# Patient Record
Sex: MALE | Race: BLACK OR AFRICAN AMERICAN | Employment: UNEMPLOYED | URBAN - METROPOLITAN AREA
[De-identification: names, ages, dates, MRNs, and addresses within clinical notes are randomized per-mention and may not be internally consistent; named-entity substitution may affect disease eponyms.]

---

## 2017-09-04 PROBLEM — G40.909 SEIZURE DISORDER (HCC): Status: ACTIVE | Noted: 2017-09-04

## 2017-09-14 ENCOUNTER — OFFICE VISIT (OUTPATIENT)
Dept: FAMILY MEDICINE CLINIC | Age: 22
End: 2017-09-14

## 2017-09-14 VITALS
OXYGEN SATURATION: 98 % | WEIGHT: 200.4 LBS | RESPIRATION RATE: 16 BRPM | HEIGHT: 68 IN | BODY MASS INDEX: 30.37 KG/M2 | HEART RATE: 81 BPM | DIASTOLIC BLOOD PRESSURE: 68 MMHG | SYSTOLIC BLOOD PRESSURE: 114 MMHG

## 2017-09-14 DIAGNOSIS — Z76.89 ENCOUNTER TO ESTABLISH CARE: ICD-10-CM

## 2017-09-14 DIAGNOSIS — Z23 NEED FOR TDAP VACCINATION: ICD-10-CM

## 2017-09-14 DIAGNOSIS — Z00.00 WELLNESS EXAMINATION: Primary | ICD-10-CM

## 2017-09-14 DIAGNOSIS — Z11.4 SCREENING FOR HIV WITHOUT PRESENCE OF RISK FACTORS: ICD-10-CM

## 2017-09-14 PROCEDURE — 99385 PREV VISIT NEW AGE 18-39: CPT | Performed by: REGISTERED NURSE

## 2017-09-14 ASSESSMENT — PATIENT HEALTH QUESTIONNAIRE - PHQ9
SUM OF ALL RESPONSES TO PHQ QUESTIONS 1-9: 0
1. LITTLE INTEREST OR PLEASURE IN DOING THINGS: 0
2. FEELING DOWN, DEPRESSED OR HOPELESS: 0
SUM OF ALL RESPONSES TO PHQ9 QUESTIONS 1 & 2: 0

## 2017-09-27 ENCOUNTER — NURSE ONLY (OUTPATIENT)
Dept: FAMILY MEDICINE CLINIC | Age: 22
End: 2017-09-27

## 2017-09-27 DIAGNOSIS — Z11.4 SCREENING FOR HIV WITHOUT PRESENCE OF RISK FACTORS: ICD-10-CM

## 2017-09-27 DIAGNOSIS — Z00.00 WELLNESS EXAMINATION: ICD-10-CM

## 2017-09-27 LAB
CHOLESTEROL, TOTAL: 205 MG/DL (ref 0–199)
HDLC SERPL-MCNC: 56 MG/DL (ref 40–60)
LDL CHOLESTEROL CALCULATED: 138 MG/DL
TRIGL SERPL-MCNC: 57 MG/DL (ref 0–150)
VLDLC SERPL CALC-MCNC: 11 MG/DL

## 2017-09-27 PROCEDURE — 36415 COLL VENOUS BLD VENIPUNCTURE: CPT | Performed by: REGISTERED NURSE

## 2017-09-28 LAB — HIV-1 AND HIV-2 ANTIBODIES: NORMAL

## 2019-10-11 ENCOUNTER — APPOINTMENT (OUTPATIENT)
Dept: GENERAL RADIOLOGY | Age: 24
End: 2019-10-11
Payer: COMMERCIAL

## 2019-10-11 ENCOUNTER — HOSPITAL ENCOUNTER (EMERGENCY)
Age: 24
Discharge: HOME | End: 2019-10-11
Payer: COMMERCIAL

## 2019-10-11 VITALS
SYSTOLIC BLOOD PRESSURE: 101 MMHG | BODY MASS INDEX: 32.53 KG/M2 | OXYGEN SATURATION: 100 % | RESPIRATION RATE: 18 BRPM | DIASTOLIC BLOOD PRESSURE: 76 MMHG | HEART RATE: 93 BPM | TEMPERATURE: 98.6 F | WEIGHT: 207.67 LBS

## 2019-10-11 DIAGNOSIS — R07.89 CHEST WALL PAIN: Primary | ICD-10-CM

## 2019-10-11 LAB
ANION GAP SERPL CALCULATED.3IONS-SCNC: 14 MMOL/L (ref 3–16)
BASOPHILS ABSOLUTE: 0 K/UL (ref 0–0.2)
BASOPHILS RELATIVE PERCENT: 0.3 %
BUN BLDV-MCNC: 11 MG/DL (ref 7–20)
CALCIUM SERPL-MCNC: 8.9 MG/DL (ref 8.3–10.6)
CHLORIDE BLD-SCNC: 100 MMOL/L (ref 99–110)
CO2: 21 MMOL/L (ref 21–32)
CREAT SERPL-MCNC: 1 MG/DL (ref 0.9–1.3)
EKG ATRIAL RATE: 78 BPM
EKG DIAGNOSIS: NORMAL
EKG P AXIS: 57 DEGREES
EKG P-R INTERVAL: 178 MS
EKG Q-T INTERVAL: 366 MS
EKG QRS DURATION: 92 MS
EKG QTC CALCULATION (BAZETT): 417 MS
EKG R AXIS: 71 DEGREES
EKG T AXIS: 0 DEGREES
EKG VENTRICULAR RATE: 78 BPM
EOSINOPHILS ABSOLUTE: 0 K/UL (ref 0–0.6)
EOSINOPHILS RELATIVE PERCENT: 0.8 %
GFR AFRICAN AMERICAN: >60
GFR NON-AFRICAN AMERICAN: >60
GLUCOSE BLD-MCNC: 101 MG/DL (ref 70–99)
HCT VFR BLD CALC: 45.3 % (ref 40.5–52.5)
HEMOGLOBIN: 15.3 G/DL (ref 13.5–17.5)
LYMPHOCYTES ABSOLUTE: 1.9 K/UL (ref 1–5.1)
LYMPHOCYTES RELATIVE PERCENT: 32.3 %
MCH RBC QN AUTO: 31.2 PG (ref 26–34)
MCHC RBC AUTO-ENTMCNC: 33.7 G/DL (ref 31–36)
MCV RBC AUTO: 92.5 FL (ref 80–100)
MONOCYTES ABSOLUTE: 0.5 K/UL (ref 0–1.3)
MONOCYTES RELATIVE PERCENT: 9.1 %
NEUTROPHILS ABSOLUTE: 3.3 K/UL (ref 1.7–7.7)
NEUTROPHILS RELATIVE PERCENT: 57.5 %
PDW BLD-RTO: 12.9 % (ref 12.4–15.4)
PLATELET # BLD: 263 K/UL (ref 135–450)
PLATELET SLIDE REVIEW: ADEQUATE
PMV BLD AUTO: 8.5 FL (ref 5–10.5)
POTASSIUM SERPL-SCNC: 3.7 MMOL/L (ref 3.5–5.1)
RBC # BLD: 4.89 M/UL (ref 4.2–5.9)
SLIDE REVIEW: NORMAL
SODIUM BLD-SCNC: 135 MMOL/L (ref 136–145)
TROPONIN: <0.01 NG/ML
WBC # BLD: 5.8 K/UL (ref 4–11)

## 2019-10-11 PROCEDURE — 93005 ELECTROCARDIOGRAM TRACING: CPT | Performed by: EMERGENCY MEDICINE

## 2019-10-11 PROCEDURE — 6360000002 HC RX W HCPCS: Performed by: PHYSICIAN ASSISTANT

## 2019-10-11 PROCEDURE — 93010 ELECTROCARDIOGRAM REPORT: CPT | Performed by: INTERNAL MEDICINE

## 2019-10-11 PROCEDURE — 80048 BASIC METABOLIC PNL TOTAL CA: CPT

## 2019-10-11 PROCEDURE — 85025 COMPLETE CBC W/AUTO DIFF WBC: CPT

## 2019-10-11 PROCEDURE — 71046 X-RAY EXAM CHEST 2 VIEWS: CPT

## 2019-10-11 PROCEDURE — 84484 ASSAY OF TROPONIN QUANT: CPT

## 2019-10-11 PROCEDURE — 84132 ASSAY OF SERUM POTASSIUM: CPT

## 2019-10-11 PROCEDURE — 99284 EMERGENCY DEPT VISIT MOD MDM: CPT

## 2019-10-11 PROCEDURE — 96374 THER/PROPH/DIAG INJ IV PUSH: CPT

## 2019-10-11 RX ORDER — NAPROXEN 500 MG/1
500 TABLET ORAL 2 TIMES DAILY PRN
Qty: 20 TABLET | Refills: 0 | Status: SHIPPED | OUTPATIENT
Start: 2019-10-11 | End: 2020-05-08 | Stop reason: ALTCHOICE

## 2019-10-11 RX ORDER — KETOROLAC TROMETHAMINE 30 MG/ML
15 INJECTION, SOLUTION INTRAMUSCULAR; INTRAVENOUS ONCE
Status: COMPLETED | OUTPATIENT
Start: 2019-10-11 | End: 2019-10-11

## 2019-10-11 RX ADMIN — KETOROLAC TROMETHAMINE 15 MG: 30 INJECTION, SOLUTION INTRAMUSCULAR at 11:52

## 2019-10-11 ASSESSMENT — PAIN DESCRIPTION - LOCATION: LOCATION: CHEST

## 2019-10-11 ASSESSMENT — ENCOUNTER SYMPTOMS
SHORTNESS OF BREATH: 0
COUGH: 0
NAUSEA: 0
BACK PAIN: 0
DIARRHEA: 0
ABDOMINAL PAIN: 0
VOMITING: 0
EYE PAIN: 0

## 2019-10-11 ASSESSMENT — PAIN SCALES - GENERAL
PAINLEVEL_OUTOF10: 4
PAINLEVEL_OUTOF10: 4

## 2019-10-11 ASSESSMENT — PAIN DESCRIPTION - DESCRIPTORS: DESCRIPTORS: PRESSURE

## 2019-10-11 ASSESSMENT — PAIN DESCRIPTION - FREQUENCY: FREQUENCY: INTERMITTENT

## 2019-10-11 ASSESSMENT — PAIN DESCRIPTION - PAIN TYPE: TYPE: ACUTE PAIN

## 2019-10-15 ENCOUNTER — OFFICE VISIT (OUTPATIENT)
Dept: FAMILY MEDICINE CLINIC | Age: 24
End: 2019-10-15
Payer: COMMERCIAL

## 2019-10-15 VITALS
DIASTOLIC BLOOD PRESSURE: 86 MMHG | HEIGHT: 67 IN | HEART RATE: 75 BPM | SYSTOLIC BLOOD PRESSURE: 120 MMHG | OXYGEN SATURATION: 98 % | TEMPERATURE: 98.1 F | WEIGHT: 206.8 LBS | RESPIRATION RATE: 15 BRPM | BODY MASS INDEX: 32.46 KG/M2

## 2019-10-15 DIAGNOSIS — Z09 HOSPITAL DISCHARGE FOLLOW-UP: Primary | ICD-10-CM

## 2019-10-15 DIAGNOSIS — R07.89 CHEST WALL PAIN: ICD-10-CM

## 2019-10-15 PROCEDURE — G8417 CALC BMI ABV UP PARAM F/U: HCPCS | Performed by: REGISTERED NURSE

## 2019-10-15 PROCEDURE — G8427 DOCREV CUR MEDS BY ELIG CLIN: HCPCS | Performed by: REGISTERED NURSE

## 2019-10-15 PROCEDURE — 1036F TOBACCO NON-USER: CPT | Performed by: REGISTERED NURSE

## 2019-10-15 PROCEDURE — 99213 OFFICE O/P EST LOW 20 MIN: CPT | Performed by: REGISTERED NURSE

## 2019-10-15 PROCEDURE — G8484 FLU IMMUNIZE NO ADMIN: HCPCS | Performed by: REGISTERED NURSE

## 2019-10-15 RX ORDER — PREDNISONE 10 MG/1
TABLET ORAL
Qty: 42 TABLET | Refills: 0 | Status: SHIPPED | OUTPATIENT
Start: 2019-10-15 | End: 2019-10-25

## 2019-10-15 ASSESSMENT — PATIENT HEALTH QUESTIONNAIRE - PHQ9
SUM OF ALL RESPONSES TO PHQ QUESTIONS 1-9: 0
2. FEELING DOWN, DEPRESSED OR HOPELESS: 0
SUM OF ALL RESPONSES TO PHQ9 QUESTIONS 1 & 2: 0
1. LITTLE INTEREST OR PLEASURE IN DOING THINGS: 0
SUM OF ALL RESPONSES TO PHQ QUESTIONS 1-9: 0

## 2019-10-15 ASSESSMENT — ENCOUNTER SYMPTOMS
WHEEZING: 0
SHORTNESS OF BREATH: 0

## 2019-11-17 ASSESSMENT — ENCOUNTER SYMPTOMS
CHEST TIGHTNESS: 0
COUGH: 0
APNEA: 0
STRIDOR: 0
CHOKING: 0

## 2020-04-07 ENCOUNTER — APPOINTMENT (OUTPATIENT)
Dept: GENERAL RADIOLOGY | Age: 25
End: 2020-04-07
Payer: COMMERCIAL

## 2020-04-07 ENCOUNTER — HOSPITAL ENCOUNTER (EMERGENCY)
Age: 25
Discharge: HOME | End: 2020-04-07
Payer: COMMERCIAL

## 2020-04-07 ENCOUNTER — APPOINTMENT (OUTPATIENT)
Dept: CT IMAGING | Age: 25
End: 2020-04-07
Payer: COMMERCIAL

## 2020-04-07 VITALS
WEIGHT: 206.13 LBS | RESPIRATION RATE: 16 BRPM | BODY MASS INDEX: 32.35 KG/M2 | SYSTOLIC BLOOD PRESSURE: 118 MMHG | TEMPERATURE: 98.1 F | OXYGEN SATURATION: 99 % | HEART RATE: 70 BPM | HEIGHT: 67 IN | DIASTOLIC BLOOD PRESSURE: 78 MMHG

## 2020-04-07 LAB
AMPHETAMINE SCREEN, URINE: NORMAL
ANION GAP SERPL CALCULATED.3IONS-SCNC: 14 MMOL/L (ref 3–16)
BARBITURATE SCREEN URINE: NORMAL
BASOPHILS ABSOLUTE: 0 K/UL (ref 0–0.2)
BASOPHILS RELATIVE PERCENT: 0.3 %
BENZODIAZEPINE SCREEN, URINE: NORMAL
BILIRUBIN URINE: NEGATIVE
BLOOD, URINE: NEGATIVE
BUN BLDV-MCNC: 8 MG/DL (ref 7–20)
CALCIUM SERPL-MCNC: 9.4 MG/DL (ref 8.3–10.6)
CANNABINOID SCREEN URINE: NORMAL
CHLORIDE BLD-SCNC: 102 MMOL/L (ref 99–110)
CLARITY: CLEAR
CO2: 25 MMOL/L (ref 21–32)
COCAINE METABOLITE SCREEN URINE: NORMAL
COLOR: YELLOW
CREAT SERPL-MCNC: 1 MG/DL (ref 0.9–1.3)
EOSINOPHILS ABSOLUTE: 0 K/UL (ref 0–0.6)
EOSINOPHILS RELATIVE PERCENT: 0.2 %
GFR AFRICAN AMERICAN: >60
GFR NON-AFRICAN AMERICAN: >60
GLUCOSE BLD-MCNC: 103 MG/DL (ref 70–99)
GLUCOSE URINE: NEGATIVE MG/DL
HCT VFR BLD CALC: 46.4 % (ref 40.5–52.5)
HEMOGLOBIN: 15.7 G/DL (ref 13.5–17.5)
KEPPRA DOSE AMT: NORMAL
KEPPRA: 7.9 UG/ML (ref 6–46)
KETONES, URINE: NEGATIVE MG/DL
LEUKOCYTE ESTERASE, URINE: NEGATIVE
LYMPHOCYTES ABSOLUTE: 1.4 K/UL (ref 1–5.1)
LYMPHOCYTES RELATIVE PERCENT: 19.6 %
Lab: NORMAL
MCH RBC QN AUTO: 30.9 PG (ref 26–34)
MCHC RBC AUTO-ENTMCNC: 33.8 G/DL (ref 31–36)
MCV RBC AUTO: 91.3 FL (ref 80–100)
METHADONE SCREEN, URINE: NORMAL
MICROSCOPIC EXAMINATION: NORMAL
MONOCYTES ABSOLUTE: 0.6 K/UL (ref 0–1.3)
MONOCYTES RELATIVE PERCENT: 8 %
NEUTROPHILS ABSOLUTE: 5.1 K/UL (ref 1.7–7.7)
NEUTROPHILS RELATIVE PERCENT: 71.9 %
NITRITE, URINE: NEGATIVE
OPIATE SCREEN URINE: NORMAL
OXYCODONE URINE: NORMAL
PDW BLD-RTO: 12.8 % (ref 12.4–15.4)
PH UA: 7
PH UA: 7 (ref 5–8)
PHENCYCLIDINE SCREEN URINE: NORMAL
PLATELET # BLD: 314 K/UL (ref 135–450)
PMV BLD AUTO: 7.5 FL (ref 5–10.5)
POTASSIUM REFLEX MAGNESIUM: 3.8 MMOL/L (ref 3.5–5.1)
PROPOXYPHENE SCREEN: NORMAL
PROTEIN UA: NEGATIVE MG/DL
RBC # BLD: 5.09 M/UL (ref 4.2–5.9)
SODIUM BLD-SCNC: 141 MMOL/L (ref 136–145)
SPECIFIC GRAVITY UA: 1.02 (ref 1–1.03)
URINE REFLEX TO CULTURE: NORMAL
URINE TYPE: NORMAL
UROBILINOGEN, URINE: 1 E.U./DL
WBC # BLD: 7 K/UL (ref 4–11)

## 2020-04-07 PROCEDURE — 71045 X-RAY EXAM CHEST 1 VIEW: CPT

## 2020-04-07 PROCEDURE — 80177 DRUG SCRN QUAN LEVETIRACETAM: CPT

## 2020-04-07 PROCEDURE — 80307 DRUG TEST PRSMV CHEM ANLYZR: CPT

## 2020-04-07 PROCEDURE — 85025 COMPLETE CBC W/AUTO DIFF WBC: CPT

## 2020-04-07 PROCEDURE — 80175 DRUG SCREEN QUAN LAMOTRIGINE: CPT

## 2020-04-07 PROCEDURE — 81003 URINALYSIS AUTO W/O SCOPE: CPT

## 2020-04-07 PROCEDURE — 80048 BASIC METABOLIC PNL TOTAL CA: CPT

## 2020-04-07 PROCEDURE — 99284 EMERGENCY DEPT VISIT MOD MDM: CPT

## 2020-04-07 PROCEDURE — 70450 CT HEAD/BRAIN W/O DYE: CPT

## 2020-04-07 ASSESSMENT — PAIN SCALES - GENERAL: PAINLEVEL_OUTOF10: 0

## 2020-04-07 NOTE — ED PROVIDER NOTES
lower leg: No edema. Skin:     General: Skin is warm and dry. Coloration: Skin is not pale. Neurological:      Mental Status: He is lethargic. GCS: GCS eye subscore is 4. GCS verbal subscore is 4. GCS motor subscore is 6. Cranial Nerves: Cranial nerves are intact. Comments: No gross facial drooping. Moves all 4 extremities spontaneously. Psychiatric:         Behavior: Behavior normal.         DIAGNOSTIC RESULTS   LABS:    Labs Reviewed   BASIC METABOLIC PANEL W/ REFLEX TO MG FOR LOW K - Abnormal; Notable for the following components:       Result Value    Glucose 103 (*)     All other components within normal limits    Narrative:     Performed at:  [facility]  [address] [address]   Phone [phone]   CBC WITH AUTO DIFFERENTIAL    Narrative:     Performed at:  [facility]  [address]   Phone [phone]   URINE RT REFLEX TO CULTURE    Narrative:     Performed at:  [facility]  [address] [place] [address]   Phone [phone]   LEVETIRACETAM LEVEL    Narrative:     Performed at:  [address] [place] [address] Laboratory  [address] [place], [place] [address]   Phone [phone]   URINE DRUG SCREEN    Narrative:     Performed at:  [facility]  [address] [address]   Phone [phone]   LAMOTRIGINE LEVEL       All other labs were within normal range or not returned as of this dictation. EKG: All EKG's are interpreted by the Emergency Department Physician in the absence of a cardiologist.  Please see their note for interpretation of EKG.       RADIOLOGY:   Non-plain film images such as CT, Ultrasound and MRI are read by the radiologist. Plain radiographic images are visualized and preliminarily interpreted by the ED Provider with the below speak with mother    Mother on the phone states that patient's slowed behavior and verbal response is consistent with his baseline. She is requesting referral to a different neurologist because his neurologist Dr. Boo Castro will not take his insurance care source. I advised that she call Dr. Boo Castro office for follow-up however we will also provide with clinic phone number to [facility] neurology if he is unable to follow-up with Dr. Boo Castro. At this time I believe patient's presentation does not warrant further workup with labs or imaging in the emergency department and is stable for discharge home. I discussed with Prasanna Oliva and/or family the exam results, diagnosis, care, prognosis, reasons to return to the emergency department, and the importance of follow up with primary care provider in 24-48 hours. They verbalized understanding and were discharged in stable condition. Prasanna Oliva is well appearing, non-toxic, and afebrile at the time of discharge. FINAL IMPRESSION      1. Breakthrough seizure ([address]    2.  Facial contusion, initial encounter          DISPOSITION/PLAN   DISPOSITION        PATIENT REFERREDTO:  [address] [employer] [id number]      ED follow up with your neurologist       Neurology, if needed  Clinic Phone: [phone]    ED follow up as needed if unable to go to [facility] due to insurance      DISCHARGE MEDICATIONS:  New Prescriptions    No medications on file       DISCONTINUED MEDICATIONS:  Discontinued Medications    No medications on file              (Please note that portions of this note were completed with a voice recognition program.  Efforts were made to edit the dictations but occasionally words are mis-transcribed.)    Benjy Garcia (electronically signed)            MIKE Garcia  04/07/20 4600

## 2020-04-07 NOTE — ED TRIAGE NOTES
Patient to ED via squad for witnessed seizure at work. Patient fell forward and hit face and abrasions to right and left knuckes. hx seizure. reports taking meds accurately. Patient is not speaking, only shaking his head yes and no, appropriately. Patient denies pain.

## 2020-04-09 LAB — LAMOTRIGINE LEVEL: 7.4 UG/ML (ref 2.5–15)

## 2020-04-24 ENCOUNTER — HOSPITAL ENCOUNTER (EMERGENCY)
Age: 25
Discharge: HOME | End: 2020-04-24
Payer: COMMERCIAL

## 2020-04-24 VITALS
BODY MASS INDEX: 32.35 KG/M2 | RESPIRATION RATE: 18 BRPM | TEMPERATURE: 98.7 F | DIASTOLIC BLOOD PRESSURE: 74 MMHG | SYSTOLIC BLOOD PRESSURE: 136 MMHG | OXYGEN SATURATION: 98 % | WEIGHT: 206.13 LBS | HEIGHT: 67 IN | HEART RATE: 82 BPM

## 2020-04-24 LAB
A/G RATIO: 1.5 (ref 1.1–2.2)
ALBUMIN SERPL-MCNC: 4.8 G/DL (ref 3.4–5)
ALP BLD-CCNC: 76 U/L (ref 40–129)
ALT SERPL-CCNC: <5 U/L (ref 10–40)
ANION GAP SERPL CALCULATED.3IONS-SCNC: 12 MMOL/L (ref 3–16)
AST SERPL-CCNC: <5 U/L (ref 15–37)
BASOPHILS ABSOLUTE: 0 K/UL (ref 0–0.2)
BASOPHILS RELATIVE PERCENT: 0.3 %
BILIRUB SERPL-MCNC: <0.2 MG/DL (ref 0–1)
BUN BLDV-MCNC: 11 MG/DL (ref 7–20)
CALCIUM SERPL-MCNC: 9.5 MG/DL (ref 8.3–10.6)
CHLORIDE BLD-SCNC: 104 MMOL/L (ref 99–110)
CO2: 25 MMOL/L (ref 21–32)
CREAT SERPL-MCNC: 0.8 MG/DL (ref 0.9–1.3)
EOSINOPHILS ABSOLUTE: 0 K/UL (ref 0–0.6)
EOSINOPHILS RELATIVE PERCENT: 0.2 %
GFR AFRICAN AMERICAN: >60
GFR NON-AFRICAN AMERICAN: >60
GLOBULIN: 3.3 G/DL
GLUCOSE BLD-MCNC: 145 MG/DL (ref 70–99)
HCT VFR BLD CALC: 47.2 % (ref 40.5–52.5)
HEMOGLOBIN: 16 G/DL (ref 13.5–17.5)
KEPPRA DOSE AMT: ABNORMAL
KEPPRA: <2 UG/ML (ref 6–46)
LYMPHOCYTES ABSOLUTE: 1.4 K/UL (ref 1–5.1)
LYMPHOCYTES RELATIVE PERCENT: 27.7 %
MCH RBC QN AUTO: 30.8 PG (ref 26–34)
MCHC RBC AUTO-ENTMCNC: 33.9 G/DL (ref 31–36)
MCV RBC AUTO: 90.9 FL (ref 80–100)
MONOCYTES ABSOLUTE: 0.6 K/UL (ref 0–1.3)
MONOCYTES RELATIVE PERCENT: 10.9 %
NEUTROPHILS ABSOLUTE: 3.1 K/UL (ref 1.7–7.7)
NEUTROPHILS RELATIVE PERCENT: 60.9 %
PDW BLD-RTO: 12.5 % (ref 12.4–15.4)
PLATELET # BLD: 279 K/UL (ref 135–450)
PMV BLD AUTO: 8 FL (ref 5–10.5)
POTASSIUM SERPL-SCNC: 4.2 MMOL/L (ref 3.5–5.1)
RBC # BLD: 5.19 M/UL (ref 4.2–5.9)
SODIUM BLD-SCNC: 141 MMOL/L (ref 136–145)
TOTAL PROTEIN: 8.1 G/DL (ref 6.4–8.2)
WBC # BLD: 5.1 K/UL (ref 4–11)

## 2020-04-24 PROCEDURE — 99284 EMERGENCY DEPT VISIT MOD MDM: CPT

## 2020-04-24 PROCEDURE — 80053 COMPREHEN METABOLIC PANEL: CPT

## 2020-04-24 PROCEDURE — 85025 COMPLETE CBC W/AUTO DIFF WBC: CPT

## 2020-04-24 PROCEDURE — 80177 DRUG SCRN QUAN LEVETIRACETAM: CPT

## 2020-04-24 PROCEDURE — 80175 DRUG SCREEN QUAN LAMOTRIGINE: CPT

## 2020-04-24 ASSESSMENT — ENCOUNTER SYMPTOMS
DIARRHEA: 0
SHORTNESS OF BREATH: 0
VOMITING: 0
BLOOD IN STOOL: 0
BACK PAIN: 0
ABDOMINAL PAIN: 0

## 2020-04-24 NOTE — ED TRIAGE NOTES
Patient alert, able to answer yes/no questions with a shake or nod of his head. Patient placed in seizure precautions, patient and room safe. Patient on monitor.

## 2020-04-24 NOTE — ED PROVIDER NOTES
[facility]        Pt Name: Luis F Guthrie  MRN: [id number]  [place] 1995  Date of evaluation: 4/24/2020  Provider: William Arango PA-C  PCP: No primary care provider on file. Evaluation by MIA. My supervising physician was available for consultation. CHIEF COMPLAINT       Chief Complaint   Patient presents with    Seizures     Pt brought in by [facility] from home. Squad reports patient appearing postictal upon their arrival . Patient takes lamictal and keppra. Patient manages his own medications lives at home with his mother. Patient broughtto hospital 2 weeks ago with aguilar BAUTISTA. Per squad patientwas supposed to follow up with neuro but have not done so as of yet. HISTORY OF PRESENT ILLNESS   (Location, Timing/Onset, Context/Setting, Quality, Duration, Modifying Factors, Severity, Associated Signs and Symptoms)  Note limiting factors. Luis F Guthrie is a 22 y.o. male that presents to the emergency department after having seizure at home. Patient is on Lamictal and Keppra. Was seen also on 4/7 for this and had Lamictal and Keppra levels performed that were within reference range but on the lower side of normal reference range. He states he has been compliant with his medication. Also had CT imaging of head and drug screen done at that time that was unremarkable. Patient states he has been compliant with his medication. At that time MIA discussed this with his mother who states that his slow responses baseline. He was referred to a new neurologist but he states he has not followed up. Patient is alert and oriented x3 and able to state who the president [facility] is. He denies any pain. Denies any issues. Nursing Notes were all reviewed and agreed with or any disagreements were addressed in the HPI.     REVIEW OF SYSTEMS    (2-9 systems for level 4, 10 or more for level 5)     Review of Systems

## 2020-04-26 LAB — LAMOTRIGINE LEVEL: 5.4 UG/ML (ref 2.5–15)

## 2020-05-08 ENCOUNTER — VIRTUAL VISIT (OUTPATIENT)
Dept: FAMILY MEDICINE CLINIC | Age: 25
End: 2020-05-08
Payer: COMMERCIAL

## 2020-05-08 VITALS — HEIGHT: 67 IN | WEIGHT: 225 LBS | BODY MASS INDEX: 35.31 KG/M2

## 2020-05-08 PROCEDURE — 1036F TOBACCO NON-USER: CPT | Performed by: FAMILY MEDICINE

## 2020-05-08 PROCEDURE — 99214 OFFICE O/P EST MOD 30 MIN: CPT | Performed by: FAMILY MEDICINE

## 2020-05-08 PROCEDURE — G8427 DOCREV CUR MEDS BY ELIG CLIN: HCPCS | Performed by: FAMILY MEDICINE

## 2020-05-08 PROCEDURE — G8417 CALC BMI ABV UP PARAM F/U: HCPCS | Performed by: FAMILY MEDICINE

## 2020-05-08 ASSESSMENT — PATIENT HEALTH QUESTIONNAIRE - PHQ9
SUM OF ALL RESPONSES TO PHQ QUESTIONS 1-9: 0
SUM OF ALL RESPONSES TO PHQ QUESTIONS 1-9: 0
2. FEELING DOWN, DEPRESSED OR HOPELESS: 0
SUM OF ALL RESPONSES TO PHQ9 QUESTIONS 1 & 2: 0
1. LITTLE INTEREST OR PLEASURE IN DOING THINGS: 0

## 2020-05-08 NOTE — PROGRESS NOTES
Subjective:      Patient ID: Jewel Lofton is a 22 y.o. male. Chief Complaint   Patient presents with    Seizures     [address]  HPI  4/7/20 ER visit  Chief Complaint   Patient presents with    Seizures       witnessed seizure at work. falling forward and hitting face. hx seizure. reports taking meds accurately. HISTORY OF PRESENT ILLNESS    Jewel Lofton is a 22 y.o. male with past medical history of seizures on Lamictal and Keppra who presents to the emergency department via EMS for reported witnessed seizure while at work at [employer]. Patient is alert, oriented to self not place or time thinks it is October 2020. Does not recall being at work this morning. Denies any pain at this time. Reports compliance with medications. HPI/ROS limited due to AMS. PHYSICAL EXAM    (up to 7 for level 4, 8 or more for level 5)                ED Triage Vitals [04/07/20 1225]   BP Temp Temp Source Pulse Resp SpO2 Height Weight   129/84 98.1 °F (36.7 °C) Oral 79 14 96 % 5' 7\" (1.702 m) 206 lb 2.1 oz (93.5 kg)         Physical Exam  Vitals signs and nursing note reviewed. Constitutional:       General: He is not in acute distress. Appearance: He is well-developed. He is not diaphoretic. HENT:      Head: Normocephalic. Contusion present. No raccoon eyes or Rodriguez's sign. Hair is normal.      Jaw: No trismus or pain on movement. Neurological:      Mental Status: He is lethargic. GCS: GCS eye subscore is 4. GCS verbal subscore is 4. GCS motor subscore is 6. Cranial Nerves: Cranial nerves are intact. Comments: No gross facial drooping.  Moves all 4 extremities spontaneously.      EMERGENCY DEPARTMENT COURSE and DIFFERENTIAL DIAGNOSIS/MDM:   Vitals:    Vitals       Vitals:     04/07/20 1225   BP: 129/84   Pulse: 79   Resp: 14   Temp: 98.1 °F (36.7 °C)   TempSrc: Oral   SpO2: 96%   Weight: 206 lb 2.1 oz (93.5 kg)   Height: 5' 7\" (1.702 m)         Patient was given the following medications:  Medications - No data to display     DDX: Electrolyte abnormality (hyponatremia, uremia, liver failure, hypoglycemia), Meningitis, Head injury, Drug overdose, Drug/alcohol withdrawal, Status Epilepticus, Breakthrough Seizure, Intracranial Bleed, Brain Tumor, Hypoglycemia, neck fracture or other orthopedic fractures, posterior shoulder dislocation     Patient seen and examined today for break through seizure.  See HPI for patient presentation. Post ictal on arrival A&O to self and year but not month  And unable to describe what happened this morning. Patient is hemodynamically stable, in no acute distress, nontoxic, afebrile, and without tachycardia, tachypnea, and hypoxia.  Physical exam as above. CT head negative for acute process. CXR negative for PNA or other acute process. I have reviewed the patient's laboratory results. The patient has normal WBCs, hematocrit and platelets. They have no severe electrolyte abnormality or renal impairment. Urinalysis shows no sign of infection. Lamotrigine is a send out lab that will need followed up on by his neurologist. Shelba Savory level is within therapeutic level.     Clinical impression is breakthrough seizure. At reevaluation pt is resting in NAD. Received permission to call and speak with mother     Mother on the phone states that patient's slowed behavior and verbal response is consistent with his baseline. She is requesting referral to a different neurologist because his neurologist Dr. Laurie Ashton will not take his insurance care source. I advised that she call Dr. Laurie Ashton office for follow-up however we will also provide with clinic phone number to [facility] neurology if he is unable to follow-up with Dr. Laurie Ashton.     At this time I believe patient's presentation does not warrant further workup with labs or imaging in the emergency department and is stable for discharge home.  I discussed with Myla Latoya and/or family the exam results, diagnosis, care, occurred. When occurred at home. Was playing a video game and got something out of fridge in his room. He fell. Then he had no recall of where he was. Then he came to in the ER. Then went home in 2 hrs. Then had to have a note from the Dr about his health history. Has not been seeing neurology b/c his neurologist Dr. Oralia Eckert is not taking his insurance. He will see a new one on 6/4/20. (no family hx of sz.)  He has 3 bottles of Lamictal.  Keppra he has none. Will be home all day. Current Outpatient Medications   Medication Sig Dispense Refill    lamoTRIgine (LAMICTAL) 25 MG tablet Take 5 tablets by mouth 2 times daily 30 tablet 0    levETIRAcetam (KEPPRA) 500 MG tablet Take 1 tablet by mouth 2 times daily 60 tablet 0     No current facility-administered medications for this visit. Review of Systems    Objective:   Physical Exam   Vitals:    05/08/20 1421   Weight: 225 lb (102.1 kg)   Height: 5' 7\" (1.702 m)     BP Readings from Last 3 Encounters:   04/24/20 136/74   04/07/20 118/78   10/15/19 120/86     Pulse Readings from Last 3 Encounters:   04/24/20 82   04/07/20 70   10/15/19 75     Wt Readings from Last 3 Encounters:   05/08/20 225 lb (102.1 kg)   04/24/20 206 lb 2.1 oz (93.5 kg)   04/07/20 206 lb 2.1 oz (93.5 kg)     Body mass index is 35.24 kg/m². He is at home [address] [place] [address]. Date is 5/8/2020. Pres Andi Romo.  - knows who he is but not his name.       4/7/2020 13:13 4/24/2020 15:59   Sodium 141 141   Potassium 3.8 4.2   Chloride 102 104   CO2 25 25   BUN 8 11   Creatinine 1.0 0.8 (L)   Anion Gap 14 12   GFR African American >60 >60   Glucose 103 (H) 145 (H)   Calcium 9.4 9.5      4/24/2020 15:59   Albumin 4.8   Globulin 3.3   Albumin/Globulin Ratio 1.5   Alk Phos 76   ALT <5 (L)   AST <5 (A)   Bilirubin <0.2   Total Protein 8.1      4/7/2020 13:45   Amphetamine Screen, Urine Neg   Barbiturate Screen, Ur Neg   Benzodiazepine Screen, Urine Neg In the future call us 1 to 2 weeks before you run out of medicine. We can refill the medication temporarily. If you are not up-to-date with your visits with us we may ask you to schedule a special visit to for a full 3-month refill for your seizure medication. Also if you have had a breakthrough seizure and you are unable to see neurology we will ask you to follow-up with us. Once you see neurology you will only need to see us once a year for other problems. If you are gaining weight your seizure medicine may need to be adjusted. Educated about COVID-19 virus infection    Preventing the Spread of Coronavirus Disease 2019 in Homes and Residential Communities   For the most recent information go to [employer]    Prevention steps for People with confirmed or suspected COVID-19 (including persons under investigation) who do not need to be hospitalized  and   People with confirmed COVID-19 who were hospitalized and determined to be medically stable to go home    Your healthcare provider and public health staff will evaluate whether you can be cared for at home. If it is determined that you do not need to be hospitalized and can be isolated at home, you will be monitored by staff from your local or state health department. You should follow the prevention steps below until a healthcare provider or local or state health department says you can return to your normal activities. Stay home except to get medical care  People who are mildly ill with COVID-19 are able to isolate at home during their illness. You should restrict activities outside your home, except for getting medical care. Do not go to work, school, or public areas. Avoid using public transportation, ride-sharing, or taxis. Separate yourself from other people and animals in your home  People: As much as possible, you should stay in a specific room and away from other people in your home. Also, you should use a separate bathroom, if available. Animals: You should restrict contact with pets and other animals while you are sick with COVID-19, just like you would around other people. Although there have not been reports of pets or other animals becoming sick with COVID-19, it is still recommended that people sick with COVID-19 limit contact with animals until more information is known about the virus. When possible, have another member of your household care for your animals while you are sick. If you are sick with COVID-19, avoid contact with your pet, including petting, snuggling, being kissed or licked, and sharing food. If you must care for your pet or be around animals while you are sick, wash your hands before and after you interact with pets and wear a facemask. Call ahead before visiting your doctor  If you have a medical appointment, call the healthcare provider and tell them that you have or may have COVID-19. This will help the healthcare providers office take steps to keep other people from getting infected or exposed. Wear a facemask  You should wear a facemask when you are around other people (e.g., sharing a room or vehicle) or pets and before you enter a healthcare providers office. If you are not able to wear a facemask (for example, because it causes trouble breathing), then people who live with you should not stay in the same room with you, or they should wear a facemask if they enter your room. Cover your coughs and sneezes  Cover your mouth and nose with a tissue when you cough or sneeze. Throw used tissues in a lined trash can. Immediately wash your hands with soap and water for at least 20 seconds or, if soap and water are not available, clean your hands with an alcohol-based hand  that contains at least 60% alcohol.   Clean your hands often  Wash your hands often with soap and water for at least 20 seconds, especially after blowing your nose, coughing, or sneezing; develop ANY respiratory infection symptoms (not just allergy symptoms) suggestive of COVID-19 start the recommendations below: Instructions for Respiratory Infections (SAVE THIS SHEET)    For the first 7-14 days of symptoms follow instructions below, even before being seen in the office or even during treatment with antibiotics, until symptom free. 1. Water: Drink 1 ounce of water for every 2 pounds of body weight for adults, you need 100 ounces of water/fluids per day. This will loosen mucus in the head and chest & improve the weak feeling of dehydration, allow the body to get germ fighting resources to the infection. Half of fluids can be juice or sugar free Crystal Light. Don't count drinks with caffeine, alcohol or carbonation. Infants can have Pedialyte liquid or freezer pops. Avoid salt and sports drinks if you have high Blood Pressure, swelling in the feet or ankles or have heart problems. 2. Humidity: Humidify the air to 35-50% ( or until the windows fog over slightly). Can use a humidifier, vaporizer, boil water on the stove or put a coffee can full of water on the heater vents. This will loosen mucus from infections and allergies. 3. Sleep: Get 8-10 hours a night and rest during the evening after work or school. If you have trouble sleeping, adults can take Melatonin 5mg up to 2 tabs at bedtime ( not for children or pregnant women). If Mono is suspected then sleep during 9PM to 9AM time span (if possible.)  4. Cough: Take cough medicines with Guaifenesin ( to loosen chest or head congestion) and Dextromethorphan ( to decrease excess cough). Robitussin D.M. Syrup every 4-6 hrs OR Mucinex D. M. pills OR Delsym DM syrup twice a day. Use the pediatric formulations for children over 6 months making sure they are alcohol & sugar free for children, pregnant women, and diabetics. 5. Pain And Fevers: Take Acetaminophen ( Tylenol) for fevers, aches, and headaches.  2-500

## 2020-05-12 ENCOUNTER — TELEPHONE (OUTPATIENT)
Dept: FAMILY MEDICINE CLINIC | Age: 25
End: 2020-05-12

## 2020-05-12 RX ORDER — LEVETIRACETAM 500 MG/1
500 TABLET ORAL 2 TIMES DAILY
Qty: 60 TABLET | Refills: 0 | Status: SHIPPED | OUTPATIENT
Start: 2020-05-12 | End: 2020-06-16

## 2020-05-13 ENCOUNTER — TELEPHONE (OUTPATIENT)
Dept: FAMILY MEDICINE CLINIC | Age: 25
End: 2020-05-13

## 2020-05-13 NOTE — TELEPHONE ENCOUNTER
Jennifer Kenney @  [phone]    Calling to see what medication he needs to take - he ran out of his seizure - the second one -  His doctor is no longer taking his insurance and they have an appt set up with a new doctor but not til Susanne    How can they get the medication? ?

## 2020-05-19 ENCOUNTER — TELEPHONE (OUTPATIENT)
Dept: FAMILY MEDICINE CLINIC | Age: 25
End: 2020-05-19

## 2020-05-19 NOTE — TELEPHONE ENCOUNTER
CALLED AND SPOKE WITH PT. HE HAS BEEN TAKING THE MEDICATION AS PRESCRIBED FOR A WEEK AND HAS NOT HAD A SEIZURE. YOU WERE WANTING HIM TO GET HIS LEVELS CHECKED BEFORE GIVING HIM A LETTER. PLEASE PLACE LAB ORDERS AND I WILL CALL THE PT AND LET HIM KNOW.  HS

## 2020-05-20 ENCOUNTER — HOSPITAL ENCOUNTER (OUTPATIENT)
Age: 25
Discharge: HOME | End: 2020-05-20
Payer: COMMERCIAL

## 2020-05-21 ENCOUNTER — HOSPITAL ENCOUNTER (OUTPATIENT)
Age: 25
Discharge: HOME | End: 2020-05-21
Payer: COMMERCIAL

## 2020-05-21 ENCOUNTER — TELEPHONE (OUTPATIENT)
Dept: FAMILY MEDICINE CLINIC | Age: 25
End: 2020-05-21

## 2020-05-21 LAB
KEPPRA DOSE AMT: ABNORMAL
KEPPRA: 4.1 UG/ML (ref 6–46)

## 2020-05-21 PROCEDURE — 80177 DRUG SCRN QUAN LEVETIRACETAM: CPT

## 2020-05-23 PROBLEM — E66.09 CLASS 2 OBESITY DUE TO EXCESS CALORIES WITH BODY MASS INDEX (BMI) OF 35.0 TO 35.9 IN ADULT: Chronic | Status: ACTIVE | Noted: 2020-05-23

## 2020-05-23 PROBLEM — E66.812 CLASS 2 OBESITY DUE TO EXCESS CALORIES WITH BODY MASS INDEX (BMI) OF 35.0 TO 35.9 IN ADULT: Chronic | Status: ACTIVE | Noted: 2020-05-23

## 2020-05-23 PROBLEM — G40.909 SEIZURE DISORDER (HCC): Chronic | Status: ACTIVE | Noted: 2017-09-04

## 2020-05-23 NOTE — PATIENT INSTRUCTIONS
infection    Preventing the Spread of Coronavirus Disease 2019 in Homes and Residential Communities   For the most recent information go to [email or web address]    Prevention steps for People with confirmed or suspected COVID-19 (including persons under investigation) who do not need to be hospitalized  and   People with confirmed COVID-19 who were hospitalized and determined to be medically stable to go home    Your healthcare provider and public health staff will evaluate whether you can be cared for at home. If it is determined that you do not need to be hospitalized and can be isolated at home, you will be monitored by staff from your local or state health department. You should follow the prevention steps below until a healthcare provider or local or state health department says you can return to your normal activities. Stay home except to get medical care  People who are mildly ill with COVID-19 are able to isolate at home during their illness. You should restrict activities outside your home, except for getting medical care. Do not go to work, school, or public areas. Avoid using public transportation, ride-sharing, or taxis. Separate yourself from other people and animals in your home  People: As much as possible, you should stay in a specific room and away from other people in your home. Also, you should use a separate bathroom, if available. Animals: You should restrict contact with pets and other animals while you are sick with COVID-19, just like you would around other people. Although there have not been reports of pets or other animals becoming sick with COVID-19, it is still recommended that people sick with COVID-19 limit contact with animals until more information is known about the virus. When possible, have another member of your household care for your animals while you are sick.  If you are sick with COVID-19, avoid contact with your pet, the weak feeling of dehydration, allow the body to get germ fighting resources to the infection. Half of fluids can be juice or sugar free Crystal Light. Don't count drinks with caffeine, alcohol or carbonation. Infants can have Pedialyte liquid or freezer pops. Avoid salt and sports drinks if you have high Blood Pressure, swelling in the feet or ankles or have heart problems. 2. Humidity: Humidify the air to 35-50% ( or until the windows fog over slightly). Can use a humidifier, vaporizer, boil water on the stove or put a coffee can full of water on the heater vents. This will loosen mucus from infections and allergies. 3. Sleep: Get 8-10 hours a night and rest during the evening after work or school. If you have trouble sleeping, adults can take Melatonin 5mg up to 2 tabs at bedtime ( not for children or pregnant women). If Mono is suspected then sleep during 9PM to 9AM time span (if possible.)  4. Cough: Take cough medicines with Guaifenesin ( to loosen chest or head congestion) and Dextromethorphan ( to decrease excess cough). Robitussin D.M. Syrup every 4-6 hrs OR Mucinex D. M. pills OR Delsym DM syrup twice a day. Use the pediatric formulations for children over 6 months making sure they are alcohol & sugar free for children, pregnant women, and diabetics. 5. Pain And Fevers: Take Acetaminophen ( Tylenol) for fevers, aches, and headaches. 2-500 mg every 8 hours for adults. Appropriate doses at bedtime for children may help them sleep better. If pregnant take 1 -500 mg (Tylenol) every 8 hours as needed. Ibuprofen/Aleve/aspirin for pain and fevers SHOULD NOT BE USED IN THE SETTING OF POSSIBLE COVID-19 viral infection NOR if pregnant, if you have acid reflux, high blood pressure, CHF, or kidney problems. 6.Gargle: (DAY ONE OF SYMPTOMS) Gargle in the back of the throat with the head tilted back and to the sides with a strong mouthwash  ( Listerine or Scope) after meals and at bedtime at least 4 -5 times a day. This helps kill bacteria and viruses in the back of the throat and will shorten the duration and decrease the severity of your symptoms: sore throat, cough, ear popping,/ear pain, and possibly dizziness. 7. Smoking: Avoid smoking or exposure to second hand smoke. 8. Zinc: (DAY ONE OF SYMPTOMS)  Zinc lozenges such as Cold Zachary (available most stores), or Basic ([employer] brand) will help shorten the duration and lessen symptoms such as sore throat, cough, nasal congestion, runny nose, and post nasal drip. Use 1 lozenge every 2-4 hours ( after meals if stomach is sensitive). Children can use 10-15 mg or less 3-4 times a day or Zinc lollypops. In pregnancy limit to 50-60 mg a day for 7 days as prenatals have Zinc also. With diarrhea use zinc pills 50 mg 1/2 to 1 pill 2x/day. 9. Vitamins: Vitamin C 500 mg with breakfast and dinner (with suspected or diagnosed COVID-19 infection take vitamin C 1000 mg 2-3 times a day). Children and pregnant women should drink citrus juices. This speeds healing and strengthens immune system. 10. Chest Symptoms: Vicks Vapor rub to the chest at bedtime. 11. Decongestants: Avoid all decongestants and antihistamine cold preparations in children. Decongestants should always be avoided in people with high blood pressure, palpitations, heart disease and those on stimulant medications. Antihistamines may impede the body's ability to fight COVID-19.  12. Frequent hand washing and/or hand gel especially after coughing or sneezing into the hands or blowing the nose to help prevent spreading to others. Use kleenex and NOT handkerchiefs. Try all of the above starting with day 1 of symptoms. If Strep throat symptoms appear call to be seen in the office as soon as possible and don't gargle on that day. Newborns, infants, or anyone with earaches or influenza may need to be seen quickly. Adults with fevers over 103 degrees or shortness of breath should call the office immediately.     Return if you

## 2020-05-26 ENCOUNTER — TELEPHONE (OUTPATIENT)
Dept: FAMILY MEDICINE CLINIC | Age: 25
End: 2020-05-26

## 2020-05-26 NOTE — TELEPHONE ENCOUNTER
Talk with the patient. He was somewhat confused. Spoke with his mother. Explained that he has an appointment with an endocrinologist Dr. Purnima Cain that he does not need. He needs to see a neurologist Dr. Maicol Swift. Numbers given to both doctors so she could cancel with the endocrinologist and schedule with Dr. Maicol Swift. Explained that if he had any seizures in the meantime they needed to call here until he saw the neurologist.  We may have to increase his medication. Explained that his Keppra level is 4.1 and should be at least 6.0. However a low level is not problematic if it controls his seizures. Explained that every time he has a seizure it can result in some brain damage. Fax work letter to number given in chart previously.

## 2020-05-28 ENCOUNTER — TELEPHONE (OUTPATIENT)
Dept: FAMILY MEDICINE CLINIC | Age: 25
End: 2020-05-28

## 2020-05-28 NOTE — TELEPHONE ENCOUNTER
PROVIDER NAME: DR. Julian Tom  PROVIDER ADDRESS:   PROVIDER [facility], ZIP:   PROVIDER PHONE: [phone]  PROVIDER FAX  PROVIDER NPI:  PROVIDER TAX I.D.:     DX CODE:  SEIZURES    CPT CODE:      NUMBER OF VISITS:      DATE OF SERVICE:      CALLER NAME:  KORINA @  [phone]

## 2020-06-04 RX ORDER — LAMOTRIGINE 25 MG/1
TABLET ORAL
Qty: 300 TABLET | Refills: 1 | Status: SHIPPED | OUTPATIENT
Start: 2020-06-04 | End: 2020-06-29

## 2020-06-16 RX ORDER — LEVETIRACETAM 500 MG/1
TABLET ORAL
Qty: 60 TABLET | Refills: 2 | Status: SHIPPED | OUTPATIENT
Start: 2020-06-16 | End: 2020-07-09

## 2020-06-25 ENCOUNTER — TELEPHONE (OUTPATIENT)
Dept: FAMILY MEDICINE CLINIC | Age: 25
End: 2020-06-25

## 2020-06-25 NOTE — TELEPHONE ENCOUNTER
Patient needs a letter from Dr. Ramon Cruz saying it is okay for him to work. He would also like to speak with Dr. Ramon Cruz concerning his seizures. Please give him a call back.

## 2020-06-25 NOTE — TELEPHONE ENCOUNTER
He never got the work note that we wrote for him. Print work note from 5/26/2020. Fax to [facility]  [facility].

## 2020-06-29 RX ORDER — LAMOTRIGINE 25 MG/1
TABLET ORAL
Qty: 300 TABLET | Refills: 1 | Status: SHIPPED | OUTPATIENT
Start: 2020-06-29 | End: 2020-07-27

## 2020-07-09 ENCOUNTER — TELEPHONE (OUTPATIENT)
Dept: FAMILY MEDICINE CLINIC | Age: 25
End: 2020-07-09

## 2020-07-09 RX ORDER — LEVETIRACETAM 500 MG/1
TABLET ORAL
Qty: 60 TABLET | Refills: 1 | Status: SHIPPED | OUTPATIENT
Start: 2020-07-09 | End: 2020-08-07

## 2020-07-27 RX ORDER — LAMOTRIGINE 25 MG/1
TABLET ORAL
Qty: 300 TABLET | Refills: 1 | Status: SHIPPED | OUTPATIENT
Start: 2020-07-27 | End: 2020-08-31 | Stop reason: SDUPTHER

## 2020-08-07 ENCOUNTER — HOSPITAL ENCOUNTER (EMERGENCY)
Age: 25
Discharge: HOME | End: 2020-08-07
Payer: COMMERCIAL

## 2020-08-07 ENCOUNTER — APPOINTMENT (OUTPATIENT)
Dept: GENERAL RADIOLOGY | Age: 25
End: 2020-08-07
Payer: COMMERCIAL

## 2020-08-07 VITALS
HEIGHT: 67 IN | WEIGHT: 225 LBS | RESPIRATION RATE: 18 BRPM | BODY MASS INDEX: 35.31 KG/M2 | OXYGEN SATURATION: 98 % | TEMPERATURE: 98.2 F | HEART RATE: 83 BPM | DIASTOLIC BLOOD PRESSURE: 89 MMHG | SYSTOLIC BLOOD PRESSURE: 128 MMHG

## 2020-08-07 PROCEDURE — 73610 X-RAY EXAM OF ANKLE: CPT

## 2020-08-07 PROCEDURE — 99283 EMERGENCY DEPT VISIT LOW MDM: CPT

## 2020-08-07 RX ORDER — LEVETIRACETAM 500 MG/1
TABLET ORAL
Qty: 60 TABLET | Refills: 0 | Status: SHIPPED | OUTPATIENT
Start: 2020-08-07 | End: 2020-08-31 | Stop reason: SDUPTHER

## 2020-08-07 RX ORDER — IBUPROFEN 800 MG/1
800 TABLET ORAL EVERY 8 HOURS PRN
Qty: 20 TABLET | Refills: 0 | Status: SHIPPED | OUTPATIENT
Start: 2020-08-07

## 2020-08-07 ASSESSMENT — ENCOUNTER SYMPTOMS
SHORTNESS OF BREATH: 0
NAUSEA: 0
CONSTIPATION: 0
VOMITING: 0
ABDOMINAL PAIN: 0
COUGH: 0
DIARRHEA: 0

## 2020-08-07 ASSESSMENT — PAIN DESCRIPTION - PAIN TYPE: TYPE: ACUTE PAIN

## 2020-08-07 ASSESSMENT — PAIN SCALES - GENERAL: PAINLEVEL_OUTOF10: 5

## 2020-08-07 NOTE — ED NOTES
Pt Discharged in stable condition, VSS, no signs of distress, discharge instructions and meds reviewed. Pt verbalizes understanding and states no further questions or concerns unaddressed. Pt taken to waiting room via wheelchair by Mike Rubin, being driven home by family.      Romeo Santana RN  08/07/20 7748

## 2020-08-07 NOTE — ED NOTES
Bed: 16  Expected date:   Expected time:   Means of arrival:   Comments:  Meron Canales RN  08/07/20 1617

## 2020-08-07 NOTE — ED PROVIDER NOTES
**EVALUATED BY MIA**    [address] [facility]  eMERGENCY dEPARTMENT eNCOUnter    Pt Name: Reyna Trinh  MRN: [id number]  [place] 1995  Date of evaluation: 8/7/2020  Provider: MIKE Welch    Chief Complaint:    Chief Complaint   Patient presents with    Ankle Pain     Pt to Er with c/o left ankle pain after tripping over hole in ground and twisting ankle. denies LOc or hitting head. swollen. Nursing Notes, Past Medical Hx, Past Surgical Hx, Social Hx, Allergies, and Family Hx were all reviewed and agreedwith or any disagreements were addressed in the HPI.    HPI:  (Location, Duration, Timing, Severity, Quality, Assoc Sx, Context, Modifying factors)  This is a  22 y.o. male who presents to the emergency department with complaints of left medial ankle pain after injury after tripping over a hole in the ground and twisting his ankle, this occurred earlier today. Denies any head injury loss consciousness. Complains of pain to the  medial aspect of the left ankle. Denies any numbness, tingling or focal weakness. Denies any previous injury to this ankle. Pain is 5 out of 10 in severity does not radiate. Ambulating normally and wearing his shoes normally without significant swelling. All other systems were reviewed and are negative. 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 Seizures began in infancy. Was on Depakote as a sole agent. Then on Lamictal.  Then on Lamictal and Keppra. Seizures are usually just staring episodes where he is not in contact with his surroundings and has some head movements.     Developmental delay 1995    Due to difficult birth. 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 Respiratory: Negative for cough and shortness of breath. Cardiovascular: Negative for chest pain. Gastrointestinal: Negative for abdominal pain, constipation, diarrhea, nausea and vomiting. Musculoskeletal: Positive for arthralgias, joint swelling and myalgias. Neurological: Negative for weakness and numbness. All other systems reviewed and are negative. Positives and Pertinent negatives as per HPI. Except as noted above in the ROS, all other systems were reviewed/completed and are negative. Physical Exam:  Physical Exam  Vitals signs and nursing note reviewed. Constitutional:       Appearance: Normal appearance. He is well-developed. He is not toxic-appearing or diaphoretic. HENT:      Head: Normocephalic. Right Ear: External ear normal.      Left Ear: External ear normal.      Nose: Nose normal.   Eyes:      General:         Right eye: No discharge. Left eye: No discharge. Conjunctiva/sclera: Conjunctivae normal.   Neck:      Musculoskeletal: Normal range of motion and neck supple. Cardiovascular:      Rate and Rhythm: Normal rate and regular rhythm. Heart sounds: Normal heart sounds. No murmur. No friction rub. No gallop. Comments: 2+ DP and PT pulses left lower extremity. Pulmonary:      Effort: Pulmonary effort is normal. No respiratory distress. Breath sounds: Normal breath sounds. No wheezing or rales. Musculoskeletal: Normal range of motion. Comments: Tenderness to medial aspect of left ankle. Skin:     General: Skin is warm and dry. Coloration: Skin is not pale. Neurological:      Mental Status: He is alert and oriented to person, place, and time. Comments: Normal distal sensation to light touch of left lower extremity. Psychiatric:         Behavior: Behavior normal. Behavior is cooperative. MEDICAL DECISION MAKING    Vitals:    Vitals:    08/07/20 1214   BP: 128/89   Pulse: 83   Resp: 18   Temp: 98.2 °F (36.8 °C)   TempSrc: Oral   SpO2: 98%   Weight: 225 lb (102.1 kg)   Height: 5' 7\" (1.702 m)       LABS: Labs Reviewed - No data to display     Remainder of labs reviewed and were negative at this time or not returned at the time of this note. RADIOLOGY:   Non-plain film images suchas CT, Ultrasound and MRI are read by the radiologist. Dorene PURDY PA have directly visualized the radiologic plain film image(s) with the below findings:  Interpretation per the Radiologist below, if available at the time of this note:    XR ANKLE LEFT (MIN 3 VIEWS)   Preliminary Result   Mild soft tissue swelling of the left ankle, without acute osseous   abnormality. Xr Ankle Left (min 3 Views)    Result Date: 8/7/2020  EXAMINATION: THREE XRAY VIEWS OF THE LEFT ANKLE 8/7/2020 1:12 pm COMPARISON: None. HISTORY: ORDERING SYSTEM PROVIDED HISTORY: fall, ankle injury TECHNOLOGIST PROVIDED HISTORY: Reason for exam:->fall, ankle injury Reason for Exam: Ankle Pain (Pt to Er with c/o left ankle pain after tripping over hole in ground and twisting ankle. denies LOc or hitting head. swollen.) Acuity: Unknown Type of Exam: Unknown FINDINGS: Three views of the left ankle were performed. There is no acute fracture or dislocation. The ankle mortise is aligned. The joint spaces of the hindfoot are preserved. Talar beaking is noted. There is mild diffuse soft tissue swelling. Mild soft tissue swelling of the left ankle, without acute osseous abnormality.         MEDICAL DECISION MAKING / ED COURSE:      PROCEDURES:   Procedures    Patient was given:  Medications - No data to display  This is a  22 y.o. male who presents to the emergency department with complaints of left medial ankle pain after injury after tripping over a hole in the ground and twisting his ankle, this occurred earlier today. Denies any head injury loss consciousness. Complains of pain to the  medial aspect of the left ankle. Denies any numbness, tingling or focal weakness. Denies any previous injury to this ankle. Pain is 5 out of 10 in severity does not radiate. Ambulating normally and wearing his shoes normally without significant swelling. Exam mild swelling to the medial aspect. Imaging showed no evidence of fracture or dislocation. There is mild soft tissue swelling to the left ankle. Aircast and crutches provided. Referral to orthopedics. Anti-inflammatories and ice. Ankle sprain instructions for home. The patient tolerated their visit well. I have evaluated the patient with physician available for consultation as needed. I have discussed the findings of today's workup with the patient and addressed the patient's questions and concerns. Important warning signs as well as new or worsening symptoms which wouldnecessitate immediate return to the ED were discussed. The plan is to discharge from the ED at this time, and the patient is in stable condition. The patient acknowledged understanding is agreeable with this plan. CLINICAL IMPRESSION:  1.  Sprain of left ankle, unspecified ligament, initial encounter        DISPOSITION Decision To Discharge 08/07/2020 02:27:14 PM      PATIENT REFERRED TO:  Bhavesh Holly MD  [address] [facility] [address] [place] [address]  [phone]    Schedule an appointment as soon as possible for a visit       [facility] Emergency Department  [address] [place] [address]  [phone]  Go to   If symptoms worsen      DISCHARGE MEDICATIONS:  New Prescriptions IBUPROFEN (ADVIL;MOTRIN) 800 MG TABLET    Take 1 tablet by mouth every 8 hours as needed for Pain              (Please note the MDM and HPI sections of this note were completed with avoice recognition program.  Efforts were made to edit the dictations but occasionally words are mis-transcribed.)    Electronically signed, MIKE Gonzalez,             MIKE Mejia  08/07/20 3715

## 2020-08-24 RX ORDER — LAMOTRIGINE 25 MG/1
TABLET ORAL
Qty: 300 TABLET | Refills: 1 | OUTPATIENT
Start: 2020-08-24

## 2020-08-31 ENCOUNTER — OFFICE VISIT (OUTPATIENT)
Dept: NEUROLOGY | Age: 25
End: 2020-08-31
Payer: COMMERCIAL

## 2020-08-31 VITALS
WEIGHT: 211 LBS | HEIGHT: 67 IN | DIASTOLIC BLOOD PRESSURE: 80 MMHG | BODY MASS INDEX: 33.12 KG/M2 | SYSTOLIC BLOOD PRESSURE: 119 MMHG | HEART RATE: 92 BPM

## 2020-08-31 PROCEDURE — 99244 OFF/OP CNSLTJ NEW/EST MOD 40: CPT | Performed by: PSYCHIATRY & NEUROLOGY

## 2020-08-31 PROCEDURE — G8427 DOCREV CUR MEDS BY ELIG CLIN: HCPCS | Performed by: PSYCHIATRY & NEUROLOGY

## 2020-08-31 PROCEDURE — G8417 CALC BMI ABV UP PARAM F/U: HCPCS | Performed by: PSYCHIATRY & NEUROLOGY

## 2020-08-31 RX ORDER — LEVETIRACETAM 500 MG/1
TABLET ORAL
Qty: 60 TABLET | Refills: 5 | Status: SHIPPED | OUTPATIENT
Start: 2020-08-31 | End: 2021-02-23 | Stop reason: SDUPTHER

## 2020-08-31 RX ORDER — LAMOTRIGINE 25 MG/1
TABLET ORAL
Qty: 300 TABLET | Refills: 5 | Status: SHIPPED | OUTPATIENT
Start: 2020-08-31 | End: 2021-02-23 | Stop reason: SDUPTHER

## 2020-08-31 NOTE — PROGRESS NOTES
 Occupation: [employer]     Employer: [employer]     Comment: alejandro garcía   Social Needs    Financial resource strain: None    Food insecurity     Worry: None     Inability: None    Transportation needs     Medical: None     Non-medical: None   Tobacco Use    Smoking status: Never Smoker    Smokeless tobacco: Never Used   Substance and Sexual Activity    Alcohol use: No     Comment: never drinks alcohol    Drug use: No    Sexual activity: Not Currently     Partners: Female   Lifestyle    Physical activity     Days per week: None     Minutes per session: None    Stress: None   Relationships    Social connections     Talks on phone: None     Gets together: None     Attends Jehovah's witness service: None     Active member of club or organization: None     Attends meetings of clubs or organizations: None     Relationship status: None    Intimate partner violence     Fear of current or ex partner: None     Emotionally abused: None     Physically abused: None     Forced sexual activity: None   Other Topics Concern    None   Social History Narrative    None       PHYSICAL EXAMINATION:  /80   Pulse 92   Ht 5' 7\" (1.702 m)   Wt 211 lb (95.7 kg)   BMI 33.05 kg/m²   Appearance: Well appearing, well nourished and in no distress  Mental Status Exam: Patient is alert, oriented x2. Recent and remote memory is fair  Fund of Knowledge is limited  Attention/concentration is normal.   Speech : No dysarthria  Language : Some word finding difficulties  Funduscopic Exam: sharp disc margins  Cranial Nerves:   II: Visual fields:  Full to confrontation  III: Pupils:  equal, round, reactive to light  III,IV,VI: Extra Ocular Movements are intact.  No nystagmus  V: Facial sensation is intact to pin prick and light touch  VII: Facial strength and movements: intact and symmetric smile,cheek puffing and eyebrow elevation  VIII: Hearing:  Intact to finger rub bilaterally  IX: Palate  elevation is symmetric  XI: Shoulder shrug is intact  XII: Tongue movements are normal  Motor:  Muscle tone and bulk are normal.   Strength is symmetrical 4+ /5 in all four extremities. Sensory: Intact to light touch and  pin prick in all four extremities  Coordination:  Normal  Finger to Nose and Heel to Shin bilaterally    . Reflexes:  DTR 1 and symmetric bilaterally  Plantar response: Flexor bilaterally  Gait: Gait and station is normal.  Romberg: negative  Vascular: No carotid bruit bilaterally        DATA:  LABS:  General Labs:    CBC:   Lab Results   Component Value Date    WBC 5.1 04/24/2020    RBC 5.19 04/24/2020    HGB 16.0 04/24/2020    HCT 47.2 04/24/2020    MCV 90.9 04/24/2020    MCH 30.8 04/24/2020    MCHC 33.9 04/24/2020    RDW 12.5 04/24/2020     04/24/2020    MPV 8.0 04/24/2020     BMP:    Lab Results   Component Value Date     04/24/2020    K 4.2 04/24/2020    K 3.8 04/07/2020     04/24/2020    CO2 25 04/24/2020    BUN 11 04/24/2020    LABALBU 4.8 04/24/2020    CREATININE 0.8 04/24/2020    CALCIUM 9.5 04/24/2020    GFRAA >60 04/24/2020    LABGLOM >60 04/24/2020    GLUCOSE 145 04/24/2020       IMPRESSION :  Partial complex seizures, usually well controlled as long as he takes his medication regularly  Developmental delay  EEG done at [facility] in 2011 was abnormal with bursts of spikes and polyspike in the bilateral frontal regions with diffuse spread  MRI brain done at [facility] in 2009 was normal    RECOMMENDATIONS :  Discussed with patient and his grandmother  Continue current dose of Keppra as well as lamotrigine  Refill prescriptions as needed  Patient does not drive an automobile at this time  Return in 6 months  Thank you for this consultation        Please note a portion of this chart was generated using dragon dictation software. Although every effort was made to ensure the accuracy of this automated transcription, some errors in transcription may have occurred.

## 2021-02-10 ENCOUNTER — TELEPHONE (OUTPATIENT)
Dept: FAMILY MEDICINE CLINIC | Age: 26
End: 2021-02-10

## 2021-02-10 NOTE — TELEPHONE ENCOUNTER
Call Salazar Bergman- Patient not seen since May of last year (2020). He is seeing a neurologist but has not seen a neurologist since 8/31/2020. Cannot speak as to what patient can or cannot do until I see the patient again. He should either schedule with me or someone in our office or he can schedule with Dr. Ekaterina Willams his neurologist and have the neurologist answer concerns about types of work he could do.

## 2021-02-10 NOTE — TELEPHONE ENCOUNTER
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 Khoa Alegre AND Shabana Avina AT THE SAME TIME    WHAT WOULD BE THE BEST TYPE OF EMPLOYMENT DUE TO HIS SEIZURES     YOU CAN CALL Shabana Avina @ [phone] AND HE WILL CONNECT WILIAN WITH THE BOTH OF YOU

## 2021-02-11 NOTE — TELEPHONE ENCOUNTER
I WAS UNABLE TO REACH [facility] HIS PHONE WAS RINGING BUSY X3 TIMES I SPOKE WITH [facility] AND SHE SAID MAKING AN APPOINTMENT WOULD NEED TO BE DISCUSSED WITH Lazara Rockwell. IF HE CALLS BACK CAN SCHEDULE WITH ANYONE IN THE BUILDING BUT HE HAS TO BE SEEN IN OFFICE AND IT SHOULD BE A 40 MIN APPT. Vahid Kenney

## 2021-02-17 ENCOUNTER — TELEPHONE (OUTPATIENT)
Dept: FAMILY MEDICINE CLINIC | Age: 26
End: 2021-02-17

## 2021-02-17 NOTE — TELEPHONE ENCOUNTER
Patient needs to schedule a visit with us or with neurology. We have not seen him for over 9 months. I have no idea what limits have been set  and what he is able to do we need to go over how he has been doing with the seizures since his last visit 9 months ago. Would be best if his mother came with him to the visit.

## 2021-02-17 NOTE — TELEPHONE ENCOUNTER
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 Please give him a call back.

## 2021-02-18 NOTE — TELEPHONE ENCOUNTER
THERE IS ANOTHER MESSAGE ON THIS ALREADY AND I HAVE LEFT 2 VM NOW FOR HIM TO SCHEDULE. IF PATIENT RETURNS CALL HE HAS TO SCHEDULE IN OFFICE WITH US OR NEUROLOGY. Stefanie Peerz

## 2021-02-23 ENCOUNTER — OFFICE VISIT (OUTPATIENT)
Dept: NEUROLOGY | Age: 26
End: 2021-02-23
Payer: COMMERCIAL

## 2021-02-23 VITALS
WEIGHT: 211 LBS | HEIGHT: 67 IN | DIASTOLIC BLOOD PRESSURE: 87 MMHG | BODY MASS INDEX: 33.12 KG/M2 | HEART RATE: 78 BPM | SYSTOLIC BLOOD PRESSURE: 139 MMHG

## 2021-02-23 DIAGNOSIS — G40.209 COMPLEX PARTIAL EPILEPSY WITH GENERALIZATION AND WITH NONINTRACTABLE EPILEPSY (HCC): Primary | Chronic | ICD-10-CM

## 2021-02-23 PROCEDURE — G8484 FLU IMMUNIZE NO ADMIN: HCPCS | Performed by: PSYCHIATRY & NEUROLOGY

## 2021-02-23 PROCEDURE — 1036F TOBACCO NON-USER: CPT | Performed by: PSYCHIATRY & NEUROLOGY

## 2021-02-23 PROCEDURE — 99213 OFFICE O/P EST LOW 20 MIN: CPT | Performed by: PSYCHIATRY & NEUROLOGY

## 2021-02-23 PROCEDURE — G8417 CALC BMI ABV UP PARAM F/U: HCPCS | Performed by: PSYCHIATRY & NEUROLOGY

## 2021-02-23 PROCEDURE — G8427 DOCREV CUR MEDS BY ELIG CLIN: HCPCS | Performed by: PSYCHIATRY & NEUROLOGY

## 2021-02-23 RX ORDER — LEVETIRACETAM 500 MG/1
TABLET ORAL
Qty: 60 TABLET | Refills: 5 | Status: SHIPPED | OUTPATIENT
Start: 2021-02-23 | End: 2021-08-18

## 2021-02-23 RX ORDER — LAMOTRIGINE 25 MG/1
TABLET ORAL
Qty: 300 TABLET | Refills: 5 | Status: SHIPPED | OUTPATIENT
Start: 2021-02-23 | End: 2021-08-24 | Stop reason: SDUPTHER

## 2021-02-23 NOTE — PROGRESS NOTES
Seizures began in infancy. Was on Depakote as a sole agent. Then on Lamictal.  Then on Lamictal and Keppra. Seizures are usually just staring episodes where he is not in contact with his surroundings and has some head movements.  Developmental delay 1996    Due to difficult birth.    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     Family History   Problem Relation Age of Onset    Hypertension Mother     No Known Problems Father     Asthma Sister     Diabetes Brother     Arthritis Maternal Grandmother     Hypertension Maternal Grandmother     High Cholesterol Maternal Grandmother     Hypertension Maternal Grandfather      Social History     Socioeconomic History    Marital status: Single     Spouse name: None    Number of children: None    Years of education: None    Highest education level: None   Occupational History    Occupation: [employer]     Employer: [employer]     Comment: alejandro garcía   Social Needs    Financial resource strain: None    Food insecurity     Worry: None     Inability: None    Transportation needs     Medical: None     Non-medical: None   Tobacco Use    Smoking status: Never Smoker    Smokeless tobacco: Never Used   Substance and Sexual Activity    Alcohol use: No     Comment: never drinks alcohol    Drug use: No    Sexual activity: Not Currently     Partners: Female   Lifestyle    Physical activity     Days per week: None     Minutes per session: None    Stress: None   Relationships    Social connections     Talks on phone: None     Gets together: None     Attends Yazidism service: None     Active member of club or organization: None     Attends meetings of clubs or organizations: None     Relationship status: None    Intimate partner violence     Fear of current or ex partner: None     Emotionally abused: None     Physically abused: None     Forced sexual activity: None   Other Topics Concern    None   Social History Narrative    None        Objective:  Exam:  BP 139/87   Pulse 78   Ht 5' 7\" (1.702 m)   Wt 211 lb (95.7 kg)   BMI 33.05 kg/m²   This is a well-nourished patient in no acute distress  Patient is awake, alert and oriented x3. Speech is normal.  Pupils are equal round reacting to light. Extraocular movements intact. Face symmetrical. Tongue midline. Motor exam shows normal symmetrical strength. Deep tendon reflexes normal. Plantar reflexes downgoing. Sensory exam normal. Coordination normal. Gait normal. No carotid bruit. No neck stiffness. Data :  LABS:  General Labs:    CBC:   Lab Results   Component Value Date    WBC 5.1 04/24/2020    RBC 5.19 04/24/2020    HGB 16.0 04/24/2020    HCT 47.2 04/24/2020    MCV 90.9 04/24/2020    MCH 30.8 04/24/2020    MCHC 33.9 04/24/2020    RDW 12.5 04/24/2020     04/24/2020    MPV 8.0 04/24/2020     BMP:    Lab Results   Component Value Date     04/24/2020    K 4.2 04/24/2020    K 3.8 04/07/2020     04/24/2020    CO2 25 04/24/2020    BUN 11 04/24/2020    LABALBU 4.8 04/24/2020    CREATININE 0.8 04/24/2020    CALCIUM 9.5 04/24/2020    GFRAA >60 04/24/2020    LABGLOM >60 04/24/2020    GLUCOSE 145 04/24/2020       Impression :  Partial complex seizures, usually well controlled as long as he takes his medication regularly  Developmental delay  EEG done at [facility] in 2011 was abnormal with bursts of spikes and polyspike in the bilateral frontal regions with diffuse spread  MRI brain done at [facility] in 2009 was normal  Last seizure was approximately an July 2020 and at that time he had missed his medicines. Plan :  Discussed with patient and his mother  Continue same dose of Keppra as well as lamotrigine  Stressed the importance of taking medications regularly without fail and not missing doses  Prescriptions refilled  Return in 6 months      Please note a portion of  this chart was generated using dragon dictation software.  Although every effort was made to ensure the accuracy of this automated transcription, some errors in transcription may have occurred.

## 2021-03-01 ENCOUNTER — APPOINTMENT (OUTPATIENT)
Dept: CT IMAGING | Age: 26
End: 2021-03-01
Payer: COMMERCIAL

## 2021-03-01 ENCOUNTER — HOSPITAL ENCOUNTER (EMERGENCY)
Age: 26
Discharge: HOME | End: 2021-03-01
Payer: COMMERCIAL

## 2021-03-01 VITALS
RESPIRATION RATE: 16 BRPM | SYSTOLIC BLOOD PRESSURE: 146 MMHG | HEART RATE: 76 BPM | BODY MASS INDEX: 33.67 KG/M2 | OXYGEN SATURATION: 98 % | DIASTOLIC BLOOD PRESSURE: 88 MMHG | WEIGHT: 214.95 LBS | TEMPERATURE: 97 F

## 2021-03-01 DIAGNOSIS — G40.919 BREAKTHROUGH SEIZURE (HCC): Primary | ICD-10-CM

## 2021-03-01 LAB
A/G RATIO: 1.6 (ref 1.1–2.2)
ALBUMIN SERPL-MCNC: 4.8 G/DL (ref 3.4–5)
ALP BLD-CCNC: 82 U/L (ref 40–129)
ALT SERPL-CCNC: 12 U/L (ref 10–40)
ANION GAP SERPL CALCULATED.3IONS-SCNC: 10 MMOL/L (ref 3–16)
AST SERPL-CCNC: 18 U/L (ref 15–37)
BASOPHILS ABSOLUTE: 0 K/UL (ref 0–0.2)
BASOPHILS RELATIVE PERCENT: 0.2 %
BILIRUB SERPL-MCNC: <0.2 MG/DL (ref 0–1)
BUN BLDV-MCNC: 12 MG/DL (ref 7–20)
CALCIUM SERPL-MCNC: 9.3 MG/DL (ref 8.3–10.6)
CHLORIDE BLD-SCNC: 102 MMOL/L (ref 99–110)
CO2: 27 MMOL/L (ref 21–32)
CREAT SERPL-MCNC: 1 MG/DL (ref 0.9–1.3)
EKG ATRIAL RATE: 78 BPM
EKG DIAGNOSIS: NORMAL
EKG P AXIS: 60 DEGREES
EKG P-R INTERVAL: 180 MS
EKG Q-T INTERVAL: 364 MS
EKG QRS DURATION: 96 MS
EKG QTC CALCULATION (BAZETT): 414 MS
EKG R AXIS: 61 DEGREES
EKG T AXIS: 12 DEGREES
EKG VENTRICULAR RATE: 78 BPM
EOSINOPHILS ABSOLUTE: 0 K/UL (ref 0–0.6)
EOSINOPHILS RELATIVE PERCENT: 0.1 %
GFR AFRICAN AMERICAN: >60
GFR NON-AFRICAN AMERICAN: >60
GLOBULIN: 3 G/DL
GLUCOSE BLD-MCNC: 104 MG/DL (ref 70–99)
HCT VFR BLD CALC: 47.8 % (ref 40.5–52.5)
HEMOGLOBIN: 15.9 G/DL (ref 13.5–17.5)
KEPPRA DOSE AMT: ABNORMAL
KEPPRA: <2 UG/ML (ref 6–46)
LYMPHOCYTES ABSOLUTE: 1.7 K/UL (ref 1–5.1)
LYMPHOCYTES RELATIVE PERCENT: 28.3 %
MCH RBC QN AUTO: 30.3 PG (ref 26–34)
MCHC RBC AUTO-ENTMCNC: 33.2 G/DL (ref 31–36)
MCV RBC AUTO: 91.1 FL (ref 80–100)
MONOCYTES ABSOLUTE: 0.5 K/UL (ref 0–1.3)
MONOCYTES RELATIVE PERCENT: 8.5 %
NEUTROPHILS ABSOLUTE: 3.7 K/UL (ref 1.7–7.7)
NEUTROPHILS RELATIVE PERCENT: 62.9 %
PDW BLD-RTO: 12.9 % (ref 12.4–15.4)
PLATELET # BLD: 302 K/UL (ref 135–450)
PMV BLD AUTO: 7.7 FL (ref 5–10.5)
POTASSIUM SERPL-SCNC: 3.9 MMOL/L (ref 3.5–5.1)
RBC # BLD: 5.25 M/UL (ref 4.2–5.9)
SODIUM BLD-SCNC: 139 MMOL/L (ref 136–145)
TOTAL PROTEIN: 7.8 G/DL (ref 6.4–8.2)
WBC # BLD: 5.8 K/UL (ref 4–11)

## 2021-03-01 PROCEDURE — 85025 COMPLETE CBC W/AUTO DIFF WBC: CPT

## 2021-03-01 PROCEDURE — 80053 COMPREHEN METABOLIC PANEL: CPT

## 2021-03-01 PROCEDURE — 99284 EMERGENCY DEPT VISIT MOD MDM: CPT

## 2021-03-01 PROCEDURE — 80177 DRUG SCRN QUAN LEVETIRACETAM: CPT

## 2021-03-01 PROCEDURE — 72125 CT NECK SPINE W/O DYE: CPT

## 2021-03-01 PROCEDURE — 70450 CT HEAD/BRAIN W/O DYE: CPT

## 2021-03-01 PROCEDURE — 96365 THER/PROPH/DIAG IV INF INIT: CPT

## 2021-03-01 PROCEDURE — 93010 ELECTROCARDIOGRAM REPORT: CPT | Performed by: INTERNAL MEDICINE

## 2021-03-01 PROCEDURE — 6360000002 HC RX W HCPCS: Performed by: PHYSICIAN ASSISTANT

## 2021-03-01 PROCEDURE — 93005 ELECTROCARDIOGRAM TRACING: CPT | Performed by: PHYSICIAN ASSISTANT

## 2021-03-01 RX ORDER — LEVETIRACETAM 10 MG/ML
1000 INJECTION INTRAVASCULAR ONCE
Status: COMPLETED | OUTPATIENT
Start: 2021-03-01 | End: 2021-03-01

## 2021-03-01 RX ADMIN — LEVETIRACETAM 1000 MG: 10 INJECTION INTRAVENOUS at 13:44

## 2021-03-01 ASSESSMENT — ENCOUNTER SYMPTOMS
SHORTNESS OF BREATH: 0
COUGH: 0
RHINORRHEA: 0
NAUSEA: 0
DIARRHEA: 0
ABDOMINAL PAIN: 0
VOMITING: 0

## 2021-03-01 NOTE — ED PROVIDER NOTES
[facility]        Pt Name: Marian Escobar  MRN: [id number]  [place] 1995  Date of evaluation: 3/1/2021  Provider: Jodee Closs, PA-C  PCP: Andreina Reynolds DO    MIA. I have evaluated this patient. My supervising physician was available for consultation. CHIEF COMPLAINT       Chief Complaint   Patient presents with    Seizures       HISTORY OF PRESENT ILLNESS   (Location, Timing/Onset, Context/Setting, Quality, Duration, Modifying Factors, Severity, Associated Signs and Symptoms)  Note limiting factors. Marian Escobar is a 26 y.o. male presents to the emergency department today for evaluation for a seizure. The patient has a history of seizures, and has a history of developmental delay. The patient is on Keppra as well as Lamictal at home for seizures. Apparently the patient was at home with grandma, unsure if he lives with grandma. She states that she heard him fall, and when she went upstairs the patient was having a seizure. EMS arrived, the patient was postictal however seems to be back to his baseline at this time. When the patient arrives to the ED he has no complaints. He has no headaches. No visual changes. He has no numbness, tingling or weakness. He has no chest pain or shortness of breath. Patient denies any neck pain. No recent illnesses. No other complaints at this time    Nursing Notes were all reviewed and agreed with or any disagreements were addressed in the HPI. REVIEW OF SYSTEMS    (2-9 systems for level 4, 10 or more for level 5)     Review of Systems   Constitutional: Negative for activity change, appetite change, chills and fever. HENT: Negative for congestion and rhinorrhea. Eyes: Negative for visual disturbance. Respiratory: Negative for cough and shortness of breath. Cardiovascular: Negative for chest pain.    Gastrointestinal: Negative for abdominal pain, diarrhea, nausea and vomiting. Genitourinary: Negative for difficulty urinating, dysuria and hematuria. Neurological: Positive for seizures. Negative for weakness, numbness and headaches. Positives and Pertinent negatives as per HPI. Except as noted above in the ROS, all other systems were reviewed and negative. 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 Seizures began in infancy. Was on Depakote as a sole agent. Then on Lamictal.  Then on Lamictal and Keppra. Seizures are usually just staring episodes where he is not in contact with his surroundings and has some head movements.  Developmental delay 1996    Due to difficult birth. 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         SURGICAL HISTORY     Past Surgical History:   Procedure Laterality Date    TONSILLECTOMY           CURRENTMEDICATIONS       Discharge Medication List as of 3/1/2021  4:07 PM      CONTINUE these medications which have NOT CHANGED    Details   levETIRAcetam (KEPPRA) 500 MG tablet TAKE 1 TABLET BY MOUTH TWICE A DAY, Disp-60 tablet, R-5Normal      lamoTRIgine (LAMICTAL) 25 MG tablet TAKE 5 TABLETS BY MOUTH TWICE A DAY, Disp-300 tablet, R-5Normal      ibuprofen (ADVIL;MOTRIN) 800 MG tablet Take 1 tablet by mouth every 8 hours as needed for Pain, Disp-20 tablet,R-0Print               ALLERGIES     Patient has no known allergies.     FAMILYHISTORY       Family History   Problem Relation Age of Onset    Hypertension Mother     No Known Problems Father     Asthma Sister     Diabetes Brother     Arthritis Maternal Grandmother     Hypertension Maternal Grandmother     Comments: No midline spinal tenderness   Skin:     General: Skin is warm and dry. Neurological:      Mental Status: He is alert and oriented to person, place, and time. GCS: GCS eye subscore is 4. GCS verbal subscore is 5. GCS motor subscore is 6. Psychiatric:         Behavior: Behavior normal.         DIAGNOSTIC RESULTS   LABS:    Labs Reviewed   COMPREHENSIVE METABOLIC PANEL - Abnormal; Notable for the following components:       Result Value    Glucose 104 (*)     All other components within normal limits    Narrative:     Performed at:  [facility] [address],  [place], [place]   Phone [phone]   CBC WITH AUTO DIFFERENTIAL    Narrative:     Performed at:  [facility] [address],  [place], [address]   Phone [phone]   LEVETIRACETAM LEVEL    Narrative:     Performed at:  [place] [address]   Phone [phone]       All other labs were within normal range or not returned as of this dictation. EKG: All EKG's are interpreted by the Emergency Department Physician in the absence of a cardiologist.  Please see their note for interpretation of EKG. RADIOLOGY:   Non-plain film images such as CT, Ultrasound and MRI are read by the radiologist. Plain radiographic images are visualized and preliminarily interpreted by the ED Provider with the below findings:        Interpretation per the Radiologist below, if available at the time of this note:    CT CERVICAL SPINE WO CONTRAST   Final Result   No acute abnormality of the cervical spine. CT HEAD WO CONTRAST   Final Result   No acute intracranial abnormality.            Ct Head Wo Contrast    Result Date: 3/1/2021  EXAMINATION: CT OF THE HEAD WITHOUT CONTRAST  3/1/2021 1:55 pm TECHNIQUE: CT of the head was performed without the administration of intravenous contrast. Dose modulation, iterative reconstruction, and/or weight based adjustment of the mA/kV was utilized to reduce the radiation dose to as low as reasonably achievable. COMPARISON: None. HISTORY: ORDERING SYSTEM PROVIDED HISTORY: fall TECHNOLOGIST PROVIDED HISTORY: Has a \"code stroke\" or \"stroke alert\" been called? ->No Reason for exam:->fall Decision Support Exception->Emergency Medical Condition (MA) Reason for Exam: fall Acuity: Acute Type of Exam: Initial FINDINGS: BRAIN/VENTRICLES: There is no acute intracranial hemorrhage, mass effect or midline shift. No abnormal extra-axial fluid collection. The gray-white differentiation is maintained without evidence of an acute infarct. There is no evidence of hydrocephalus. ORBITS: The visualized portion of the orbits demonstrate no acute abnormality. SINUSES: The visualized paranasal sinuses and mastoid air cells demonstrate no acute abnormality. SOFT TISSUES/SKULL:  No acute abnormality of the visualized skull or soft tissues. No acute intracranial abnormality. Ct Cervical Spine Wo Contrast    Result Date: 3/1/2021  EXAMINATION: CT OF THE CERVICAL SPINE WITHOUT CONTRAST 3/1/2021 1:55 pm TECHNIQUE: CT of the cervical spine was performed without the administration of intravenous contrast. Multiplanar reformatted images are provided for review. Dose modulation, iterative reconstruction, and/or weight based adjustment of the mA/kV was utilized to reduce the radiation dose to as low as reasonably achievable. COMPARISON: None. HISTORY: ORDERING SYSTEM PROVIDED HISTORY: fall TECHNOLOGIST PROVIDED HISTORY: Reason for exam:->fall Decision Support Exception->Emergency Medical Condition (MA) Reason for Exam: fall Acuity: Acute Type of Exam: Initial Neck pain. FINDINGS: BONES/ALIGNMENT: There is no acute fracture or traumatic malalignment. DEGENERATIVE CHANGES: No significant degenerative changes. SOFT TISSUES: There is no prevertebral soft tissue swelling.      No acute abnormality of the cervical spine. PROCEDURES   Unless otherwise noted below, none     Procedures    CRITICAL CARE TIME   N/A    CONSULTS:  None      EMERGENCY DEPARTMENT COURSE and DIFFERENTIAL DIAGNOSIS/MDM:   Vitals:    Vitals:    03/01/21 1320   BP: (!) 146/88   Pulse: 76   Resp: 16   Temp: 97 °F (36.1 °C)   TempSrc: Oral   SpO2: 98%   Weight: 214 lb 15.2 oz (97.5 kg)       Patient was given the following medications:  Medications   levetiracetam (KEPPRA) 1000 mg/100 mL IVPB (0 mg Intravenous Stopped 3/1/21 1432)           Briefly, this is a 80-year-old male with a known history of seizure disorder who presents emergency department today after a seizure. Patient is on Lamictal as well as Keppra, as far as we know the patient is compliant with medications. EMS was called after grandma heard the patient fall, patient did have a seizure. Was initially postictal but is now baseline. Physical exam the patient has no complaints, patient has no focal neurological deficits. EKG is documented by Dr. Jeanne Pena, please see his note for further details. CBC shows no evidence of leukocytosis or anemia. CMP is unremarkable. Keppra level is pending. The patient was given a gram of Keppra. T of head and cervical spine obtained and are negative    Patient has been observed in the emergency room for almost 4 hours, he has not had any other seizures, continues to remain at baseline. He is noted to be ambulating throughout the emergency room dyslipidemia managed as an outpatient. He is to obtain follow-up with his primary care physician within 2 to 3 days for reevaluation. He is to return to the ED for any new or worsening symptoms, patient voiced understanding and is agreeable with plan. Stable for discharge.   My suspicions at this time for acute intracranial hemorrhage, subarachnoid hemorrhage, epidural hematoma, subdural hematoma, skull fracture, life-threatening arrhythmia, status epilepticus or other emergent etiology      FINAL IMPRESSION      1.  Breakthrough seizure [facility])          DISPOSITION/PLAN   DISPOSITION Decision To Discharge 03/01/2021 03:55:10 PM      PATIENT REFERREDTO:  Michael Raciel Leticia [address]  [phone]    Schedule an appointment as soon as possible for a visit in 2 days      [facility] Emergency Department  [address] [place], [place] [phone]    As needed, If symptoms worsen      DISCHARGE MEDICATIONS:  Discharge Medication List as of 3/1/2021  4:07 PM          DISCONTINUED MEDICATIONS:  Discharge Medication List as of 3/1/2021  4:07 PM                 (Please note that portions of this note were completed with a voice recognition program.  Efforts were made to edit the dictations but occasionally words are mis-transcribed.)    John Jernigan PA-C (electronically signed)            John Jernigan PA-C  03/01/21 4165

## 2021-03-01 NOTE — ED NOTES
Pt called family for ride home, states they are on the way. Pt getting dressed.       Jodie Yuen RN  03/01/21 3165

## 2021-03-02 ENCOUNTER — TELEPHONE (OUTPATIENT)
Dept: FAMILY MEDICINE CLINIC | Age: 26
End: 2021-03-02

## 2021-03-02 NOTE — TELEPHONE ENCOUNTER
Needs visit for note. May schedule in any slot video or otherwise for an in person visit. Call and inform patient he needs a note. Alternatively he can see the neurologist for this note if he wishes.

## 2021-03-02 NOTE — TELEPHONE ENCOUNTER
PT CALLING TO GET A NOTE TO GO BACK TO WORK - 03/03/21    HE HAS BEEN OFF WORK FOR 1 WEEK -  HE HAD A SEIZURE    FAX:  WILL CALL BACK WITH IT

## 2021-08-04 ENCOUNTER — OFFICE VISIT (OUTPATIENT)
Dept: FAMILY MEDICINE CLINIC | Age: 26
End: 2021-08-04
Payer: COMMERCIAL

## 2021-08-04 VITALS
BODY MASS INDEX: 31.08 KG/M2 | HEIGHT: 67 IN | HEART RATE: 91 BPM | WEIGHT: 198 LBS | DIASTOLIC BLOOD PRESSURE: 70 MMHG | RESPIRATION RATE: 20 BRPM | SYSTOLIC BLOOD PRESSURE: 110 MMHG | OXYGEN SATURATION: 97 %

## 2021-08-04 DIAGNOSIS — T14.8XXA BRUISING: Primary | ICD-10-CM

## 2021-08-04 DIAGNOSIS — E66.09 CLASS 2 OBESITY DUE TO EXCESS CALORIES WITHOUT SERIOUS COMORBIDITY WITH BODY MASS INDEX (BMI) OF 35.0 TO 35.9 IN ADULT: ICD-10-CM

## 2021-08-04 PROCEDURE — G8427 DOCREV CUR MEDS BY ELIG CLIN: HCPCS | Performed by: FAMILY MEDICINE

## 2021-08-04 PROCEDURE — 99213 OFFICE O/P EST LOW 20 MIN: CPT | Performed by: FAMILY MEDICINE

## 2021-08-04 PROCEDURE — G8417 CALC BMI ABV UP PARAM F/U: HCPCS | Performed by: FAMILY MEDICINE

## 2021-08-04 PROCEDURE — 1036F TOBACCO NON-USER: CPT | Performed by: FAMILY MEDICINE

## 2021-08-04 SDOH — ECONOMIC STABILITY: TRANSPORTATION INSECURITY
IN THE PAST 12 MONTHS, HAS THE LACK OF TRANSPORTATION KEPT YOU FROM MEDICAL APPOINTMENTS OR FROM GETTING MEDICATIONS?: NO

## 2021-08-04 SDOH — ECONOMIC STABILITY: TRANSPORTATION INSECURITY
IN THE PAST 12 MONTHS, HAS LACK OF TRANSPORTATION KEPT YOU FROM MEETINGS, WORK, OR FROM GETTING THINGS NEEDED FOR DAILY LIVING?: YES

## 2021-08-04 SDOH — ECONOMIC STABILITY: TRANSPORTATION INSECURITY
IN THE PAST 12 MONTHS, HAS THE LACK OF TRANSPORTATION KEPT YOU FROM MEDICAL APPOINTMENTS OR FROM GETTING MEDICATIONS?: YES

## 2021-08-04 SDOH — ECONOMIC STABILITY: TRANSPORTATION INSECURITY
IN THE PAST 12 MONTHS, HAS LACK OF TRANSPORTATION KEPT YOU FROM MEETINGS, WORK, OR FROM GETTING THINGS NEEDED FOR DAILY LIVING?: NO

## 2021-08-04 ASSESSMENT — PATIENT HEALTH QUESTIONNAIRE - PHQ9
SUM OF ALL RESPONSES TO PHQ QUESTIONS 1-9: 0
SUM OF ALL RESPONSES TO PHQ QUESTIONS 1-9: 0
1. LITTLE INTEREST OR PLEASURE IN DOING THINGS: 0
SUM OF ALL RESPONSES TO PHQ QUESTIONS 1-9: 0
SUM OF ALL RESPONSES TO PHQ9 QUESTIONS 1 & 2: 0
2. FEELING DOWN, DEPRESSED OR HOPELESS: 0

## 2021-08-04 NOTE — PATIENT INSTRUCTIONS
Tayler Hurt was seen today for bleeding/bruising. Diagnoses and all orders for this visit:    Bruising  Your platelets were normal on 3/1/2021:   Ref. Range 4/24/2020 15:59   Platelet Count  467 - 450 K/uL 279   Your bruising is likely because you scraped yourself. Class 2 obesity due to excess calories without serious comorbidity with body mass index (BMI) of 35.0 to 35.9 in adult  Congratulations on the weight loss of 27 lbs. Keep up the good work! This will help keep you from getting diabetes. Keep walking!   Vitals with Age-Percentiles 8/4/2021 3/1/2021 2/23/2021 8/31/2020   Weight (kg) 89.812 kg 97.5 kg 95.709 kg 95.709 kg   Weight (lb) 198 lb 214 lb 15.2 oz 211 lb 211 lb     Vitals with Age-Percentiles 8/7/2020 5/8/2020   Weight (kg) 102.059 kg 102.059 kg   Weight (lb) 225 lb 225 lb

## 2021-08-04 NOTE — PROGRESS NOTES
Alden Bennett (:  1995) is a 26 y.o. male,Established patient, here for evaluation of the following chief complaint(s):  Bleeding/Bruising (pt c/o bruise on the side of his right arm that he noticed yesterday getting out of the shower. no injury to it that he is aware of. )       ASSESSMENT/PLAN:  530    Patient Instructions     Mendoza Crowley was seen today for bleeding/bruising. Diagnoses and all orders for this visit:    Bruising  Your platelets were normal on 3/1/2021:   Ref. Range 2020 15:59   Platelet Count  447 - 450 K/uL 279   Your bruising is likely because you scraped yourself. Class 2 obesity due to excess calories without serious comorbidity with body mass index (BMI) of 35.0 to 35.9 in adult  Congratulations on the weight loss of 27 lbs. Keep up the good work! This will help keep you from getting diabetes. Keep walking! Vitals with Age-Percentiles 2021 3/1/2021 2021 2020   Weight (kg) 89.812 kg 97.5 kg 95.709 kg 95.709 kg   Weight (lb) 198 lb 214 lb 15.2 oz 211 lb 211 lb     Vitals with Age-Percentiles 2020   Weight (kg) 102.059 kg 102.059 kg   Weight (lb) 225 lb 225 lb     Return in about 1 year (around 2022) for Physical next year. Subjective   SUBJECTIVE/OBJECTIVE:  Chief Complaint   Patient presents with    Bleeding/Bruising     pt c/o bruise on the side of his right arm that he noticed yesterday getting out of the shower. no injury to it that he is aware of.    503  HPI    Bruising   He had a sudden onset of bruising at one site. No other bruising. No bleeding problems. No known injury other than a slight bump getting out of the shower to his right arm. 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 None since. Class 2 obesity due to excess calories without serious comorbidity with body mass index (BMI) of 35.0 to 35.9 in adult  Walking a lot now.   Has lost significant weight. 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 Seizures began in infancy. Was on Depakote as a sole agent. Then on Lamictal.  Then on Lamictal and Keppra. Seizures are usually just staring episodes where he is not in contact with his surroundings and has some head movements.  Developmental delay 1996    Due to difficult birth. 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       Past Surgical History:   Procedure Laterality Date    TONSILLECTOMY         Social History     Socioeconomic History    Marital status: Single     Spouse name: Not on file    Number of children: Not on file    Years of education: Not on file    Highest education level: Not on file   Occupational History    Occupation: [employer]     Employer: [employer]     Comment: alejandro garcía    Occupation: unemployed   Tobacco Use    Smoking status: Never Smoker    Smokeless tobacco: Never Used   Vaping Use    Vaping Use: Never used   Substance and Sexual Activity    Alcohol use: No     Comment: never drinks alcohol    Drug use: No    Sexual activity: Not Currently     Partners: Female   Other Topics Concern    Not on file   Social History Narrative    No exercise. 8/4/21. Social Determinants of Health     Financial Resource Strain:     Difficulty of Paying Living Expenses:    Food Insecurity:     Worried About Running Out of Food in the Last Year:     [address] in the Last Year:    Transportation Needs: No Transportation Needs    Lack of Transportation (Medical): No    Lack of Transportation (Non-Medical):  No   Physical Activity:     Days of Exercise per Week:     Minutes of Exercise per Session:    Stress:     Feeling of Stress :    Social Connections:     Frequency of Communication with Friends and Family:     Frequency of Social Gatherings with Friends and Family:     Attends Caodaism Services:     Active Member of Clubs or Organizations:     Attends Club or Organization Meetings:     Marital Status:    Intimate Partner Violence:     Fear of Current or Ex-Partner:     Emotionally Abused:     Physically Abused:     Sexually Abused:        Family History   Problem Relation Age of Onset    Hypertension Mother     No Known Problems Father     Asthma Sister     Diabetes Brother     Arthritis Maternal Grandmother     Hypertension Maternal Grandmother     High Cholesterol Maternal Grandmother     Hypertension Maternal Grandfather        No Known Allergies    Current Outpatient Medications   Medication Sig Dispense Refill    ibuprofen (ADVIL;MOTRIN) 800 MG tablet Take 1 tablet by mouth every 8 hours as needed for Pain 20 tablet 0    lamoTRIgine (LAMICTAL) 25 MG tablet TAKE 5 TABLETS BY MOUTH TWICE A  tablet 5    levETIRAcetam (KEPPRA) 500 MG tablet TAKE 1 TABLET BY MOUTH TWICE A  tablet 1     No current facility-administered medications for this visit. Objective   Physical Exam  Vitals and nursing note reviewed. Constitutional:       General: He is not in acute distress. Appearance: Normal appearance. He is well-developed and well-groomed. He is obese. He is not ill-appearing, toxic-appearing or diaphoretic. HENT:      Head: Normocephalic and atraumatic. Eyes:      General: No scleral icterus. Right eye: No discharge. Left eye: No discharge. Conjunctiva/sclera: Conjunctivae normal.   Neck:      Thyroid: No thyroid mass or thyromegaly. Vascular: No carotid bruit or JVD. Trachea: Trachea and phonation normal. No tracheal tenderness or tracheal deviation.    Cardiovascular: Sodium  139   Potassium  3.9   Chloride  102   CO2  27   BUN  12   Creatinine  1.0   Anion Gap  10   GFR Non-African American  >60   Glucose  104 (H)   Calcium  9.3   Total Protein  7.8   Albumin  4.8   Globulin  3.0   Albumin/Globulin Ratio  1.6   Alk Phos  82   ALT  12   AST  18   Bilirubin  <0.2   Levetiracetam 4.1 (L) <2.0 (L)   WBC  5.8   RBC  5.25   Hemoglobin Quant  15.9   Hematocrit  47.8   MCV  91.1   MCH  30.3   MCHC  33.2   MPV  7.7   RDW  12.9   Platelet Count  795   Neutrophils %  62.9   Lymphocyte %  28.3   Monocytes %  8.5   Eosinophils %  0.1   Basophils %  0.2   Neutrophils Absolute  3.7   Lymphocytes Absolute  1.7   Monocytes Absolute  0.5   Eosinophils Absolute  0.0     EKG 3/1/2021  Normal sinus rhythm  Possible Left atrial enlargement  RSR' or QR pattern in V1 suggests right ventricular conduction delay  Borderline ECG      On this date 8/4/2021 I have spent 27 minutes reviewing previous notes, test results and face to face with the patient discussing the diagnosis and importance of compliance with the treatment plan as well as documenting on the day of the visit. An electronic signature was used to authenticate this note.     --Mary Carmen Hernández,

## 2021-08-10 ENCOUNTER — TELEPHONE (OUTPATIENT)
Dept: NEUROLOGY | Age: 26
End: 2021-08-10

## 2021-08-10 NOTE — TELEPHONE ENCOUNTER
Called home phone @ 1:08pm & 1:14pm. Busy both times. Cell phone has been disconnected. When she calls back/or we reach her we need to know the following: How long do these symptoms last ?    Does it happen when sitting, standing, bending, turning, laying down ? Does it cause vomiting ? Has he started any new meds, antibiotics or been diagnosed with any new medical condition ?

## 2021-08-10 NOTE — TELEPHONE ENCOUNTER
Pt mother phoned states for the last couple of days pt vision has been off balance is off. Pt states he notices it after he takes his medication. Should he go to the ER. Please call mother back.

## 2021-08-12 NOTE — TELEPHONE ENCOUNTER
Been trying to call pt since 8/10/21 however phone remains busy. I was advised today that [id number] [facility] is having phone problems so I called from my cell phone and was able to reach  and [facility]. Advised to r/c call to follow up with pt's concerns.

## 2021-08-15 ENCOUNTER — APPOINTMENT (OUTPATIENT)
Dept: CT IMAGING | Age: 26
End: 2021-08-15
Payer: COMMERCIAL

## 2021-08-15 ENCOUNTER — APPOINTMENT (OUTPATIENT)
Dept: GENERAL RADIOLOGY | Age: 26
End: 2021-08-15
Payer: COMMERCIAL

## 2021-08-15 ENCOUNTER — HOSPITAL ENCOUNTER (EMERGENCY)
Age: 26
Discharge: HOME | End: 2021-08-15
Attending: EMERGENCY MEDICINE
Payer: COMMERCIAL

## 2021-08-15 VITALS
HEIGHT: 67 IN | SYSTOLIC BLOOD PRESSURE: 131 MMHG | HEART RATE: 75 BPM | WEIGHT: 199 LBS | BODY MASS INDEX: 31.23 KG/M2 | DIASTOLIC BLOOD PRESSURE: 89 MMHG | OXYGEN SATURATION: 100 % | RESPIRATION RATE: 14 BRPM | TEMPERATURE: 97.4 F

## 2021-08-15 DIAGNOSIS — R53.83 FATIGUE, UNSPECIFIED TYPE: Primary | ICD-10-CM

## 2021-08-15 LAB
A/G RATIO: 1.5 (ref 1.1–2.2)
ALBUMIN SERPL-MCNC: 4.5 G/DL (ref 3.4–5)
ALP BLD-CCNC: 89 U/L (ref 40–129)
ALT SERPL-CCNC: 10 U/L (ref 10–40)
ANION GAP SERPL CALCULATED.3IONS-SCNC: 8 MMOL/L (ref 3–16)
AST SERPL-CCNC: 17 U/L (ref 15–37)
BASOPHILS ABSOLUTE: 0.1 K/UL (ref 0–0.2)
BASOPHILS RELATIVE PERCENT: 1 %
BILIRUB SERPL-MCNC: <0.2 MG/DL (ref 0–1)
BUN BLDV-MCNC: 10 MG/DL (ref 7–20)
CALCIUM SERPL-MCNC: 9.7 MG/DL (ref 8.3–10.6)
CHLORIDE BLD-SCNC: 105 MMOL/L (ref 99–110)
CO2: 29 MMOL/L (ref 21–32)
CREAT SERPL-MCNC: 1.2 MG/DL (ref 0.9–1.3)
EOSINOPHILS ABSOLUTE: 0 K/UL (ref 0–0.6)
EOSINOPHILS RELATIVE PERCENT: 0.4 %
GFR AFRICAN AMERICAN: >60
GFR NON-AFRICAN AMERICAN: >60
GLOBULIN: 3.1 G/DL
GLUCOSE BLD-MCNC: 78 MG/DL (ref 70–99)
HCT VFR BLD CALC: 46.6 % (ref 40.5–52.5)
HEMOGLOBIN: 15.9 G/DL (ref 13.5–17.5)
LYMPHOCYTES ABSOLUTE: 2.7 K/UL (ref 1–5.1)
LYMPHOCYTES RELATIVE PERCENT: 36.8 %
MCH RBC QN AUTO: 31.2 PG (ref 26–34)
MCHC RBC AUTO-ENTMCNC: 34.2 G/DL (ref 31–36)
MCV RBC AUTO: 91.2 FL (ref 80–100)
MONOCYTES ABSOLUTE: 0.8 K/UL (ref 0–1.3)
MONOCYTES RELATIVE PERCENT: 10.7 %
NEUTROPHILS ABSOLUTE: 3.7 K/UL (ref 1.7–7.7)
NEUTROPHILS RELATIVE PERCENT: 51.1 %
PDW BLD-RTO: 12.9 % (ref 12.4–15.4)
PLATELET # BLD: 289 K/UL (ref 135–450)
PMV BLD AUTO: 7.8 FL (ref 5–10.5)
POTASSIUM SERPL-SCNC: 4.3 MMOL/L (ref 3.5–5.1)
RBC # BLD: 5.1 M/UL (ref 4.2–5.9)
SODIUM BLD-SCNC: 142 MMOL/L (ref 136–145)
TOTAL PROTEIN: 7.6 G/DL (ref 6.4–8.2)
TROPONIN: <0.01 NG/ML
WBC # BLD: 7.3 K/UL (ref 4–11)

## 2021-08-15 PROCEDURE — 84484 ASSAY OF TROPONIN QUANT: CPT

## 2021-08-15 PROCEDURE — 85025 COMPLETE CBC W/AUTO DIFF WBC: CPT

## 2021-08-15 PROCEDURE — 71045 X-RAY EXAM CHEST 1 VIEW: CPT

## 2021-08-15 PROCEDURE — 70450 CT HEAD/BRAIN W/O DYE: CPT

## 2021-08-15 PROCEDURE — 36415 COLL VENOUS BLD VENIPUNCTURE: CPT

## 2021-08-15 PROCEDURE — 93005 ELECTROCARDIOGRAM TRACING: CPT | Performed by: NURSE PRACTITIONER

## 2021-08-15 PROCEDURE — 80053 COMPREHEN METABOLIC PANEL: CPT

## 2021-08-15 PROCEDURE — 99283 EMERGENCY DEPT VISIT LOW MDM: CPT

## 2021-08-15 ASSESSMENT — ENCOUNTER SYMPTOMS
VOMITING: 0
SHORTNESS OF BREATH: 0
ABDOMINAL PAIN: 0
NAUSEA: 0
CHEST TIGHTNESS: 0
DIARRHEA: 0

## 2021-08-15 NOTE — ED PROVIDER NOTES
[facility]        Pt Name: Joselin Licona  MRN: [id number]  [place] 1995  Date of evaluation: 8/15/2021  Provider: MARION Baird CNP  PCP: Valentín Smith DO  Note Started: 7:05 PM EDT        I have seen and evaluated this patient with my supervising physician Erica Palacios MD.    [address] [place] [address]       Chief Complaint   Patient presents with    Medication Reaction     Patient reports he takes medications for his seizures - believes his weakness in legs are related to his medication. Doesn't know the name of medication. HISTORY OF PRESENT ILLNESS   (Location, Timing/Onset, Context/Setting, Quality, Duration, Modifying Factors, Severity, Associated Signs and Symptoms)  Note limiting factors. Chief Complaint: audrey Licona is a 26 y.o. male who presents to the emergency department complaining of either falling or sitting down on the sidewalk last week. Patient reports that he has history of seizures, did not have a seizure. He has not had a medication change since 2017. Compliant with medications. Patient is a poor historian, he was dropped off by his grandmother. There is no family at bedside. Patient tells me that he is here for weakness. Believes that his weakness is due to seizure medication but has not been changed. He reports weakness in bilateral legs. He does not mention any balance or visual disturbances. He reports one episode last week where he either fell or sat down on the sidewalk, he was taken home by a stranger. The patient is ambulatory without difficulty. Straight leg lift is normal both sides. Normal sensory without loss. No back pain. No injury. Denies any headache, fever, lightheadedness, dizziness. No chest pain or pressure. No neck or back pain. No shortness of breath, cough, or congestion.   No abdominal pain, nausea, vomiting, diarrhea, constipation, or dysuria. No rash. Nursing Notes were all reviewed and agreed with or any disagreements were addressed in the HPI. REVIEW OF SYSTEMS    (2-9 systems for level 4, 10 or more for level 5)     Review of Systems   Constitutional: Negative for activity change, chills and fever. Respiratory: Negative for chest tightness and shortness of breath. Cardiovascular: Negative for chest pain. Gastrointestinal: Negative for abdominal pain, diarrhea, nausea and vomiting. Genitourinary: Negative for dysuria. All other systems reviewed and are negative. Positives and Pertinent negatives as per HPI. Except as noted above in the ROS, all other systems were reviewed and negative. 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 Seizures began in infancy. Was on Depakote as a sole agent. Then on Lamictal.  Then on Lamictal and Keppra. Seizures are usually just staring episodes where he is not in contact with his surroundings and has some head movements.  Developmental delay 1996    Due to difficult birth.    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         SURGICAL HISTORY     Past Surgical History:   Procedure Laterality Date    TONSILLECTOMY           CURRENTMEDICATIONS       Discharge Medication List as of 8/15/2021  8:42 PM      CONTINUE these medications which have NOT CHANGED    Details   levETIRAcetam (KEPPRA) 500 MG tablet TAKE 1 TABLET BY MOUTH TWICE A DAY, Disp-60 tablet, R-5Normal      lamoTRIgine (LAMICTAL) 25 MG tablet TAKE 5 TABLETS BY MOUTH TWICE A DAY, Disp-300 tablet, R-5Normal      ibuprofen (ADVIL;MOTRIN) 800 MG tablet Take 1 tablet by mouth every 8 hours as needed for Pain, Disp-20 tablet,R-0Print               ALLERGIES     Patient has no known allergies. FAMILYHISTORY       Family History   Problem Relation Age of Onset    Hypertension Mother     No Known Problems Father     Asthma Sister     Diabetes Brother     Arthritis Maternal Grandmother     Hypertension Maternal Grandmother     High Cholesterol Maternal Grandmother     Hypertension Maternal Grandfather           SOCIAL HISTORY       Social History     Tobacco Use    Smoking status: Never Smoker    Smokeless tobacco: Never Used   Vaping Use    Vaping Use: Never used   Substance Use Topics    Alcohol use: No     Comment: never drinks alcohol    Drug use: No       SCREENINGS             PHYSICAL EXAM    (up to 7 for level 4, 8 or more for level 5)     ED Triage Vitals [08/15/21 1822]   BP Temp Temp Source Pulse Resp SpO2 Height Weight   131/89 97.4 °F (36.3 °C) Oral 75 14 100 % 5' 7\" (1.702 m) 199 lb (90.3 kg)       Physical Exam  Vitals and nursing note reviewed. Constitutional:       Appearance: He is well-developed. He is not diaphoretic. HENT:      Head: Normocephalic and atraumatic. Right Ear: External ear normal.      Left Ear: External ear normal.   Eyes:      General:         Right eye: No discharge. Left eye: No discharge. Neck:      Vascular: No JVD. Cardiovascular:      Rate and Rhythm: Normal rate and regular rhythm. Pulses: Normal pulses. Heart sounds: Normal heart sounds. Pulmonary:      Effort: Pulmonary effort is normal. No respiratory distress. Breath sounds: Normal breath sounds. Abdominal:      Palpations: Abdomen is soft. Musculoskeletal:         General: Normal range of motion. Cervical back: Normal range of motion and neck supple. Skin:     General: Skin is warm and dry. Coloration: Skin is not pale.    Neurological:      Mental Status: He is alert and oriented to person, place, and time. Cranial Nerves: Cranial nerves are intact. Sensory: Sensation is intact. Motor: Motor function is intact. No weakness. Coordination: Coordination is intact. Psychiatric:         Behavior: Behavior normal.         DIAGNOSTIC RESULTS   LABS:    Labs Reviewed   CBC WITH AUTO DIFFERENTIAL    Narrative:     Performed at:  [facility] [address] [facility], [address] [facility]   Phone [phone]   COMPREHENSIVE METABOLIC PANEL    Narrative:     Performed at:  [facility] [address], [place], [address] [facility]   Phone [phone]   TROPONIN    Narrative:     Performed at:  [facility] [address], [place], [address] [facility]   Phone [phone]       When ordered only abnormal lab results are displayed. All other labs were within normal range or not returned as of this dictation. EKG: When ordered, EKG's are interpreted by the Emergency Department Physician in the absence of a cardiologist.  Please see their note for interpretation of EKG. RADIOLOGY:   Non-plain film images such as CT, Ultrasound and MRI are read by the radiologist. Plain radiographic images are visualized and preliminarily interpreted by the ED Provider with the below findings:        Interpretation per the Radiologist below, if available at the time of this note:    CT HEAD WO CONTRAST   Final Result   1. No acute hemorrhage or midline shift. 2. Other findings as described. XR CHEST PORTABLE   Final Result   Questionable lingular consolidation. No results found.         PROCEDURES   Unless otherwise noted below, none     Procedures    CRITICAL CARE TIME   N/A    CONSULTS:  None      EMERGENCY DEPARTMENT COURSE and DIFFERENTIAL DIAGNOSIS/MDM:   Vitals:    Vitals:    08/15/21 1822   BP: 131/89   Pulse: 75   Resp: 14   Temp: 97.4 °F (36.3 °C)   TempSrc: Oral   SpO2: 100% Weight: 199 lb (90.3 kg)   Height: 5' 7\" (1.702 m)       Patient was given the following medications:  Medications - No data to display        Briefly, this is a 26year old male that presents to the emergency department after reporting a fall versus stating down on the sidewalk last week. I did ask attending physician to see this patient, the patient told Dr. Jarek Leung that he is being evaluated after telling loved ones what happened last week. Patient had no focal weakness. Labs including troponin, CBC, CMP were unremarkable. CT of the head without contrast was completed, unfortunately there is some artifact with study but no acute hemorrhage or midline shift was noted. Patient was reevaluated, remains asymptomatic. Patient is instructed to follow-up with primary care as well as neurology. Patient has a normal repeat neurological exam. At this time there is no indication of head injury, encephalopathy, multiple sclerosis, TIA/CVA, seizures, dehydration, hypoglycemia, mass lesions, metabolic cause, cardiac arrhythmia, PE, GI bleeding or orthostatic hypotension. Patient is stable throughout ED course and safe for outpatient follow-up. Patient made aware of treatment plan and verbally understood and agreed. Patient stable for outpatient follow-up with their family doctor in 24-48 hours. FINAL IMPRESSION      1.  Fatigue, unspecified type          DISPOSITION/PLAN   DISPOSITION Decision To Discharge 08/15/2021 08:35:37 PM      PATIENT REFERRED TO:  Leticia Plascencia [address]  [phone]    Schedule an appointment as soon as possible for a visit       No Flanagan MD  [address] [place] [address]/ Ronni [address] [place] [address],[address]    Schedule an appointment as soon as possible for a visit         DISCHARGE MEDICATIONS:  Discharge Medication List as of 8/15/2021  8:42 PM          DISCONTINUED MEDICATIONS:  Discharge Medication List as of 8/15/2021  8:42 PM (Please note that portions of this note were completed with a voice recognition program.  Efforts were made to edit the dictations but occasionally words are mis-transcribed.)    MARION Mahmood CNP (electronically signed)           MARION Mahmood CNP  08/15/21 1915

## 2021-08-16 LAB
EKG ATRIAL RATE: 82 BPM
EKG DIAGNOSIS: NORMAL
EKG P AXIS: 59 DEGREES
EKG P-R INTERVAL: 182 MS
EKG Q-T INTERVAL: 362 MS
EKG QRS DURATION: 94 MS
EKG QTC CALCULATION (BAZETT): 422 MS
EKG R AXIS: 64 DEGREES
EKG T AXIS: 21 DEGREES
EKG VENTRICULAR RATE: 82 BPM

## 2021-08-16 PROCEDURE — 93010 ELECTROCARDIOGRAM REPORT: CPT | Performed by: INTERNAL MEDICINE

## 2021-08-16 NOTE — ED PROVIDER NOTES
This patient was seen by the Mid-Level Provider. I have seen and examined the patient, agree with the workup, evaluation, management and diagnosis. Care plan has been discussed. My assessment reveals a 27-year-old male who presents with some weakness. This is a 27-year-old male with history of developmental delay and seizures who presents after a possible episode of near syncope versus seizure several days ago. The patient states he was walking on a sidewalk when he apparently went to the ground and was helped up by another individual.  In addition, reading the encounter notes there is also noted that a relative thought he was having a hard time with his balance recently. Patient denies any other complaints. Radiology results:    CT HEAD WO CONTRAST   Final Result   1. No acute hemorrhage or midline shift. 2. Other findings as described. XR CHEST PORTABLE   Final Result   Questionable lingular consolidation. LABS:    Labs Reviewed   CBC WITH AUTO DIFFERENTIAL    Narrative:     Performed at:  [facility] [address] [facility]  [employer], [place]   Phone [phone]   COMPREHENSIVE METABOLIC PANEL    Narrative:     Performed at:  [facility] [address],  [employer], [place]   Phone [phone]   TROPONIN    Narrative:     Performed at:  [facility] [address],  [place], [place] [employer]   Phone [phone]           EKG:    Sinus rhythm at a rate of 82 beats a minute with no acute ST elevations or depressions or pathologic Q waves. Normal axis. Exam:    Well-nourished male in no acute distress. He was neurologically intact with no focal motor or sensory deficits throughout. Medical decision makin-year-old male with a history of developmental delay and seizures presents after what appears to be a near syncopal episode several days ago.   His work-up today was unremarkable normal including a CT that shows some motion artifact but nothing significant. Medical work-up was also obtained and was unremarkable normal.  The patient was discharged back and to his family care with instructions to return if worse. FINAL IMPRESSION:    1.  Fatigue, unspecified type           Lilly Aparicio MD  08/15/21 [id number]

## 2021-08-18 DIAGNOSIS — G40.209 COMPLEX PARTIAL EPILEPSY WITH GENERALIZATION AND WITH NONINTRACTABLE EPILEPSY (HCC): Chronic | ICD-10-CM

## 2021-08-18 RX ORDER — LEVETIRACETAM 500 MG/1
TABLET ORAL
Qty: 180 TABLET | Refills: 1 | Status: SHIPPED | OUTPATIENT
Start: 2021-08-18 | End: 2022-01-31

## 2021-08-24 ENCOUNTER — OFFICE VISIT (OUTPATIENT)
Dept: NEUROLOGY | Age: 26
End: 2021-08-24
Payer: COMMERCIAL

## 2021-08-24 VITALS
DIASTOLIC BLOOD PRESSURE: 83 MMHG | SYSTOLIC BLOOD PRESSURE: 132 MMHG | BODY MASS INDEX: 31.23 KG/M2 | HEIGHT: 67 IN | HEART RATE: 101 BPM | WEIGHT: 199 LBS

## 2021-08-24 DIAGNOSIS — R62.50 DEVELOPMENTAL DELAY: ICD-10-CM

## 2021-08-24 DIAGNOSIS — G40.209 COMPLEX PARTIAL EPILEPSY WITH GENERALIZATION AND WITH NONINTRACTABLE EPILEPSY (HCC): Primary | ICD-10-CM

## 2021-08-24 PROCEDURE — G8427 DOCREV CUR MEDS BY ELIG CLIN: HCPCS | Performed by: PSYCHIATRY & NEUROLOGY

## 2021-08-24 PROCEDURE — 99214 OFFICE O/P EST MOD 30 MIN: CPT | Performed by: PSYCHIATRY & NEUROLOGY

## 2021-08-24 PROCEDURE — 1036F TOBACCO NON-USER: CPT | Performed by: PSYCHIATRY & NEUROLOGY

## 2021-08-24 PROCEDURE — G8417 CALC BMI ABV UP PARAM F/U: HCPCS | Performed by: PSYCHIATRY & NEUROLOGY

## 2021-08-24 RX ORDER — LAMOTRIGINE 25 MG/1
TABLET ORAL
Qty: 300 TABLET | Refills: 5 | Status: SHIPPED | OUTPATIENT
Start: 2021-08-24 | End: 2022-02-24

## 2021-08-24 NOTE — PROGRESS NOTES
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 Seizures began in infancy. Was on Depakote as a sole agent. Then on Lamictal.  Then on Lamictal and Keppra. Seizures are usually just staring episodes where he is not in contact with his surroundings and has some head movements.  Developmental delay 1996    Due to difficult birth. 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     Family History   Problem Relation Age of Onset    Hypertension Mother     No Known Problems Father     Asthma Sister     Diabetes Brother     Arthritis Maternal Grandmother     Hypertension Maternal Grandmother     High Cholesterol Maternal Grandmother     Hypertension Maternal Grandfather      Social History     Socioeconomic History    Marital status: Single     Spouse name: Not on file    Number of children: Not on file    Years of education: Not on file    Highest education level: Not on file   Occupational History    Occupation: [employer]     Employer: [employer]     Comment: alej[employer] garc[employer]    Occupation: unemployed   Tobacco Use    Smoking status: Never Smoker    Smokeless tobacco: Never Used   Vaping Use    Vaping Use: Never used   Substance and Sexual Activity    Alcohol use: No     Comment: never drinks alcohol    Drug use: No    Sexual activity: Not Currently     Partners: Female   Other Topics Concern    Not on file   Social History Narrative    No exercise. 8/4/21. Social Determinants of Health     Financial Resource Strain:     Difficulty of Paying Living Expenses:    Food Insecurity:     Worried About Running Out of Food in the Last Year:     [address] in the Last Year:    Transportation Needs: No Transportation Needs    Lack of Transportation (Medical): No    Lack of Transportation (Non-Medical):  No Physical Activity:     Days of Exercise per Week:     Minutes of Exercise per Session:    Stress:     Feeling of Stress :    Social Connections:     Frequency of Communication with Friends and Family:     Frequency of Social Gatherings with Friends and Family:     Attends Ep[employer] Services:     Active Member of Clubs or Organizations:     Attends Club or Organization Meetings:     Marital Status:    Intimate Partner Violence:     Fear of Current or Ex-Partner:     Emotionally Abused:     Physically Abused:     Sexually Abused:         Objective:  Exam:  /83   Pulse 101   Ht 5' 7\" (1.702 m)   Wt 199 lb (90.3 kg)   BMI 31.17 kg/m²   This is a well-nourished patient in no acute distress  Patient is awake, alert and oriented x3. Speech is normal.  Pupils are equal round reacting to light. Extraocular movements intact. Face symmetrical. Tongue midline. Motor exam shows normal symmetrical strength. Deep tendon reflexes normal. Plantar reflexes downgoing. Sensory exam normal. Coordination normal. Gait normal. No carotid bruit. No neck stiffness.       Data :  LABS:  General Labs:    CBC:   Lab Results   Component Value Date    WBC 7.3 08/15/2021    RBC 5.10 08/15/2021    HGB 15.9 08/15/2021    HCT 46.6 08/15/2021    MCV 91.2 08/15/2021    MCH 31.2 08/15/2021    MCHC 34.2 08/15/2021    RDW 12.9 08/15/2021     08/15/2021    MPV 7.8 08/15/2021     BMP:    Lab Results   Component Value Date     08/15/2021    K 4.3 08/15/2021    K 3.8 04/07/2020     08/15/2021    CO2 29 08/15/2021    BUN 10 08/15/2021    LABALBU 4.5 08/15/2021    CREATININE 1.2 08/15/2021    CALCIUM 9.7 08/15/2021    GFRAA >60 08/15/2021    LABGLOM >60 08/15/2021    GLUCOSE 78 08/15/2021       Impression :  Partial complex seizures, usually well controlled as long as he takes his medication regularly  Developmental delay  EEG done at [facility] in 2011 was abnormal with bursts of spikes and polyspike in the bilateral frontal regions with diffuse spread  MRI brain done at [facility] in 2009 was normal  Last seizure was approximately an July 2020 and at that time he had missed his medicines. Patient recently had some new symptoms of blurred vision and balance difficulties but those seem to have resolved    Plan :  Discussed with patient and his mother  Continue same dose of Keppra as well as lamotrigine  Stressed the importance of taking medications regularly without fail and not missing doses  Prescriptions refilled  I will get a Keppra and lamotrigine level just to make sure that they are not in the toxic range  Return in 6 months      Please note a portion of  this chart was generated using dragon dictation software. Although every effort was made to ensure the accuracy of this automated transcription, some errors in transcription may have occurred.

## 2021-08-25 ENCOUNTER — HOSPITAL ENCOUNTER (OUTPATIENT)
Age: 26
Discharge: HOME | End: 2021-08-25
Payer: COMMERCIAL

## 2021-08-25 DIAGNOSIS — G40.209 COMPLEX PARTIAL EPILEPSY WITH GENERALIZATION AND WITH NONINTRACTABLE EPILEPSY (HCC): ICD-10-CM

## 2021-08-25 LAB
KEPPRA DOSE AMT: ABNORMAL
KEPPRA: 5 UG/ML (ref 6–46)

## 2021-08-25 PROCEDURE — 80177 DRUG SCRN QUAN LEVETIRACETAM: CPT

## 2021-08-25 PROCEDURE — 80175 DRUG SCREEN QUAN LAMOTRIGINE: CPT

## 2021-08-27 LAB — LAMOTRIGINE LEVEL: 14.5 UG/ML (ref 2.5–15)

## 2022-01-31 DIAGNOSIS — G40.209 COMPLEX PARTIAL EPILEPSY WITH GENERALIZATION AND WITH NONINTRACTABLE EPILEPSY (HCC): Chronic | ICD-10-CM

## 2022-01-31 RX ORDER — LEVETIRACETAM 500 MG/1
TABLET ORAL
Qty: 180 TABLET | Refills: 0 | Status: SHIPPED | OUTPATIENT
Start: 2022-01-31 | End: 2022-03-07 | Stop reason: SDUPTHER

## 2022-02-23 DIAGNOSIS — G40.209 COMPLEX PARTIAL EPILEPSY WITH GENERALIZATION AND WITH NONINTRACTABLE EPILEPSY (HCC): ICD-10-CM

## 2022-02-24 RX ORDER — LAMOTRIGINE 25 MG/1
TABLET ORAL
Qty: 300 TABLET | Refills: 0 | Status: SHIPPED | OUTPATIENT
Start: 2022-02-24 | End: 2022-03-07 | Stop reason: SDUPTHER

## 2022-03-07 ENCOUNTER — OFFICE VISIT (OUTPATIENT)
Dept: NEUROLOGY | Age: 27
End: 2022-03-07
Payer: COMMERCIAL

## 2022-03-07 VITALS
SYSTOLIC BLOOD PRESSURE: 137 MMHG | HEART RATE: 72 BPM | WEIGHT: 208 LBS | BODY MASS INDEX: 32.58 KG/M2 | DIASTOLIC BLOOD PRESSURE: 90 MMHG

## 2022-03-07 DIAGNOSIS — R62.50 DEVELOPMENTAL DELAY: ICD-10-CM

## 2022-03-07 DIAGNOSIS — G40.209 COMPLEX PARTIAL EPILEPSY WITH GENERALIZATION AND WITH NONINTRACTABLE EPILEPSY (HCC): Primary | ICD-10-CM

## 2022-03-07 PROCEDURE — G8427 DOCREV CUR MEDS BY ELIG CLIN: HCPCS | Performed by: PSYCHIATRY & NEUROLOGY

## 2022-03-07 PROCEDURE — G8484 FLU IMMUNIZE NO ADMIN: HCPCS | Performed by: PSYCHIATRY & NEUROLOGY

## 2022-03-07 PROCEDURE — G8417 CALC BMI ABV UP PARAM F/U: HCPCS | Performed by: PSYCHIATRY & NEUROLOGY

## 2022-03-07 PROCEDURE — 99213 OFFICE O/P EST LOW 20 MIN: CPT | Performed by: PSYCHIATRY & NEUROLOGY

## 2022-03-07 PROCEDURE — 1036F TOBACCO NON-USER: CPT | Performed by: PSYCHIATRY & NEUROLOGY

## 2022-03-07 RX ORDER — LEVETIRACETAM 500 MG/1
TABLET ORAL
Qty: 180 TABLET | Refills: 1 | Status: SHIPPED | OUTPATIENT
Start: 2022-03-07 | End: 2022-09-12 | Stop reason: SDUPTHER

## 2022-03-07 RX ORDER — LAMOTRIGINE 25 MG/1
TABLET ORAL
Qty: 900 TABLET | Refills: 1 | Status: SHIPPED | OUTPATIENT
Start: 2022-03-07 | End: 2022-09-12 | Stop reason: SDUPTHER

## 2022-03-07 NOTE — PROGRESS NOTES
Gil Reina   Neurology followup    Subjective:   CC/HP  History was obtained from the patient. Patient states that he is doing well. He denies any new neurological symptoms. His last seizure was over a year ago. He has not had any recent seizures. He takes his medications regularly. Detailed history:  Patient has had seizures ever since was a infant. She was initially seen at [facility] for epilepsy. Patient had been on several medications. Patient has a history of developmental delay. Patient is and was seen by Dr. Rich Connors but now because of insurance reasons he could not see them anymore. Patient started missing medications since he was not seeing his neurologist and would have breakthrough seizures. Patient was referred here for further evaluation. The last seizure was a couple of months ago. Seizure description: Patient would get a strange look on his face and then have trembling or jerking of his arms and legs. She sometimes with a tongue or lip biting. He had other times would have bladder or bowel incontinence.   He would be postictal and then come back to normal.    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 Seizures began in infancy. Was on Depakote as a sole agent. Then on Lamictal.  Then on Lamictal and Keppra. Seizures are usually just staring episodes where he is not in contact with his surroundings and has some head movements.  Developmental delay 1996    Due to difficult birth. 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     Family History   Problem Relation Age of Onset    Hypertension Mother     No Known Problems Father     Asthma Sister     Diabetes Brother     Arthritis Maternal Grandmother     Hypertension Maternal Grandmother     High Cholesterol Maternal Grandmother     Hypertension Maternal Grandfather      Social History     Socioeconomic History    Marital status: Single     Spouse name: Not on file    Number of children: Not on file    Years of education: Not on file    Highest education level: Not on file   Occupational History    Occupation: [employer]     Employer: [employer]     Comment: alejandro garcía    Occupation: unemployed   Tobacco Use    Smoking status: Never Smoker    Smokeless tobacco: Never Used   Vaping Use    Vaping Use: Never used   Substance and Sexual Activity    Alcohol use: No     Comment: never drinks alcohol    Drug use: No    Sexual activity: Not Currently     Partners: Female   Other Topics Concern    Not on file   Social History Narrative    No exercise. 5/8/2020. Walking a lot now. 8/4/21. Social Determinants of Health     Financial Resource Strain:     Difficulty of Paying Living Expenses: Not on file   Food Insecurity:     Worried About Running Out of Food in the Last Year: Not on file    Mindy of Food in the Last Year: Not on file   Transportation Needs: No Transportation Needs    Lack of Transportation (Medical): No    Lack of Transportation (Non-Medical):  No   Physical Activity:     Days of Exercise per Week: Not on file    Minutes of Exercise per Session: Not on file   Stress:     Feeling of Stress : Not on file   Social Connections:     Frequency of Communication with Friends and Family: Not on file    Frequency of Social Gatherings with Friends and Family: Not on file    Attends Yarsani Services: Not on file    Active Member of [address] [place] [address] [employer] or Organizations: Not on file    Attends Club or Organization Meetings: Not on file    Marital Status: Not on file   Intimate Partner Violence:     Fear of Current or Ex-Partner: Not on file    Emotionally Abused: Not on file    Physically Abused: Not on file    Sexually Abused: Not on file   Housing Stability:     Unable to Pay for Housing in the Last Year: Not on file    Number of [place] in the Last Year: Not on file    Unstable Housing in the Last Year: Not on file        Objective:  Exam:  BP (!) 137/90   Pulse 72   Wt 208 lb (94.3 kg)   BMI 32.58 kg/m²   This is a well-nourished patient in no acute distress  Patient is awake, alert and oriented x3. Speech is normal.  Pupils are equal round reacting to light. Extraocular movements intact. Face symmetrical. Tongue midline. Motor exam shows normal symmetrical strength. Deep tendon reflexes normal. Plantar reflexes downgoing. Sensory exam normal. Coordination normal. Gait normal. No carotid bruit. No neck stiffness.       Data :  LABS:  General Labs:    CBC:   Lab Results   Component Value Date    WBC 7.3 08/15/2021    RBC 5.10 08/15/2021    HGB 15.9 08/15/2021    HCT 46.6 08/15/2021    MCV 91.2 08/15/2021    MCH 31.2 08/15/2021    MCHC 34.2 08/15/2021    RDW 12.9 08/15/2021     08/15/2021    MPV 7.8 08/15/2021     BMP:    Lab Results   Component Value Date     08/15/2021    K 4.3 08/15/2021    K 3.8 04/07/2020     08/15/2021    CO2 29 08/15/2021    BUN 10 08/15/2021    LABALBU 4.5 08/15/2021    CREATININE 1.2 08/15/2021    CALCIUM 9.7 08/15/2021    GFRAA >60 08/15/2021    LABGLOM >60 08/15/2021    GLUCOSE 78 08/15/2021       Impression :  Partial complex seizures, usually well controlled as long as he takes his medication regularly  Developmental delay  EEG done at [facility] in 2011 was abnormal with bursts of spikes and polyspike in the bilateral frontal regions with diffuse spread  MRI brain done at [facility] in 2009 was normal  Last seizure was approximately an July 2020 and at that time he had missed his medicines. Plan :  Discussed with patient and his mother  Continue same dose of Keppra as well as lamotrigine  Stressed the importance of taking medications regularly without fail and not missing doses  Prescriptions refilled  I will repeat his Keppra and lamotrigine levels. Return in 6 months      Please note a portion of  this chart was generated using dragon dictation software. Although every effort was made to ensure the accuracy of this automated transcription, some errors in transcription may have occurred.

## 2022-03-10 ENCOUNTER — HOSPITAL ENCOUNTER (OUTPATIENT)
Age: 27
Discharge: HOME | End: 2022-03-10
Payer: COMMERCIAL

## 2022-03-10 DIAGNOSIS — G40.209 COMPLEX PARTIAL EPILEPSY WITH GENERALIZATION AND WITH NONINTRACTABLE EPILEPSY (HCC): ICD-10-CM

## 2022-03-10 LAB
KEPPRA DOSE AMT: ABNORMAL
KEPPRA: 4.5 UG/ML (ref 6–46)

## 2022-03-10 PROCEDURE — 80175 DRUG SCREEN QUAN LAMOTRIGINE: CPT

## 2022-03-10 PROCEDURE — 80177 DRUG SCRN QUAN LEVETIRACETAM: CPT

## 2022-03-14 LAB — LAMOTRIGINE LEVEL: 12.9 UG/ML (ref 3–15)

## 2022-09-12 ENCOUNTER — OFFICE VISIT (OUTPATIENT)
Dept: NEUROLOGY | Age: 27
End: 2022-09-12
Payer: COMMERCIAL

## 2022-09-12 VITALS
DIASTOLIC BLOOD PRESSURE: 76 MMHG | BODY MASS INDEX: 30.76 KG/M2 | HEART RATE: 71 BPM | HEIGHT: 67 IN | WEIGHT: 196 LBS | SYSTOLIC BLOOD PRESSURE: 120 MMHG

## 2022-09-12 DIAGNOSIS — G40.209 COMPLEX PARTIAL EPILEPSY WITH GENERALIZATION AND WITH NONINTRACTABLE EPILEPSY (HCC): Primary | ICD-10-CM

## 2022-09-12 DIAGNOSIS — R62.50 DEVELOPMENTAL DELAY: ICD-10-CM

## 2022-09-12 PROCEDURE — 99213 OFFICE O/P EST LOW 20 MIN: CPT | Performed by: PSYCHIATRY & NEUROLOGY

## 2022-09-12 PROCEDURE — 1036F TOBACCO NON-USER: CPT | Performed by: PSYCHIATRY & NEUROLOGY

## 2022-09-12 PROCEDURE — G8417 CALC BMI ABV UP PARAM F/U: HCPCS | Performed by: PSYCHIATRY & NEUROLOGY

## 2022-09-12 PROCEDURE — G8427 DOCREV CUR MEDS BY ELIG CLIN: HCPCS | Performed by: PSYCHIATRY & NEUROLOGY

## 2022-09-12 RX ORDER — LAMOTRIGINE 25 MG/1
TABLET ORAL
Qty: 900 TABLET | Refills: 1 | Status: SHIPPED | OUTPATIENT
Start: 2022-09-12

## 2022-09-12 RX ORDER — LEVETIRACETAM 500 MG/1
TABLET ORAL
Qty: 180 TABLET | Refills: 1 | Status: SHIPPED | OUTPATIENT
Start: 2022-09-12

## 2022-09-12 NOTE — PROGRESS NOTES
Fely Monae   Neurology followup    Subjective:   CC/HP  History was obtained from the patient. Patient states that he is doing well. He denies any new neurological symptoms. His last seizure was about 18 months ago. He has not had any recent seizures. He takes his medications regularly. Detailed history:  Patient has had seizures ever since was a infant. She was initially seen at [facility] for epilepsy. Patient had been on several medications. Patient has a history of developmental delay. Patient is and was seen by Dr. Michael Chase but now because of insurance reasons he could not see them anymore. Patient started missing medications since he was not seeing his neurologist and would have breakthrough seizures. Patient was referred here for further evaluation. The last seizure was a couple of months ago. Seizure description: Patient would get a strange look on his face and then have trembling or jerking of his arms and legs. She sometimes with a tongue or lip biting. He had other times would have bladder or bowel incontinence.   He would be postictal and then come back to normal.    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 Seizures began in infancy. Was on Depakote as a sole agent. Then on Lamictal.  Then on Lamictal and Keppra. Seizures are usually just staring episodes where he is not in contact with his surroundings and has some head movements. Developmental delay 1996    Due to difficult birth. 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     Family History   Problem Relation Age of Onset    Hypertension Mother     No Known Problems Father     Asthma Sister     Diabetes Brother     Arthritis Maternal Grandmother     Hypertension Maternal Grandmother     High Cholesterol Maternal Grandmother     Hypertension Maternal Grandfather      Social History     Socioeconomic History    Marital status: Single     Spouse name: None    Number of children: None    Years of education: None    Highest education level: None   Occupational History    Occupation: [employer]     Employer: [employer]     Comment: alejandro [employer]arc[employer]    Occupation: unemployed   Tobacco Use    Smoking status: Never    Smokeless tobacco: Never   Vaping Use    Vaping Use: Never used   Substance and Sexual Activity    Alcohol use: No     Comment: never drinks alcohol    Drug use: No    Sexual activity: Not Currently     Partners: Female   Social History Narrative    No exercise. 5/8/2020. Walking a lot now. 8/4/21. Objective:  Exam:  /76   Pulse 71   Ht 5' 7\" (1.702 m)   Wt 196 lb (88.9 kg)   BMI 30.70 kg/m²   This is a well-nourished patient in no acute distress  Patient is awake, alert and oriented x3. Speech is normal.  Pupils are equal round reacting to light. Extraocular movements intact. Face symmetrical. Tongue midline. Motor exam shows normal symmetrical strength. Deep tendon reflexes normal. Plantar reflexes downgoing. Sensory exam normal. Coordination normal. Gait normal. No carotid bruit. No neck stiffness.       Data :  LABS:  General Labs:    CBC:   Lab Results   Component Value Date/Time    WBC 7.3 08/15/2021 07:04 PM    RBC 5.10 08/15/2021 07:04 PM    HGB 15.9 08/15/2021 07:04 PM    HCT 46.6 08/15/2021 07:04 PM    MCV 91.2 08/15/2021 07:04 PM    MCH 31.2 08/15/2021 07:04 PM    MCHC 34.2 08/15/2021 07:04 PM    RDW 12.9 08/15/2021 07:04 PM     08/15/2021 07:04 PM    MPV 7.8 08/15/2021 07:04 PM     BMP:    Lab Results   Component Value Date/Time     08/15/2021 07:04 PM    K 4.3 08/15/2021 07:04 PM    K 3.8 04/07/2020 01:13 PM     08/15/2021 07:04 PM    CO2 29 08/15/2021 07:04 PM    BUN 10 08/15/2021 07:04 PM    LABALBU 4.5 08/15/2021 07:04 PM    CREATININE 1.2 08/15/2021 07:04 PM    CALCIUM 9.7 08/15/2021 07:04 PM    GFRAA >60 08/15/2021 07:04 PM    LABGLOM >60 08/15/2021 07:04 PM    GLUCOSE 78 08/15/2021 07:04 PM       Impression :  Partial complex seizures, usually well controlled as long as he takes his medication regularly  Developmental delay  EEG done at [facility] in 2011 was abnormal with bursts of spikes and polyspike in the bilateral frontal regions with diffuse spread  MRI brain done at [facility] in 2009 was normal  Last seizure was approximately an July 2020 and at that time he had missed his medicines. Plan :  Discussed with patient and his mother  Continue same dose of Keppra as well as lamotrigine  Stressed the importance of taking medications regularly without fail and not missing doses  Prescriptions refilled  Keppra level was borderline low but lamotrigine level was normal.  Return in 6 months      Please note a portion of  this chart was generated using dragon dictation software. Although every effort was made to ensure the accuracy of this automated transcription, some errors in transcription may have occurred.

## 2022-09-28 ENCOUNTER — OFFICE VISIT (OUTPATIENT)
Dept: FAMILY MEDICINE CLINIC | Age: 27
End: 2022-09-28
Payer: COMMERCIAL

## 2022-09-28 VITALS
WEIGHT: 193.2 LBS | SYSTOLIC BLOOD PRESSURE: 120 MMHG | HEART RATE: 65 BPM | HEIGHT: 67 IN | BODY MASS INDEX: 30.32 KG/M2 | OXYGEN SATURATION: 98 % | DIASTOLIC BLOOD PRESSURE: 80 MMHG

## 2022-09-28 DIAGNOSIS — Z23 NEED FOR PROPHYLACTIC VACCINATION AGAINST DIPHTHERIA-TETANUS-PERTUSSIS (DTP): ICD-10-CM

## 2022-09-28 DIAGNOSIS — Z23 NEED FOR PROPHYLACTIC VACCINATION WITH TETANUS-DIPHTHERIA (TD): ICD-10-CM

## 2022-09-28 DIAGNOSIS — E78.00 HYPERCHOLESTEROLEMIA: ICD-10-CM

## 2022-09-28 DIAGNOSIS — Z51.81 MEDICATION MONITORING ENCOUNTER: ICD-10-CM

## 2022-09-28 DIAGNOSIS — Z00.00 ENCOUNTER FOR WELL ADULT EXAM WITHOUT ABNORMAL FINDINGS: Primary | ICD-10-CM

## 2022-09-28 DIAGNOSIS — G40.209 COMPLEX PARTIAL EPILEPSY WITH GENERALIZATION AND WITH NONINTRACTABLE EPILEPSY (HCC): ICD-10-CM

## 2022-09-28 DIAGNOSIS — Z23 NEEDS FLU SHOT: ICD-10-CM

## 2022-09-28 PROCEDURE — 1036F TOBACCO NON-USER: CPT | Performed by: FAMILY MEDICINE

## 2022-09-28 PROCEDURE — 99395 PREV VISIT EST AGE 18-39: CPT | Performed by: FAMILY MEDICINE

## 2022-09-28 PROCEDURE — G8417 CALC BMI ABV UP PARAM F/U: HCPCS | Performed by: FAMILY MEDICINE

## 2022-09-28 PROCEDURE — G8427 DOCREV CUR MEDS BY ELIG CLIN: HCPCS | Performed by: FAMILY MEDICINE

## 2022-09-28 SDOH — ECONOMIC STABILITY: FOOD INSECURITY: WITHIN THE PAST 12 MONTHS, THE FOOD YOU BOUGHT JUST DIDN'T LAST AND YOU DIDN'T HAVE MONEY TO GET MORE.: NEVER TRUE

## 2022-09-28 SDOH — ECONOMIC STABILITY: FOOD INSECURITY: WITHIN THE PAST 12 MONTHS, YOU WORRIED THAT YOUR FOOD WOULD RUN OUT BEFORE YOU GOT MONEY TO BUY MORE.: NEVER TRUE

## 2022-09-28 ASSESSMENT — SOCIAL DETERMINANTS OF HEALTH (SDOH): HOW HARD IS IT FOR YOU TO PAY FOR THE VERY BASICS LIKE FOOD, HOUSING, MEDICAL CARE, AND HEATING?: NOT HARD AT ALL

## 2022-09-28 ASSESSMENT — PATIENT HEALTH QUESTIONNAIRE - PHQ9
SUM OF ALL RESPONSES TO PHQ QUESTIONS 1-9: 0
SUM OF ALL RESPONSES TO PHQ9 QUESTIONS 1 & 2: 0
2. FEELING DOWN, DEPRESSED OR HOPELESS: 0
SUM OF ALL RESPONSES TO PHQ QUESTIONS 1-9: 0
1. LITTLE INTEREST OR PLEASURE IN DOING THINGS: 0

## 2022-09-28 NOTE — PATIENT INSTRUCTIONS
Candida Powell was seen today for annual exam.    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 Per Dr Weston Callow. Hypercholesterolemia  -     Lipid Panel; Standing fasting blood work in 1 week. You can lower your LDL cholesterol by decreasing your saturated fats, eating fewer egg yolks and using egg substitutes, eating smaller portions of red meat and using more skinless poultry and fish to meet your protein needs. Greek yogurt and low-fat cottage cheese are good sources of protein which are low in cholesterol. Try to get some of your proteins from plant sources such as beans, nuts, peas and / or soy products such as tofu. Adding fruits and vegetables to your diet (a total of 5 servings or more between the two) can help also. They help to fill you up so you eat less of the cholesterol raising foods and the fiber lowers cholesterol. Needs flu shot  -     Influenza, FLUCELVAX, (age 10 mo+), IM, Multi-Dose Vial, 0.5 mL    Need for prophylactic vaccination with tetanus-diphtheria (Td)  -     Tdap (age 6y and older) IM (Boostrix)    Medication monitoring encounter  -     Hepatic Function Panel; Standing    Well Visit, Ages 25 to 72: Care Instructions  Well visits can help you stay healthy. Your doctor has checked your overall health and may have suggested ways to take good care of yourself. Your doctor also may have recommended tests. You can help prevent illness with healthy eating, good sleep, vaccinations, regular exercise, and other steps.   Get the tests that you and your doctor decide on. Depending on your age and risks, examples might include screening for diabetes; hepatitis C; HIV; and cervical, breast, lung, and colon cancer. Screening helps find diseases before any symptoms appear. Eat healthy foods. Choose fruits, vegetables, whole grains, lean protein, and low-fat dairy foods. Limit saturated fat and reduce salt. Limit alcohol. Men should have no more than 2 drinks a day. Women should have no more than 1. For some people, no alcohol is the best choice. Exercise. Get at least 30 minutes of exercise on most days of the week. Walking can be a good choice. Reach and stay at your healthy weight. This will lower your risk for many health problems. Take care of your mental health. Try to stay connected with friends, family, and community, and find ways to manage stress. If you're feeling depressed or hopeless, talk to someone. A counselor can help. If you don't have a counselor, talk to your doctor. Talk to your doctor if you think you may have a problem with alcohol or drug use. This includes prescription medicines and illegal drugs. Avoid tobacco and nicotine: Don't smoke, vape, or chew. If you need help quitting, talk to your doctor. Practice safer sex. Getting tested, using condoms or dental dams, and limiting sex partners can help prevent STIs. Use birth control if it's important to you to prevent pregnancy. Talk with your doctor about your choices and what might be best for you. Prevent problems where you can. Protect your skin from too much sun, wash your hands, brush your teeth twice a day, and wear a seat belt in the car. Where can you learn more? Go to https://eneida.[email or web address]. org and sign in to your [employer] account. Enter P072 in the [facility] box to learn more about \"Well Visit, Ages 25 to 72: Care Instructions. \"     If you do not have an account, please click on the \"Sign Up Now\" link.   Current as of: March 9, 2022 Content Version: 13.4  © 4417-0552 Healthwise, [employer]. Care instructions adapted under license by [facility] [place] ([facility]). If you have questions about a medical condition or this instruction, always ask your healthcare professional. [address] any warranty or liability for your use of this information. A Healthy Lifestyle: Care Instructions  A healthy lifestyle can help you feel good, have more energy, and stay at a weight that's healthy for you. You can share a healthy lifestyle with your friends and family. And you can do it on your own. Eat meals with your friends or family. You could try cooking together. Plan activities with other people. Go for a walk with a friend, try a free online fitness class, or join a sports league. Eat a variety of healthy foods. These include fruits, vegetables, whole grains, low-fat dairy, and lean protein. Choose healthy portions of food. You can use the Nutrition Facts label on food packages as a guide. Eat more fruits and vegetables. You could add vegetables to sandwiches or add fruit to cereal.  Drink water when you are thirsty. Limit soda, juice, and sports drinks. Try to exercise most days. Aim for at least 2½ hours of exercise each week. Keep moving. Work in the garden or take your dog on a walk. Use the stairs instead of the elevator. If you use tobacco or nicotine, try to quit. Ask your doctor about programs and medicines to help you quit. Limit alcohol. Men should have no more than 2 drinks a day. Women should have no more than 1. For some people, no alcohol is the best choice. Follow-up care is a key part of your treatment and safety. Be sure to make and go to all appointments, and call your doctor if you are having problems. It's also a good idea to know your test results and keep a list of the medicines you take. Where can you learn more? Go to [email or web address]://eneida.[email or web address] org and sign in to your [email or web address] account.  Enter R093 in the [facility] box to learn more about \"A Healthy Lifestyle: Care Instructions. \"     If you do not have an account, please click on the \"Sign Up Now\" link. Current as of: March 9, 2022               Content Version: 13.4  © 2006-2022 Healthwise, Incorporated. Care instructions adapted under license by [facility] ([facility]). If you have questions about a medical condition or this instruction, always ask your healthcare professional. [address] any warranty or liability for your use of this information. Heart-Healthy Diet   Sodium, Fat, and Cholesterol Controlled Diet       What Is a Heart Healthy Diet? A heart-healthy diet is one that limits sodium , certain types of fat , and cholesterol . This type of diet is recommended for:   People with any form of cardiovascular disease (eg, coronary heart disease , peripheral vascular disease , previous heart attack , previous stroke )   People with risk factors for cardiovascular disease, such as high blood pressure , high cholesterol , or diabetes   Anyone who wants to lower their risk of developing cardiovascular disease   Sodium    Sodium is a mineral found in many foods. In general, most people consume much more sodium than they need. Diets high in sodium can increase blood pressure and lead to edema (water retention). On a heart-healthy diet, you should consume no more than 2,300 mg (milligrams) of sodium per dayabout the amount in one teaspoon of table salt. The foods highest in sodium include table salt (about 50% sodium), processed foods, convenience foods, and preserved foods. Cholesterol    Cholesterol is a fat-like, waxy substance in your blood. Our bodies make some cholesterol. It is also found in animal products, with the highest amounts in fatty meat, egg yolks, whole milk, cheese, shellfish, and organ meats. On a heart-healthy diet, you should limit your cholesterol intake to less than 200 mg per day.    It is normal and important to have some cholesterol in your bloodstream. But too much cholesterol can cause plaque to build up within your arteries, which can eventually lead to a heart attack or stroke. The two types of cholesterol that are most commonly referred to are:   Low-density lipoprotein (LDL) cholesterol  Also known as bad cholesterol, this is the cholesterol that tends to build up along your arteries. Bad cholesterol levels are increased by eating fats that are saturated or hydrogenated. Optimal level of this cholesterol is less than 100. Over 130 starts to get risky for heart disease. High-density lipoprotein (HDL) cholesterol  Also known as good cholesterol, this type of cholesterol actually carries cholesterol away from your arteries and may, therefore, help lower your risk of having a heart attack. You want this level to be high (ideally greater than 60). It is a risk to have a level less than 40. You can raise this good cholesterol by eating olive oil, canola oil, avocados, or nuts. Exercise raises this level, too. Fat    Fat is calorie dense and packs a lot of calories into a small amount of food. Even though fats should be limited due to their high calorie content, not all fats are bad. In fact, some fats are quite healthful. Fat can be broken down into four main types.    The good-for-you fats are:   Monounsaturated fat  found in oils such as olive and canola, avocados, and nuts and natural nut butters; can decrease cholesterol levels, while keeping levels of HDL cholesterol high   Polyunsaturated fat  found in oils such as safflower, sunflower, soybean, corn, and sesame; can decrease total cholesterol and LDL cholesterol   Omega-3 fatty acids  particularly those found in fatty fish (such as salmon, trout, tuna, mackerel, herring, and sardines); can decrease risk of arrhythmias, decrease triglyceride levels, and slightly lower blood pressure   The fats that you want to limit are:   Saturated fat found in animal products, many fast foods, and a few vegetables; increases total blood cholesterol, including LDL levels   Animal fats that are saturated include: butter, lard, whole-milk dairy products, meat fat, and poultry skin   Vegetable fats that are saturated include: hydrogenated shortening, palm oil, coconut oil, cocoa butter   Hydrogenated or trans fat  found in margarine and vegetable shortening, most shelf stable snack foods, and fried foods; increases LDL and decreases HDL     It is generally recommended that you limit your total fat for the day to less than 30% of your total calories. If you follow an 1800-calorie heart healthy diet, for example, this would mean 60 grams of fat or less per day. Saturated fat and trans fat in your diet raises your blood cholesterol the most, much more than dietary cholesterol does. For this reason, on a heart-healthy diet, less than 7% of your calories should come from saturated fat and ideally 0% from trans fat. On an 1800-calorie diet, this translates into less than 14 grams of saturated fat per day, leaving 46 grams of fat to come from mono- and polyunsaturated fats.    Food Choices on a Heart Healthy Diet   Food Category   Foods Recommended   Foods to Avoid   Grains   Breads and rolls without salted tops Most dry and cooked cereals Unsalted crackers and breadsticks Low-sodium or homemade breadcrumbs or stuffing All rice and pastas   Breads, rolls, and crackers with salted tops High-fat baked goods (eg, muffins, donuts, pastries) Quick breads, self-rising flour, and biscuit mixes Regular bread crumbs Instant hot cereals Commercially prepared rice, pasta, or stuffing mixes   Vegetables   Most fresh, frozen, and low-sodium canned vegetables Low-sodium and salt-free vegetable juices Canned vegetables if unsalted or rinsed   Regular canned vegetables and juices, including sauerkraut and pickled vegetables Frozen vegetables with sauces Commercially prepared potato and vegetable mixes   Fruits   Most fresh, frozen, and canned fruits All fruit juices   Fruits processed with salt or sodium   Milk   Nonfat or low-fat (1%) milk Nonfat or low-fat yogurt Cottage cheese, low-fat ricotta, cheeses labeled as low-fat and low-sodium   Whole milk Reduced-fat (2%) milk Malted and chocolate milk Full fat yogurt Most cheeses (unless low-fat and low salt) Buttermilk (no more than 1 cup per week)   Meats and Beans   Lean cuts of fresh or frozen beef, veal, lamb, or pork (look for the word loin) Fresh or frozen poultry without the skin Fresh or frozen fish and some shellfish Egg whites and egg substitutes (Limit whole eggs to three per week) Tofu Nuts or seeds (unsalted, dry-roasted), low-sodium peanut butter Dried peas, beans, and lentils   Any smoked, cured, salted, or canned meat, fish, or poultry (including hardin, chipped beef, cold cuts, hot dogs, sausages, sardines, and anchovies) Poultry skins Breaded and/or fried fish or meats Canned peas, beans, and lentils Salted nuts   Fats and Oils   Olive oil and canola oil Low-sodium, low-fat salad dressings and mayonnaise   Butter, margarine, coconut and palm oils, hardin fat   Snacks, Sweets, and Condiments   Low-sodium or unsalted versions of broths, soups, soy sauce, and condiments Pepper, herbs, and spices; vinegar, lemon, or lime juice Low-fat frozen desserts (yogurt, sherbet, fruit bars) Sugar, cocoa powder, honey, syrup, jam, and preserves Low-fat, trans-fat free cookies, cakes, and pies Edu and animal crackers, fig bars, zachariah snaps   High-fat desserts Broth, soups, gravies, and sauces, made from instant mixes or other high-sodium ingredients Salted snack foods Canned olives Meat tenderizers, seasoning salt, and most flavored vinegars   Beverages   Low-sodium carbonated beverages Tea and coffee in moderation Soy milk   Commercially softened water   Suggestions   Make whole grains, fruits, and vegetables the base of your diet.     Choose heart-healthy fats such as canola, olive, and flaxseed oil, and foods high in heart-healthy fats, such as nuts, seeds, soybeans, tofu, and fish. Eat fish at least twice per week; the fish highest in omega-3 fatty acids and lowest in mercury include salmon, herring, mackerel, sardines, and canned chunk light tuna. If you eat fish less than twice per week or have high triglycerides, talk to your doctor about taking fish oil supplements. Read food labels. For products low in fat and cholesterol, look for fat free, low-fat, cholesterol free, saturated fat free, and trans fat freeAlso scan the Nutrition Facts Label, which lists saturated fat, trans fat, and cholesterol amounts. For products low in sodium, look for sodium free, very low sodium, low sodium, no added salt, and unsalted   Skip the salt when cooking or at the table; if food needs more flavor, get creative and try out different herbs and spices. Garlic and onion also add substantial flavor to foods. Trim any visible fat off meat and poultry before cooking, and drain the fat off after fuentes. Use cooking methods that require little or no added fat, such as grilling, boiling, baking, poaching, broiling, roasting, steaming, stir-frying, and sauting. Avoid fast food and convenience food. They tend to be high in saturated and trans fat and have a lot of added salt. Talk to a registered dietitian for individualized diet advice. Last Reviewed: March 2011 Jose Paulino MS, MPH, RD   Updated: 3/29/2011     High-Fiber Diet     What Is Fiber? Dietary fiber is a form of carbohydrate found in plants that cannot be digested by humans. All plants contain fiber, including fruits, vegetables, grains, and legumes. Fiber is often classified into two categories: soluble and insoluble. Soluble fiber draws water into the bowel and can help slow digestion.  Examples of foods that are high in soluble fiber include oatmeal, oat bran, barley, legumes (eg, beans and peas), apples, and strawberries. Insoluble fiber speeds digestion and can add bulk to the stool. Examples of foods that are high in insoluble fiber include whole-wheat products, wheat bran, cauliflower, green beans, and potatoes. Why Follow a High-Fiber Diet? A high-fiber diet is often recommended to prevent and treat constipation , hemorrhoids , diverticulitis , and irritable bowel syndrome . Eating a high-fiber diet can also help improve your cholesterol levels, lower your risk of coronary heart disease , reduce your risk of type 2 diabetes , and lower your weight. For people with type 1 or 2 diabetes, a high-fiber diet can also help stabilize blood sugar levels. How Much Fiber Should I Eat? A high-fiber diet should contain  20-35 grams  of fiber a day. This is actually the amount recommended for the general adult population; however, most Americans eat only 15 grams of fiber per day. Digestion of Fiber   Eating a higher fiber diet than usual can take some getting used to by your body's digestive system. To avoid the side effects of sudden increases in dietary fiber (eg, gas, cramping, bloating, and diarrhea), increase fiber gradually and be sure to drink plenty of fluids every day. Tips for Increasing Fiber Intake   Whenever possible, choose whole grains over refined grains (eg, brown rice instead of white rice, whole-wheat bread instead of white bread). Include a variety of grains in your diet, such as wheat, rye, barley, oats, quinoa, and bulgur. Eat more vegetarian-based meals. Here are some ideas: black bean burgers, eggplant lasagna, and veggie tofu stir-mckeon. Choose high-fiber snacks, such as fruits, popcorn, whole-grain crackers, and nuts. Make whole-grain cereal or whole-grain toast part of your daily breakfast regime. When eating out, whether ordering a sandwich or dinner, ask for extra vegetables.     When baking, replace part of the white flour with whole-wheat flour. Whole-wheat flour is particularly easy to incorporate into a recipe. High-Fiber Diet Eating Guide   Food Category   Foods Recommended   Notes   Grains   Whole-grain breads, muffins, bagels, or louisa bread Rye bread Whole-wheat crackers or crisp breads Whole-grain or bran cereals Oatmeal, oat bran, or grits Wheat germ Whole-wheat pasta and brown rice   Read the ingredients list on food labels. Look for products that list \"whole\" as the first ingredient (eg, whole-wheat, whole oats). Choose cereals with at least 2 grams of fiber per serving. Vegetables   All vegetables, especially asparagus, bean sprouts, broccoli, Cutler sprouts, cabbage, carrots, cauliflower, celery, corn, greens, green beans, green pepper, onions, peas, potatoes (with skin), snow peas, spinach, squash, sweet potatoes, tomatoes, zucchini   For maximum fiber intake, eat the peels of fruits and vegetablesjust be sure to wash them well first.   Fruits   All fruits, especially apples, berries, grapefruits, mangoes, nectarines, oranges, peaches, pears, dried fruits (figs, dates, prunes, raisins)   Choose raw fruits and vegetables over juice, cooked, or cannedraw fruit has more fiber. Dried fruit is also a good source of fiber. Milk   With the exception of yogurt containing inulin (a type of fiber), dairy foods provide little fiber. Add more fiber by topping your yogurt or cottage cheese with fresh fruit, whole grain or bran cereals, nuts, or seeds. Meats and Beans   All beans and peas, especially Garbanzo beans, kidney beans, lentils, lima beans, split peas, and ramey beans All nuts and seeds, especially almonds, peanuts, [place] nuts, cashews, peanut butter, walnuts, sesame and sunflower seeds All meat, poultry, fish, and eggs   Increase fiber in meat dishes by adding ramey beans, kidney beans, black-eyed peas, bran, or oatmeal. If you are following a low-fat diet, use nuts and seeds only in moderation.    Fats and Oils   All in moderation   Fats and oils do not provide fiber   Snacks, Sweets, and Condiments   Fruit Nuts Popcorn, whole-wheat pretzels, or trail mix made with dried fruits, nuts, and seeds Cakes, breads, and cookies made with oatmeal or whole-wheat flour   Most snack foods do not provide much fiber. Choose snacks with at least 2 grams of fiber per serving.      Last Reviewed: March 2011 Gera Currie MS, MPH, RD   Updated: 3/29/2011

## 2022-09-28 NOTE — PROGRESS NOTES
200  Well Adult Note  Name: James Good Date: 2022   MRN: [id number] Sex: Male   Age: 23 y.o. Ethnicity: Non- / Non    : 1995 Race: Black / African American      Ha Nino is here for well adult exam.  History:    Seizures  None since  per pt. Taking medicine every day. Sleeps ok. Obesity  Pt is gradually losing weight thru walking. He is learning to drive. Lives with his grandmother. Review of Systems Review of systems in history of present illness or scanned in under media tab. No Known Allergies       Prior to Visit Medications    Medication Sig Taking? 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 Seizures began in infancy. Was on Depakote as a sole agent. Then on Lamictal.  Then on Lamictal and Keppra. Seizures are usually just staring episodes where he is not in contact with his surroundings and has some head movements. Developmental delay 1995    Due to difficult birth.     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       Past Surgical History:   Procedure Laterality Date    TONSILLECTOMY           Family History   Problem Relation Age of Onset    Hypertension Mother     No Known Problems Father     Asthma Sister     Diabetes Brother     Arthritis Maternal Grandmother Hypertension Maternal Grandmother     High Cholesterol Maternal Grandmother     Hypertension Maternal Grandfather        Social History     Tobacco Use    Smoking status: Never    Smokeless tobacco: Never   Vaping Use    Vaping Use: Never used   Substance Use Topics    Alcohol use: No     Comment: never drinks alcohol    Drug use: No       Objective   /80 (Site: Left Upper Arm, Position: Sitting, Cuff Size: Medium Adult)   Pulse 65   Ht 5' 7\" (1.702 m)   Wt 193 lb 3.2 oz (87.6 kg)   SpO2 98%   BMI 30.26 kg/m²   Wt Readings from Last 3 Encounters:   09/28/22 193 lb 3.2 oz (87.6 kg)   09/12/22 196 lb (88.9 kg)   03/07/22 208 lb (94.3 kg)     BP Readings from Last 3 Encounters:   09/28/22 120/80   09/12/22 120/76   03/07/22 (!) 137/90     Pulse Readings from Last 3 Encounters:   09/28/22 65   09/12/22 71   03/07/22 72     Wt Readings from Last 3 Encounters:   09/28/22 193 lb 3.2 oz (87.6 kg)   09/12/22 196 lb (88.9 kg)   03/07/22 208 lb (94.3 kg)       Physical Exam  Vitals and nursing note reviewed. Constitutional:       General: He is not in acute distress. Appearance: Normal appearance. He is well-developed. He is obese. He is not diaphoretic. Comments: Pt gradually losing weight. HENT:      Right Ear: Tympanic membrane, ear canal and external ear normal. No middle ear effusion. Tympanic membrane is not retracted or bulging. Left Ear: Tympanic membrane, ear canal and external ear normal.  No middle ear effusion. Tympanic membrane is not retracted or bulging. Nose: Nose normal. No mucosal edema or rhinorrhea. Mouth/Throat:      Mouth: Mucous membranes are not pale, not dry and not cyanotic. No oral lesions. Dentition: No dental caries. Pharynx: Uvula midline. No oropharyngeal exudate, posterior oropharyngeal erythema or uvula swelling. Tonsils: No tonsillar abscesses. Eyes:      General: No scleral icterus. Right eye: No discharge.          Left eye: No discharge. Conjunctiva/sclera: Conjunctivae normal.      Right eye: Right conjunctiva is not injected. No exudate. Left eye: Left conjunctiva is not injected. No exudate. Pupils: Pupils are equal, round, and reactive to light. Neck:      Thyroid: No thyroid mass or thyromegaly. Trachea: Trachea and phonation normal. No tracheal tenderness or tracheal deviation. Cardiovascular:      Rate and Rhythm: Normal rate and regular rhythm. No extrasystoles are present. Pulses:           Dorsalis pedis pulses are 2+ on the right side and 2+ on the left side. Posterior tibial pulses are 2+ on the right side and 2+ on the left side. Heart sounds: S1 normal. Heart sounds not distant. No murmur heard. No friction rub. No gallop. No S3 or S4 sounds. Pulmonary:      Effort: Pulmonary effort is normal. No respiratory distress. Breath sounds: Normal breath sounds. No stridor. No decreased breath sounds, wheezing, rhonchi or rales. Abdominal:      General: There is no distension. Palpations: Abdomen is not rigid. There is no mass or pulsatile mass. Tenderness: There is no abdominal tenderness. There is no guarding or rebound. Negative signs include Carl's sign and McBurney's sign. Musculoskeletal:      Cervical back: Neck supple. Lymphadenopathy:      Head:      Right side of head: No submandibular or tonsillar adenopathy. Left side of head: No submandibular or tonsillar adenopathy. Cervical: No cervical adenopathy. Right cervical: No superficial, deep or posterior cervical adenopathy. Left cervical: No superficial, deep or posterior cervical adenopathy. Upper Body:      Right upper body: No supraclavicular or pectoral adenopathy. Left upper body: No supraclavicular or pectoral adenopathy. Skin:     General: Skin is warm and dry. Coloration: Skin is not pale. Findings: No lesion. Nails: There is no clubbing.    Neurological: Mental Status: He is alert. Motor: No tremor or abnormal muscle tone. Coordination: Coordination normal.      Gait: Gait normal.      Deep Tendon Reflexes:      Reflex Scores:       Patellar reflexes are 2+ on the right side and 2+ on the left side. Psychiatric:         Attention and Perception: Attention and perception normal.         Mood and Affect: Mood and affect normal.         Speech: Speech is delayed. Behavior: Behavior normal. Behavior is cooperative. Cognition and Memory: Cognition is impaired. Memory is impaired. He exhibits impaired recent memory and impaired remote memory. Judgment: Judgment normal.      Comments: Very slight delay in responses now. Had been more prolonged in the past.  Minimal to no stuttering. History of brain damage birth but high functioning.       Latest Reference Range & Units 8/15/21 19:04 8/25/21 09:55 3/10/22 10:35   Sodium 136 - 145 mmol/L 142     Potassium 3.5 - 5.1 mmol/L 4.3     Chloride 99 - 110 mmol/L 105     CO2 21 - 32 mmol/L 29     BUN,BUNPL 7 - 20 mg/dL 10     Creatinine 0.9 - 1.3 mg/dL 1.2     Anion Gap 3 - 16  8     GFR Non- >60  >60     GFR  >60  >60     Glucose, Random 70 - 99 mg/dL 78     CALCIUM, SERUM, 708116 8.3 - 10.6 mg/dL 9.7     Total Protein 6.4 - 8.2 g/dL 7.6     Troponin <0.01 ng/mL <0.01     Albumin 3.4 - 5.0 g/dL 4.5     Globulin g/dL 3.1     Albumin/Globulin Ratio 1.1 - 2.2  1.5     Alk Phos 40 - 129 U/L 89     ALT 10 - 40 U/L 10     AST 15 - 37 U/L 17     Bilirubin 0.0 - 1.0 mg/dL <0.2     Lamotrigine Lvl 3.0 - 15.0 ug/mL  14.5 12.9   Levetiracetam 6.0 - 46.0 ug/mL  5.0 (L) 4.5 (L)   WBC 4.0 - 11.0 K/uL 7.3     RBC 4.20 - 5.90 M/uL 5.10     Hemoglobin Quant 13.5 - 17.5 g/dL 15.9     Hematocrit 40.5 - 52.5 % 46.6     MCV 80.0 - 100.0 fL 91.2     MCH 26.0 - 34.0 pg 31.2     MCHC 31.0 - 36.0 g/dL 34.2     MPV 5.0 - 10.5 fL 7.8     RDW 12.4 - 15.4 % 12.9     Platelet Count 999 - 450 K/uL 289     Neutrophils % % 51.1     Lymphocyte % % 36.8     Monocytes % % 10.7     Eosinophils % % 0.4     Basophils % % 1.0     Neutrophils Absolute 1.7 - 7.7 K/uL 3.7     Lymphocytes Absolute 1.0 - 5.1 K/uL 2.7     Monocytes Absolute 0.0 - 1.3 K/uL 0.8     Eosinophils Absolute 0.0 - 0.6 K/uL 0.0     Basophils Absolute 0.0 - 0.2 K/uL 0.1       ONE THE CHEST XRAY VIEW OF   8/15/2021 6:08 pm  COMPARISON:  04/07/2020  HISTORY:  ORDERING SYSTEM PROVIDED HISTORY: SOB  TECHNOLOGIST PROVIDED HISTORY:  Reason for exam:->SOB  Reason for Exam: SOB  Acuity: Acute  Type of Exam: Initial     FINDINGS:  Questionable patchy consolidation in the lingula. Otherwise no lung  consolidation. No pneumothorax or pleural effusion. Heart size is normal.     IMPRESSION:  Questionable lingular consolidation. CT OF THE HEAD WITHOUT CONTRAST  8/15/2021 7:26 pm  COMPARISON:  CT head 03/01/2021. HISTORY:  ORDERING SYSTEM PROVIDED HISTORY: fall last week  TECHNOLOGIST PROVIDED HISTORY:  If patient is on cardiac monitor and/or pulse ox, they may be taken off  cardiac monitor and pulse ox, left on O2 if currently on. All monitors  reattached when patient returns to room. Has a \"code stroke\" or \"stroke alert\" been called? ->No  Reason for exam:->fall last week  Decision Support Exception - unselect if not a suspected or confirmed  emergency medical condition->Emergency Medical Condition (MA)  Reason for Exam: fall last week  Acuity: Acute  Type of Exam: Initial  Relevant Medical/Surgical History: Medication Reaction (Patient reports he  takes medications for his seizures - believes his weakness in legs are  related to his medication. Doesn't know the name of medication. )     FINDINGS:  BRAIN/VENTRICLES: No acute hemorrhage. Sandra Lady white differentiation appears  maintained given artifact near the skull base and through the posterior  fossa. Artifact partially obscures the mike. Artifact partially obscures the  inferior cerebellum.  Mild prominence of the ventricles noted. There is no  midline shift. Basal cisterns appear patent. ORBITS: Visualized orbits appear unremarkable on this unenhanced exam.     SINUSES: Visualized paranasal sinuses appear clear. Visualized mastoid air  cells appear clear. SOFT TISSUES/SKULL: No depressed calvarial fracture identified. IMPRESSION:  1. No acute hemorrhage or midline shift. 2. Other findings as described. EKG 12/15/2021 1858  Normal sinus rhythm. Possible Left atrial enlargement. RSR' or QR pattern in V1 suggests right ventricular conduction delay. No significant change was found from ECG of 3/1/21Confirmed     Assessment   Plan        Patient Instructions   Reyna Caceres was seen today for annual exam.    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 Per Dr Beal Daily. Hypercholesterolemia  -     Lipid Panel; Standing fasting blood work in 1 week. You can lower your LDL cholesterol by decreasing your saturated fats, eating fewer egg yolks and using egg substitutes, eating smaller portions of red meat and using more skinless poultry and fish to meet your protein needs. Greek yogurt and low-fat cottage cheese are good sources of protein which are low in cholesterol. Try to get some of your proteins from plant sources such as beans, nuts, peas and / or soy products such as tofu. Adding fruits and vegetables to your diet (a total of 5 servings or more between the two) can help also.  They help to fill you up so you eat less of the cholesterol raising foods and the fiber lowers cholesterol. Needs flu shot  -     Influenza, FLUCELVAX, (age 10 mo+), IM, Multi-Dose Vial, 0.5 mL    Need for prophylactic vaccination with tetanus-diphtheria (Td)  -     Tdap (age 6y and older) IM (Boostrix)    Medication monitoring encounter  -     Hepatic Function Panel; Standing    Well Visit, Ages 25 to 72: Care Instructions  Well visits can help you stay healthy. Your doctor has checked your overall health and may have suggested ways to take good care of yourself. Your doctor also may have recommended tests. You can help prevent illness with healthy eating, good sleep, vaccinations, regular exercise, and other steps. Get the tests that you and your doctor decide on. Depending on your age and risks, examples might include screening for diabetes; hepatitis C; HIV; and cervical, breast, lung, and colon cancer. Screening helps find diseases before any symptoms appear. Eat healthy foods. Choose fruits, vegetables, whole grains, lean protein, and low-fat dairy foods. Limit saturated fat and reduce salt. Limit alcohol. Men should have no more than 2 drinks a day. Women should have no more than 1. For some people, no alcohol is the best choice. Exercise. Get at least 30 minutes of exercise on most days of the week. Walking can be a good choice. Reach and stay at your healthy weight. This will lower your risk for many health problems. Take care of your mental health. Try to stay connected with friends, family, and community, and find ways to manage stress. If you're feeling depressed or hopeless, talk to someone. A counselor can help. If you don't have a counselor, talk to your doctor. Talk to your doctor if you think you may have a problem with alcohol or drug use. This includes prescription medicines and illegal drugs. Avoid tobacco and nicotine: Don't smoke, vape, or chew. If you need help quitting, talk to your doctor. Practice safer sex.  Getting tested, using condoms or dental dams, and limiting sex partners can help prevent STIs. Use birth control if it's important to you to prevent pregnancy. Talk with your doctor about your choices and what might be best for you. Prevent problems where you can. Protect your skin from too much sun, wash your hands, brush your teeth twice a day, and wear a seat belt in the car. Where can you learn more? Go to https://chpepeteewdeann.health-partners. org and sign in to your [employer] account. Enter P072 in the [facility] box to learn more about \"Well Visit, Ages 25 to 72: Care Instructions. \"     If you do not have an account, please click on the \"Sign Up Now\" link. Current as of: March 9, 2022               Content Version: 13.4  © 2006-2022 Healthwise, [employer]. Care instructions adapted under license by [facility] [place] ([facility]). If you have questions about a medical condition or this instruction, always ask your healthcare professional. [place] [address] any warranty or liability for your use of this information. A Healthy Lifestyle: Care Instructions  A healthy lifestyle can help you feel good, have more energy, and stay at a weight that's healthy for you. You can share a healthy lifestyle with your friends and family. And you can do it on your own. Eat meals with your friends or family. You could try cooking together. Plan activities with other people. Go for a walk with a friend, try a free online fitness class, or join a sports league. Eat a variety of healthy foods. These include fruits, vegetables, whole grains, low-fat dairy, and lean protein. Choose healthy portions of food. You can use the Nutrition Facts label on food packages as a guide. Eat more fruits and vegetables. You could add vegetables to sandwiches or add fruit to cereal.  Drink water when you are thirsty. Limit soda, juice, and sports drinks. Try to exercise most days. Aim for at least 2½ hours of exercise each week. Keep moving.  Work in the garden or take your dog on a walk. Use the stairs instead of the elevator. If you use tobacco or nicotine, try to quit. Ask your doctor about programs and medicines to help you quit. Limit alcohol. Men should have no more than 2 drinks a day. Women should have no more than 1. For some people, no alcohol is the best choice. Follow-up care is a key part of your treatment and safety. Be sure to make and go to all appointments, and call your doctor if you are having problems. It's also a good idea to know your test results and keep a list of the medicines you take. Where can you learn more? Go to https://[employer] Servicespepiceweb.[email or web address]. org and sign in to your [employer] account. Enter G177 in the [place] [facility] box to learn more about \"A Healthy Lifestyle: Care Instructions. \"     If you do not have an account, please click on the \"Sign Up Now\" link. Current as of: March 9, 2022               Content Version: 13.4  © 2006-2022 [employer]. Care instructions adapted under license by [facility] ([facility]). If you have questions about a medical condition or this instruction, always ask your healthcare professional. [place] [address] any warranty or liability for your use of this information. Heart-Healthy Diet   Sodium, Fat, and Cholesterol Controlled Diet       What Is a Heart Healthy Diet? A heart-healthy diet is one that limits sodium , certain types of fat , and cholesterol . This type of diet is recommended for:   People with any form of cardiovascular disease (eg, coronary heart disease , peripheral vascular disease , previous heart attack , previous stroke )   People with risk factors for cardiovascular disease, such as high blood pressure , high cholesterol , or diabetes   Anyone who wants to lower their risk of developing cardiovascular disease   Sodium    Sodium is a mineral found in many foods. In general, most people consume much more sodium than they need.  Diets high in sodium can increase blood pressure and lead to edema (water retention). On a heart-healthy diet, you should consume no more than 2,300 mg (milligrams) of sodium per dayabout the amount in one teaspoon of table salt. The foods highest in sodium include table salt (about 50% sodium), processed foods, convenience foods, and preserved foods. Cholesterol    Cholesterol is a fat-like, waxy substance in your blood. Our bodies make some cholesterol. It is also found in animal products, with the highest amounts in fatty meat, egg yolks, whole milk, cheese, shellfish, and organ meats. On a heart-healthy diet, you should limit your cholesterol intake to less than 200 mg per day. It is normal and important to have some cholesterol in your bloodstream. But too much cholesterol can cause plaque to build up within your arteries, which can eventually lead to a heart attack or stroke. The two types of cholesterol that are most commonly referred to are:   Low-density lipoprotein (LDL) cholesterol  Also known as bad cholesterol, this is the cholesterol that tends to build up along your arteries. Bad cholesterol levels are increased by eating fats that are saturated or hydrogenated. Optimal level of this cholesterol is less than 100. Over 130 starts to get risky for heart disease. High-density lipoprotein (HDL) cholesterol  Also known as good cholesterol, this type of cholesterol actually carries cholesterol away from your arteries and may, therefore, help lower your risk of having a heart attack. You want this level to be high (ideally greater than 60). It is a risk to have a level less than 40. You can raise this good cholesterol by eating olive oil, canola oil, avocados, or nuts. Exercise raises this level, too. Fat    Fat is calorie dense and packs a lot of calories into a small amount of food. Even though fats should be limited due to their high calorie content, not all fats are bad. In fact, some fats are quite healthful.  Fat can be broken down into four main types. The good-for-you fats are:   Monounsaturated fat  found in oils such as olive and canola, avocados, and nuts and natural nut butters; can decrease cholesterol levels, while keeping levels of HDL cholesterol high   Polyunsaturated fat  found in oils such as safflower, sunflower, soybean, corn, and sesame; can decrease total cholesterol and LDL cholesterol   Omega-3 fatty acids  particularly those found in fatty fish (such as salmon, trout, tuna, mackerel, herring, and sardines); can decrease risk of arrhythmias, decrease triglyceride levels, and slightly lower blood pressure   The fats that you want to limit are:   Saturated fat  found in animal products, many fast foods, and a few vegetables; increases total blood cholesterol, including LDL levels   Animal fats that are saturated include: butter, lard, whole-milk dairy products, meat fat, and poultry skin   Vegetable fats that are saturated include: hydrogenated shortening, palm oil, coconut oil, cocoa butter   Hydrogenated or trans fat  found in margarine and vegetable shortening, most shelf stable snack foods, and fried foods; increases LDL and decreases HDL     It is generally recommended that you limit your total fat for the day to less than 30% of your total calories. If you follow an 1800-calorie heart healthy diet, for example, this would mean 60 grams of fat or less per day. Saturated fat and trans fat in your diet raises your blood cholesterol the most, much more than dietary cholesterol does. For this reason, on a heart-healthy diet, less than 7% of your calories should come from saturated fat and ideally 0% from trans fat. On an 1800-calorie diet, this translates into less than 14 grams of saturated fat per day, leaving 46 grams of fat to come from mono- and polyunsaturated fats.    Food Choices on a Heart Healthy Diet   Food Category   Foods Recommended   Foods to Avoid   Grains   Breads and rolls without salted tops Most dry and cooked cereals Unsalted crackers and breadsticks Low-sodium or homemade breadcrumbs or stuffing All rice and pastas   Breads, rolls, and crackers with salted tops High-fat baked goods (eg, muffins, donuts, pastries) Quick breads, self-rising flour, and biscuit mixes Regular bread crumbs Instant hot cereals Commercially prepared rice, pasta, or stuffing mixes   Vegetables   Most fresh, frozen, and low-sodium canned vegetables Low-sodium and salt-free vegetable juices Canned vegetables if unsalted or rinsed   Regular canned vegetables and juices, including sauerkraut and pickled vegetables Frozen vegetables with sauces Commercially prepared potato and vegetable mixes   Fruits   Most fresh, frozen, and canned fruits All fruit juices   Fruits processed with salt or sodium   Milk   Nonfat or low-fat (1%) milk Nonfat or low-fat yogurt Cottage cheese, low-fat ricotta, cheeses labeled as low-fat and low-sodium   Whole milk Reduced-fat (2%) milk Malted and chocolate milk Full fat yogurt Most cheeses (unless low-fat and low salt) Buttermilk (no more than 1 cup per week)   Meats and Beans   Lean cuts of fresh or frozen beef, veal, lamb, or pork (look for the word loin) Fresh or frozen poultry without the skin Fresh or frozen fish and some shellfish Egg whites and egg substitutes (Limit whole eggs to three per week) Tofu Nuts or seeds (unsalted, dry-roasted), low-sodium peanut butter Dried peas, beans, and lentils   Any smoked, cured, salted, or canned meat, fish, or poultry (including hardin, chipped beef, cold cuts, hot dogs, sausages, sardines, and anchovies) Poultry skins Breaded and/or fried fish or meats Canned peas, beans, and lentils Salted nuts   Fats and Oils   Olive oil and canola oil Low-sodium, low-fat salad dressings and mayonnaise   Butter, margarine, coconut and palm oils, hardin fat   Snacks, Sweets, and Condiments   Low-sodium or unsalted versions of broths, soups, soy sauce, and condiments Pepper, herbs, and spices; vinegar, lemon, or lime juice Low-fat frozen desserts (yogurt, sherbet, fruit bars) Sugar, cocoa powder, honey, syrup, jam, and preserves Low-fat, trans-fat free cookies, cakes, and pies Edu and animal crackers, fig bars, zachariah snaps   High-fat desserts Broth, soups, gravies, and sauces, made from instant mixes or other high-sodium ingredients Salted snack foods Canned olives Meat tenderizers, seasoning salt, and most flavored vinegars   Beverages   Low-sodium carbonated beverages Tea and coffee in moderation Soy milk   Commercially softened water   Suggestions   Make whole grains, fruits, and vegetables the base of your diet. Choose heart-healthy fats such as canola, olive, and flaxseed oil, and foods high in heart-healthy fats, such as nuts, seeds, soybeans, tofu, and fish. Eat fish at least twice per week; the fish highest in omega-3 fatty acids and lowest in mercury include salmon, herring, mackerel, sardines, and canned chunk light tuna. If you eat fish less than twice per week or have high triglycerides, talk to your doctor about taking fish oil supplements. Read food labels. For products low in fat and cholesterol, look for fat free, low-fat, cholesterol free, saturated fat free, and trans fat freeAlso scan the Nutrition Facts Label, which lists saturated fat, trans fat, and cholesterol amounts. For products low in sodium, look for sodium free, very low sodium, low sodium, no added salt, and unsalted   Skip the salt when cooking or at the table; if food needs more flavor, get creative and try out different herbs and spices. Garlic and onion also add substantial flavor to foods. Trim any visible fat off meat and poultry before cooking, and drain the fat off after fuentes. Use cooking methods that require little or no added fat, such as grilling, boiling, baking, poaching, broiling, roasting, steaming, stir-frying, and sauting.     Avoid fast food and convenience food. They tend to be high in saturated and trans fat and have a lot of added salt. Talk to a registered dietitian for individualized diet advice. Last Reviewed: March 2011 Baljinder Jansen MS, MPH, RD   Updated: 3/29/2011     High-Fiber Diet   What Is Fiber? Dietary fiber is a form of carbohydrate found in plants that cannot be digested by humans. All plants contain fiber, including fruits, vegetables, grains, and legumes. Fiber is often classified into two categories: soluble and insoluble. Soluble fiber draws water into the bowel and can help slow digestion. Examples of foods that are high in soluble fiber include oatmeal, oat bran, barley, legumes (eg, beans and peas), apples, and strawberries. Insoluble fiber speeds digestion and can add bulk to the stool. Examples of foods that are high in insoluble fiber include whole-wheat products, wheat bran, cauliflower, green beans, and potatoes. Why Follow a High-Fiber Diet? A high-fiber diet is often recommended to prevent and treat constipation , hemorrhoids , diverticulitis , and irritable bowel syndrome . Eating a high-fiber diet can also help improve your cholesterol levels, lower your risk of coronary heart disease , reduce your risk of type 2 diabetes , and lower your weight. For people with type 1 or 2 diabetes, a high-fiber diet can also help stabilize blood sugar levels. How Much Fiber Should I Eat? A high-fiber diet should contain  20-35 grams  of fiber a day. This is actually the amount recommended for the general adult population; however, most Americans eat only 15 grams of fiber per day. Digestion of Fiber   Eating a higher fiber diet than usual can take some getting used to by your body's digestive system. To avoid the side effects of sudden increases in dietary fiber (eg, gas, cramping, bloating, and diarrhea), increase fiber gradually and be sure to drink plenty of fluids every day.    Tips for Increasing Fiber Intake Whenever possible, choose whole grains over refined grains (eg, brown rice instead of white rice, whole-wheat bread instead of white bread). Include a variety of grains in your diet, such as wheat, rye, barley, oats, quinoa, and bulgur. Eat more vegetarian-based meals. Here are some ideas: black bean burgers, eggplant lasagna, and veggie tofu stir-mckeon. Choose high-fiber snacks, such as fruits, popcorn, whole-grain crackers, and nuts. Make whole-grain cereal or whole-grain toast part of your daily breakfast regime. When eating out, whether ordering a sandwich or dinner, ask for extra vegetables. When baking, replace part of the white flour with whole-wheat flour. Whole-wheat flour is particularly easy to incorporate into a recipe. High-Fiber Diet Eating Guide   Food Category   Foods Recommended   Notes   Grains   Whole-grain breads, muffins, bagels, or louisa bread Rye bread Whole-wheat crackers or crisp breads Whole-grain or bran cereals Oatmeal, oat bran, or grits Wheat germ Whole-wheat pasta and brown rice   Read the ingredients list on food labels. Look for products that list \"whole\" as the first ingredient (eg, whole-wheat, whole oats). Choose cereals with at least 2 grams of fiber per serving. Vegetables   All vegetables, especially asparagus, bean sprouts, broccoli, [place] sprouts, cabbage, carrots, cauliflower, celery, corn, greens, green beans, green pepper, onions, peas, potatoes (with skin), snow peas, spinach, squash, sweet potatoes, tomatoes, zucchini   For maximum fiber intake, eat the peels of fruits and vegetablesjust be sure to wash them well first.   Fruits   All fruits, especially apples, berries, grapefruits, mangoes, nectarines, oranges, peaches, pears, dried fruits (figs, dates, prunes, raisins)   Choose raw fruits and vegetables over juice, cooked, or cannedraw fruit has more fiber. Dried fruit is also a good source of fiber.    Milk   With the exception of yogurt containing inulin (a type of fiber), dairy foods provide little fiber. Add more fiber by topping your yogurt or cottage cheese with fresh fruit, whole grain or bran cereals, nuts, or seeds. Meats and Beans   All beans and peas, especially Garbanzo beans, kidney beans, lentils, lima beans, split peas, and ramey beans All nuts and seeds, especially almonds, peanuts, [place] nuts, cashews, peanut butter, walnuts, sesame and sunflower seeds All meat, poultry, fish, and eggs   Increase fiber in meat dishes by adding ramey beans, kidney beans, black-eyed peas, bran, or oatmeal. If you are following a low-fat diet, use nuts and seeds only in moderation. Fats and Oils   All in moderation   Fats and oils do not provide fiber   Snacks, Sweets, and Condiments   Fruit Nuts Popcorn, whole-wheat pretzels, or trail mix made with dried fruits, nuts, and seeds Cakes, breads, and cookies made with oatmeal or whole-wheat flour   Most snack foods do not provide much fiber. Choose snacks with at least 2 grams of fiber per serving.      Last Reviewed: March 2011 Joo Oliveira MS, MPH, RD   Updated: 3/29/2011     Personalized Preventive Plan   Current Health Maintenance Status  Immunization History   Administered Date(s) Administered    DT (pediatric) 06/25/2002    DTP 01/02/1997, 02/21/1997, 05/02/1997, 12/09/1997    DTaP 01/02/1997, 02/21/1997, 05/02/1997, 12/09/1997, 06/25/2002    HIB PRP-T (ActHIB, Hiberix) 01/02/1997, 02/21/1997, 05/02/1997, 12/09/1997    HPV (Human Papilloma Virus)Vaccine 12/02/2010, 03/16/2011    HPV Quadrivalent (Gardasil) 05/12/2010, 12/02/2010, 03/16/2011    Hepatitis A 03/30/2017    Hepatitis A Adult (Vaqta) 03/30/2017    Hepatitis A Ped/Adol (Havrix, Vaqta) 03/30/2017    Hepatitis B [birth year], 12/09/1997    Hepatitis B Ped/Adol (Engerix-B, Recombivax HB) 1995, 1995, 12/09/1997    Influenza Live, intranasal, LAIV3 10/18/2013    Influenza Nasal 10/18/2013    Influenza Vaccine, unspecified formulation 12/15/2010, 09/23/2011, 10/21/2015    Influenza Virus Vaccine 12/01/2010, 12/15/2010, 09/23/2011, 10/21/2015    MMR 12/09/1997, 09/24/2001    Meningococcal MCV4P (Menactra) 03/20/2009    Polio IPV (IPOL) 01/02/1997, 02/21/1997, 05/02/1997, 09/24/2001    Tdap (Boostrix, Adacel) 03/20/2009    Varicella (Varivax) 09/24/2001, 03/20/2009, 09/24/2009        Health Maintenance   Topic Date Due    COVID-19 Vaccine (1) Never done    Hepatitis C screen  Never done    DTaP/Tdap/Td vaccine (7 - Td or Tdap) 03/20/2019    Flu vaccine (1) 08/01/2022    Depression Screen  08/04/2022    Hepatitis B vaccine  Completed    Hib vaccine  Completed    HPV vaccine  Completed    Varicella vaccine  Completed    HIV screen  Completed    Hepatitis A vaccine  Aged Out    Meningococcal (ACWY) vaccine  Aged Out    Pneumococcal 0-64 years Vaccine  Aged Out     Recommendations for [email or web address] Due: see orders and patient instructions/AVS.    Return in 1 year (on 9/28/2023) for Physical -  12 hour fasting blood work in our office (should drink water and can  black coffee/tea).     Ressie Suyapa, DO

## 2022-10-07 ENCOUNTER — HOSPITAL ENCOUNTER (OUTPATIENT)
Age: 27
Discharge: HOME | End: 2022-10-07
Payer: COMMERCIAL

## 2022-10-07 DIAGNOSIS — G40.209 COMPLEX PARTIAL EPILEPSY WITH GENERALIZATION AND WITH NONINTRACTABLE EPILEPSY (HCC): ICD-10-CM

## 2022-10-07 DIAGNOSIS — E78.00 HYPERCHOLESTEROLEMIA: ICD-10-CM

## 2022-10-07 DIAGNOSIS — Z51.81 MEDICATION MONITORING ENCOUNTER: ICD-10-CM

## 2022-10-07 LAB
ALBUMIN SERPL-MCNC: 4.7 G/DL (ref 3.4–5)
ALP BLD-CCNC: 88 U/L (ref 40–129)
ALT SERPL-CCNC: 12 U/L (ref 10–40)
AST SERPL-CCNC: 23 U/L (ref 15–37)
BILIRUB SERPL-MCNC: <0.2 MG/DL (ref 0–1)
BILIRUBIN DIRECT: <0.2 MG/DL (ref 0–0.3)
BILIRUBIN, INDIRECT: NORMAL MG/DL (ref 0–1)
CHOLESTEROL, TOTAL: 218 MG/DL (ref 0–199)
HDLC SERPL-MCNC: 56 MG/DL (ref 40–60)
LDL CHOLESTEROL CALCULATED: 153 MG/DL
TOTAL PROTEIN: 7.5 G/DL (ref 6.4–8.2)
TRIGL SERPL-MCNC: 43 MG/DL (ref 0–150)
VLDLC SERPL CALC-MCNC: 9 MG/DL

## 2022-10-07 PROCEDURE — 80076 HEPATIC FUNCTION PANEL: CPT

## 2022-10-07 PROCEDURE — 36415 COLL VENOUS BLD VENIPUNCTURE: CPT

## 2022-10-07 PROCEDURE — 80061 LIPID PANEL: CPT

## 2023-03-13 ENCOUNTER — OFFICE VISIT (OUTPATIENT)
Dept: NEUROLOGY | Age: 28
End: 2023-03-13
Payer: COMMERCIAL

## 2023-03-13 VITALS
SYSTOLIC BLOOD PRESSURE: 134 MMHG | HEART RATE: 64 BPM | HEIGHT: 67 IN | WEIGHT: 193 LBS | BODY MASS INDEX: 30.29 KG/M2 | DIASTOLIC BLOOD PRESSURE: 87 MMHG

## 2023-03-13 DIAGNOSIS — G40.209 COMPLEX PARTIAL EPILEPSY WITH GENERALIZATION AND WITH NONINTRACTABLE EPILEPSY (HCC): Primary | ICD-10-CM

## 2023-03-13 DIAGNOSIS — R62.50 DEVELOPMENTAL DELAY: ICD-10-CM

## 2023-03-13 PROCEDURE — G8417 CALC BMI ABV UP PARAM F/U: HCPCS | Performed by: PSYCHIATRY & NEUROLOGY

## 2023-03-13 PROCEDURE — G8484 FLU IMMUNIZE NO ADMIN: HCPCS | Performed by: PSYCHIATRY & NEUROLOGY

## 2023-03-13 PROCEDURE — 99213 OFFICE O/P EST LOW 20 MIN: CPT | Performed by: PSYCHIATRY & NEUROLOGY

## 2023-03-13 PROCEDURE — G8427 DOCREV CUR MEDS BY ELIG CLIN: HCPCS | Performed by: PSYCHIATRY & NEUROLOGY

## 2023-03-13 PROCEDURE — 1036F TOBACCO NON-USER: CPT | Performed by: PSYCHIATRY & NEUROLOGY

## 2023-03-13 RX ORDER — LAMOTRIGINE 25 MG/1
TABLET ORAL
Qty: 900 TABLET | Refills: 1 | Status: SHIPPED | OUTPATIENT
Start: 2023-03-13

## 2023-03-13 RX ORDER — LEVETIRACETAM 500 MG/1
TABLET ORAL
Qty: 180 TABLET | Refills: 1 | Status: SHIPPED | OUTPATIENT
Start: 2023-03-13

## 2023-03-13 NOTE — PROGRESS NOTES
[facility]   Neurology followup    Subjective:   CC/HP  History was obtained from the patient. Patient states that he is doing well. He denies any new neurological symptoms. His last seizure was approximately 3 years ago. Ria Benavides He has not had any recent seizures. He takes his medications regularly. Detailed history:  Patient has had seizures ever since was a infant. She was initially seen at [facility] for epilepsy. Patient had been on several medications. Patient has a history of developmental delay. Patient is and was seen by Dr. Demetra Navarrete but now because of insurance reasons he could not see them anymore. Patient started missing medications since he was not seeing his neurologist and would have breakthrough seizures. Patient was referred here for further evaluation. The last seizure was a couple of months ago. Seizure description: Patient would get a strange look on his face and then have trembling or jerking of his arms and legs. She sometimes with a tongue or lip biting. He had other times would have bladder or bowel incontinence.   He would be postictal and then come back to normal.    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 Seizures began in infancy. Was on Depakote as a sole agent. Then on Lamictal.  Then on Lamictal and Keppra. Seizures are usually just staring episodes where he is not in contact with his surroundings and has some head movements. Developmental delay 1995    Due to difficult birth. 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     Family History   Problem Relation Age of Onset    Hypertension Mother     No Known Problems Father     Asthma Sister     Diabetes Brother     Arthritis Maternal Grandmother     Hypertension Maternal Grandmother     High Cholesterol Maternal Grandmother     Hypertension Maternal Grandfather      Social History     Socioeconomic History    Marital status: Single     Spouse name: None    Number of children: 0    Years of education: 12    Highest education level: High school graduate   Occupational History    Occupation: [employer]     Employer: [employer]     Comment: alejandro garcía    Occupation: unemployed   Tobacco Use    Smoking status: Never    Smokeless tobacco: Never   Vaping Use    Vaping Use: Never used   Substance and Sexual Activity    Alcohol use: No     Comment: never drinks alcohol    Drug use: No    Sexual activity: Not Currently     Partners: Female   Social History Narrative    No exercise. 5/8/2020. Walking a lot now. 8/4/21. He walks every day. He is barely at home. 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  9/28/22     Social Determinants of Health     Financial Resource Strain: Low Risk     Difficulty of Paying Living Expenses: Not hard at all   Food Insecurity: No Food Insecurity    Worried About Running Out of Food in the Last Year: Never true    Ran Out of Food in the Last Year: Never true   Transportation Needs: No Transportation Needs    Lack of Transportation (Medical): No    Lack of Transportation (Non-Medical): No        Objective:  Exam:  /87   Pulse 64   Ht 5' 7\" (1.702 m)   Wt 193 lb (87.5 kg)   BMI 30.23 kg/m²   This is a well-nourished patient in no acute distress  Patient is awake, alert and oriented x3. Speech is normal.  Pupils are equal round reacting to light. Extraocular movements intact. Face symmetrical. Tongue midline. Motor exam shows normal symmetrical strength. Deep tendon reflexes normal. Plantar reflexes downgoing. Sensory exam normal. Coordination normal. Gait normal. No carotid bruit. No neck stiffness. Data :  LABS:  General Labs:    CBC:   Lab Results   Component Value Date/Time    WBC 7.3 08/15/2021 07:04 PM    RBC 5.10 08/15/2021 07:04 PM    HGB 15.9 08/15/2021 07:04 PM    HCT 46.6 08/15/2021 07:04 PM    MCV 91.2 08/15/2021 07:04 PM    MCH 31.2 08/15/2021 07:04 PM    MCHC 34.2 08/15/2021 07:04 PM    RDW 12.9 08/15/2021 07:04 PM     08/15/2021 07:04 PM    MPV 7.8 08/15/2021 07:04 PM     BMP:    Lab Results   Component Value Date/Time     08/15/2021 07:04 PM    K 4.3 08/15/2021 07:04 PM    K 3.8 04/07/2020 01:13 PM     08/15/2021 07:04 PM    CO2 29 08/15/2021 07:04 PM    BUN 10 08/15/2021 07:04 PM    LABALBU 4.7 10/07/2022 04:32 PM    CREATININE 1.2 08/15/2021 07:04 PM    CALCIUM 9.7 08/15/2021 07:04 PM    GFRAA >60 08/15/2021 07:04 PM    LABGLOM >60 08/15/2021 07:04 PM    GLUCOSE 78 08/15/2021 07:04 PM       Impression :  Partial complex seizures, usually well controlled as long as he takes his medication regularly  Developmental delay  EEG done at [facility] in 2011 was abnormal with bursts of spikes and polyspike in the bilateral frontal regions with diffuse spread  MRI brain done at [facility] in 2009 was normal  Last seizure was approximately an July 2020 and at that time he had missed his medicines.     Plan :  Discussed with patient   Continue same dose of Keppra as well as lamotrigine  Stressed the importance of taking medications regularly without fail and not missing doses  Prescriptions refilled  Return in 6 months      Please note a portion of  this chart was generated using dragon dictation software. Although every effort was made to ensure the accuracy of this automated transcription, some errors in transcription may have occurred.

## 2023-05-23 ENCOUNTER — HOSPITAL ENCOUNTER (INPATIENT)
Age: 28
LOS: 2 days | Discharge: HOME | DRG: 053 | End: 2023-05-25
Attending: INTERNAL MEDICINE | Admitting: INTERNAL MEDICINE
Payer: COMMERCIAL

## 2023-05-23 ENCOUNTER — HOSPITAL ENCOUNTER (EMERGENCY)
Age: 28
Discharge: ANOTHER HOSPITAL | End: 2023-05-23
Attending: STUDENT IN AN ORGANIZED HEALTH CARE EDUCATION/TRAINING PROGRAM
Payer: COMMERCIAL

## 2023-05-23 VITALS
HEART RATE: 71 BPM | RESPIRATION RATE: 18 BRPM | BODY MASS INDEX: 31.39 KG/M2 | OXYGEN SATURATION: 98 % | SYSTOLIC BLOOD PRESSURE: 106 MMHG | WEIGHT: 200 LBS | TEMPERATURE: 97 F | HEIGHT: 67 IN | DIASTOLIC BLOOD PRESSURE: 66 MMHG

## 2023-05-23 DIAGNOSIS — R56.9 SEIZURE SECONDARY TO SUBTHERAPEUTIC ANTICONVULSANT MEDICATION (HCC): Primary | ICD-10-CM

## 2023-05-23 DIAGNOSIS — Z79.899 SEIZURE SECONDARY TO SUBTHERAPEUTIC ANTICONVULSANT MEDICATION (HCC): Primary | ICD-10-CM

## 2023-05-23 DIAGNOSIS — G40.901 STATUS EPILEPTICUS (HCC): ICD-10-CM

## 2023-05-23 PROBLEM — G40.919 BREAKTHROUGH SEIZURE (HCC): Status: ACTIVE | Noted: 2023-05-23

## 2023-05-23 LAB
ALBUMIN SERPL-MCNC: 4.4 G/DL (ref 3.4–5)
ALBUMIN/GLOB SERPL: 1.8 {RATIO} (ref 1.1–2.2)
ALP SERPL-CCNC: 68 U/L (ref 40–129)
ALT SERPL-CCNC: 12 U/L (ref 10–40)
ANION GAP SERPL CALCULATED.3IONS-SCNC: 8 MMOL/L (ref 3–16)
AST SERPL-CCNC: 18 U/L (ref 15–37)
BASOPHILS # BLD: 0 K/UL (ref 0–0.2)
BASOPHILS NFR BLD: 0.3 %
BILIRUB SERPL-MCNC: 0.3 MG/DL (ref 0–1)
BUN SERPL-MCNC: 13 MG/DL (ref 7–20)
CALCIUM SERPL-MCNC: 9.1 MG/DL (ref 8.3–10.6)
CHLORIDE SERPL-SCNC: 106 MMOL/L (ref 99–110)
CO2 SERPL-SCNC: 27 MMOL/L (ref 21–32)
CREAT SERPL-MCNC: 1.1 MG/DL (ref 0.9–1.3)
DEPRECATED RDW RBC AUTO: 12.5 % (ref 12.4–15.4)
EOSINOPHIL # BLD: 0 K/UL (ref 0–0.6)
EOSINOPHIL NFR BLD: 0.1 %
GFR SERPLBLD CREATININE-BSD FMLA CKD-EPI: >60 ML/MIN/{1.73_M2}
GLUCOSE SERPL-MCNC: 106 MG/DL (ref 70–99)
HCT VFR BLD AUTO: 45 % (ref 40.5–52.5)
HGB BLD-MCNC: 15.1 G/DL (ref 13.5–17.5)
LYMPHOCYTES # BLD: 1.5 K/UL (ref 1–5.1)
LYMPHOCYTES NFR BLD: 30.8 %
MCH RBC QN AUTO: 30.7 PG (ref 26–34)
MCHC RBC AUTO-ENTMCNC: 33.6 G/DL (ref 31–36)
MCV RBC AUTO: 91.2 FL (ref 80–100)
MONOCYTES # BLD: 0.5 K/UL (ref 0–1.3)
MONOCYTES NFR BLD: 9.9 %
NEUTROPHILS # BLD: 2.8 K/UL (ref 1.7–7.7)
NEUTROPHILS NFR BLD: 58.9 %
PLATELET # BLD AUTO: 256 K/UL (ref 135–450)
PMV BLD AUTO: 7.7 FL (ref 5–10.5)
POTASSIUM SERPL-SCNC: 4.2 MMOL/L (ref 3.5–5.1)
PROT SERPL-MCNC: 6.9 G/DL (ref 6.4–8.2)
RBC # BLD AUTO: 4.93 M/UL (ref 4.2–5.9)
SODIUM SERPL-SCNC: 141 MMOL/L (ref 136–145)
WBC # BLD AUTO: 4.7 K/UL (ref 4–11)

## 2023-05-23 PROCEDURE — 80177 DRUG SCRN QUAN LEVETIRACETAM: CPT

## 2023-05-23 PROCEDURE — 2060000000 HC ICU INTERMEDIATE R&B

## 2023-05-23 PROCEDURE — 6360000002 HC RX W HCPCS: Performed by: STUDENT IN AN ORGANIZED HEALTH CARE EDUCATION/TRAINING PROGRAM

## 2023-05-23 PROCEDURE — 99222 1ST HOSP IP/OBS MODERATE 55: CPT

## 2023-05-23 PROCEDURE — 6370000000 HC RX 637 (ALT 250 FOR IP): Performed by: STUDENT IN AN ORGANIZED HEALTH CARE EDUCATION/TRAINING PROGRAM

## 2023-05-23 PROCEDURE — 80175 DRUG SCREEN QUAN LAMOTRIGINE: CPT

## 2023-05-23 PROCEDURE — 80053 COMPREHEN METABOLIC PANEL: CPT

## 2023-05-23 PROCEDURE — 96375 TX/PRO/DX INJ NEW DRUG ADDON: CPT

## 2023-05-23 PROCEDURE — 85025 COMPLETE CBC W/AUTO DIFF WBC: CPT

## 2023-05-23 PROCEDURE — 6360000002 HC RX W HCPCS

## 2023-05-23 PROCEDURE — 96365 THER/PROPH/DIAG IV INF INIT: CPT

## 2023-05-23 PROCEDURE — 93005 ELECTROCARDIOGRAM TRACING: CPT | Performed by: PHYSICIAN ASSISTANT

## 2023-05-23 PROCEDURE — 2580000003 HC RX 258: Performed by: INTERNAL MEDICINE

## 2023-05-23 PROCEDURE — 99285 EMERGENCY DEPT VISIT HI MDM: CPT

## 2023-05-23 PROCEDURE — 6370000000 HC RX 637 (ALT 250 FOR IP): Performed by: INTERNAL MEDICINE

## 2023-05-23 RX ORDER — LAMOTRIGINE 25 MG/1
125 TABLET, CHEWABLE ORAL ONCE
Status: DISCONTINUED | OUTPATIENT
Start: 2023-05-23 | End: 2023-05-23

## 2023-05-23 RX ORDER — LORAZEPAM 2 MG/ML
2 INJECTION INTRAMUSCULAR ONCE
Status: COMPLETED | OUTPATIENT
Start: 2023-05-23 | End: 2023-05-23

## 2023-05-23 RX ORDER — ENOXAPARIN SODIUM 100 MG/ML
40 INJECTION SUBCUTANEOUS DAILY
Status: DISCONTINUED | OUTPATIENT
Start: 2023-05-24 | End: 2023-05-25 | Stop reason: HOSPADM

## 2023-05-23 RX ORDER — LORAZEPAM 2 MG/ML
INJECTION INTRAMUSCULAR
Status: COMPLETED
Start: 2023-05-23 | End: 2023-05-23

## 2023-05-23 RX ORDER — POLYETHYLENE GLYCOL 3350 17 G/17G
17 POWDER, FOR SOLUTION ORAL DAILY PRN
Status: DISCONTINUED | OUTPATIENT
Start: 2023-05-23 | End: 2023-05-25 | Stop reason: HOSPADM

## 2023-05-23 RX ORDER — ACETAMINOPHEN 325 MG/1
650 TABLET ORAL EVERY 6 HOURS PRN
Status: DISCONTINUED | OUTPATIENT
Start: 2023-05-23 | End: 2023-05-25 | Stop reason: HOSPADM

## 2023-05-23 RX ORDER — SODIUM CHLORIDE 9 MG/ML
INJECTION, SOLUTION INTRAVENOUS PRN
Status: DISCONTINUED | OUTPATIENT
Start: 2023-05-23 | End: 2023-05-25 | Stop reason: HOSPADM

## 2023-05-23 RX ORDER — ONDANSETRON 4 MG/1
4 TABLET, ORALLY DISINTEGRATING ORAL EVERY 8 HOURS PRN
Status: DISCONTINUED | OUTPATIENT
Start: 2023-05-23 | End: 2023-05-25 | Stop reason: HOSPADM

## 2023-05-23 RX ORDER — SODIUM CHLORIDE 0.9 % (FLUSH) 0.9 %
5-40 SYRINGE (ML) INJECTION EVERY 12 HOURS SCHEDULED
Status: DISCONTINUED | OUTPATIENT
Start: 2023-05-23 | End: 2023-05-25 | Stop reason: HOSPADM

## 2023-05-23 RX ORDER — LEVETIRACETAM 500 MG/1
500 TABLET ORAL 2 TIMES DAILY
Status: DISCONTINUED | OUTPATIENT
Start: 2023-05-23 | End: 2023-05-25 | Stop reason: HOSPADM

## 2023-05-23 RX ORDER — SODIUM CHLORIDE 0.9 % (FLUSH) 0.9 %
5-40 SYRINGE (ML) INJECTION PRN
Status: DISCONTINUED | OUTPATIENT
Start: 2023-05-23 | End: 2023-05-25 | Stop reason: HOSPADM

## 2023-05-23 RX ORDER — ACETAMINOPHEN 650 MG/1
650 SUPPOSITORY RECTAL EVERY 6 HOURS PRN
Status: DISCONTINUED | OUTPATIENT
Start: 2023-05-23 | End: 2023-05-25 | Stop reason: HOSPADM

## 2023-05-23 RX ORDER — LEVETIRACETAM 500 MG/1
500 TABLET ORAL ONCE
Status: COMPLETED | OUTPATIENT
Start: 2023-05-23 | End: 2023-05-23

## 2023-05-23 RX ORDER — LEVETIRACETAM 10 MG/ML
1000 INJECTION INTRAVASCULAR ONCE
Status: COMPLETED | OUTPATIENT
Start: 2023-05-23 | End: 2023-05-23

## 2023-05-23 RX ORDER — LORAZEPAM 2 MG/ML
1 INJECTION INTRAMUSCULAR EVERY 6 HOURS PRN
Status: DISCONTINUED | OUTPATIENT
Start: 2023-05-23 | End: 2023-05-25 | Stop reason: HOSPADM

## 2023-05-23 RX ORDER — ONDANSETRON 2 MG/ML
4 INJECTION INTRAMUSCULAR; INTRAVENOUS EVERY 6 HOURS PRN
Status: DISCONTINUED | OUTPATIENT
Start: 2023-05-23 | End: 2023-05-25 | Stop reason: HOSPADM

## 2023-05-23 RX ADMIN — LAMOTRIGINE 125 MG: 150 TABLET ORAL at 23:35

## 2023-05-23 RX ADMIN — LEVETIRACETAM 500 MG: 500 TABLET, FILM COATED ORAL at 16:38

## 2023-05-23 RX ADMIN — LEVETIRACETAM 500 MG: 500 TABLET, FILM COATED ORAL at 23:34

## 2023-05-23 RX ADMIN — LORAZEPAM 2 MG: 2 INJECTION INTRAMUSCULAR at 17:12

## 2023-05-23 RX ADMIN — LORAZEPAM 2 MG: 2 INJECTION INTRAMUSCULAR; INTRAVENOUS at 17:12

## 2023-05-23 RX ADMIN — LEVETIRACETAM 1000 MG: 10 INJECTION, SOLUTION INTRAVENOUS at 17:41

## 2023-05-23 RX ADMIN — SODIUM CHLORIDE, PRESERVATIVE FREE 10 ML: 5 INJECTION INTRAVENOUS at 23:38

## 2023-05-23 ASSESSMENT — ENCOUNTER SYMPTOMS
COUGH: 0
CONSTIPATION: 0
VOMITING: 0
NAUSEA: 0
EYE PAIN: 0
SORE THROAT: 0
ABDOMINAL PAIN: 0
DIARRHEA: 0
SHORTNESS OF BREATH: 0
RHINORRHEA: 0
BACK PAIN: 0

## 2023-05-23 ASSESSMENT — PAIN - FUNCTIONAL ASSESSMENT: PAIN_FUNCTIONAL_ASSESSMENT: NONE - DENIES PAIN

## 2023-05-23 ASSESSMENT — LIFESTYLE VARIABLES: HOW OFTEN DO YOU HAVE A DRINK CONTAINING ALCOHOL: NEVER

## 2023-05-23 NOTE — ED PROVIDER NOTES
In addition to the advanced practice provider, I personally saw Margaret Russell and performed a substantive portion of the visit including all aspects of the medical decision making. Medical Decision Making    Seizure disorder hx , had witnessed seizure activity today about 4 mins  Arrived confused with improvement in mentation in the ER. No head trauma   Has adequate supply of Lamictal and Keppra at home. Follows with Dr. Silvia Silva with neurology. No recent illness. Family Adamant that he has not missed doses of his antiepileptics. On exam pt is resting comfortably  Sleepy but arousable to verbal stimuli. Answering questions and following commands appropriately. No facial drooping. EOM intact, pupils ERRL  No drift all 4 extremities  No nuchal rigidity     The Ekg interpreted by me in the absence of a cardiologist shows. normal sinus rhythm with a rate of 71  Axis is   Normal  QTc is  normal  Intervals and Durations are unremarkable. No specific ST-T wave changes appreciated. No evidence of acute ischemia. No prior for comparison         SEP-1  Is this patient to be included in the SEP-1 Core Measure due to severe sepsis or septic shock? No   Exclusion criteria - the patient is NOT to be included for SEP-1 Core Measure due to:   Infection is not suspected    Screenings     [place] Coma Scale  Eye Opening: Spontaneous  Best Verbal Response: Confused  Best Motor Response: Obeys commands  [place] Coma Scale Score: 14           No orders to display     Labs Reviewed   COMPREHENSIVE METABOLIC PANEL W/ REFLEX TO MG FOR LOW K - Abnormal; Notable for the following components:       Result Value    Glucose 106 (*)     All other components within normal limits   CBC WITH AUTO DIFFERENTIAL   LEVETIRACETAM LEVEL   LAMOTRIGINE LEVEL     Medications   levETIRAcetam (KEPPRA) 1000 mg/100 mL IVPB (1,000 mg IntraVENous New Bag 5/23/23 4990)   levETIRAcetam (KEPPRA) tablet 500 mg (500 mg Oral Given 5/23/23 1638)
medications on file       DISCONTINUED MEDICATIONS:  Discontinued Medications    No medications on file              (Please note that portions of this note were completed with a voice recognition program.  Efforts were made to edit the dictations but occasionally words are mis-transcribed.)    MIKE Felder (electronically signed)            MIKE Felder  05/23/23 2674

## 2023-05-24 LAB
EKG ATRIAL RATE: 71 BPM
EKG DIAGNOSIS: NORMAL
EKG P AXIS: 53 DEGREES
EKG P-R INTERVAL: 174 MS
EKG Q-T INTERVAL: 374 MS
EKG QRS DURATION: 96 MS
EKG QTC CALCULATION (BAZETT): 406 MS
EKG R AXIS: 64 DEGREES
EKG T AXIS: 9 DEGREES
EKG VENTRICULAR RATE: 71 BPM
LEVETIRACETAM SERPL-MCNC: <2 UG/ML (ref 6–46)
MEDICATION DOSE-MCNC: ABNORMAL

## 2023-05-24 PROCEDURE — APPNB45 APP NON BILLABLE 31-45 MINUTES

## 2023-05-24 PROCEDURE — 6360000002 HC RX W HCPCS: Performed by: INTERNAL MEDICINE

## 2023-05-24 PROCEDURE — 2580000003 HC RX 258: Performed by: INTERNAL MEDICINE

## 2023-05-24 PROCEDURE — 2060000000 HC ICU INTERMEDIATE R&B

## 2023-05-24 PROCEDURE — 6370000000 HC RX 637 (ALT 250 FOR IP): Performed by: INTERNAL MEDICINE

## 2023-05-24 PROCEDURE — 93010 ELECTROCARDIOGRAM REPORT: CPT | Performed by: INTERNAL MEDICINE

## 2023-05-24 RX ADMIN — SODIUM CHLORIDE, PRESERVATIVE FREE 10 ML: 5 INJECTION INTRAVENOUS at 08:51

## 2023-05-24 RX ADMIN — LAMOTRIGINE 125 MG: 150 TABLET ORAL at 08:36

## 2023-05-24 RX ADMIN — LAMOTRIGINE 125 MG: 150 TABLET ORAL at 21:20

## 2023-05-24 RX ADMIN — LEVETIRACETAM 500 MG: 500 TABLET, FILM COATED ORAL at 08:36

## 2023-05-24 RX ADMIN — LEVETIRACETAM 500 MG: 500 TABLET, FILM COATED ORAL at 21:20

## 2023-05-24 RX ADMIN — ACETAMINOPHEN 650 MG: 325 TABLET ORAL at 16:56

## 2023-05-24 RX ADMIN — SODIUM CHLORIDE, PRESERVATIVE FREE 10 ML: 5 INJECTION INTRAVENOUS at 21:21

## 2023-05-24 RX ADMIN — ENOXAPARIN SODIUM 40 MG: 100 INJECTION SUBCUTANEOUS at 08:36

## 2023-05-24 ASSESSMENT — PAIN SCALES - GENERAL
PAINLEVEL_OUTOF10: 2
PAINLEVEL_OUTOF10: 0

## 2023-05-24 ASSESSMENT — PAIN DESCRIPTION - DESCRIPTORS: DESCRIPTORS: ACHING

## 2023-05-24 ASSESSMENT — PAIN - FUNCTIONAL ASSESSMENT: PAIN_FUNCTIONAL_ASSESSMENT: ACTIVITIES ARE NOT PREVENTED

## 2023-05-24 ASSESSMENT — PAIN DESCRIPTION - ORIENTATION: ORIENTATION: RIGHT

## 2023-05-24 ASSESSMENT — PAIN DESCRIPTION - LOCATION: LOCATION: HAND

## 2023-05-24 NOTE — PROGRESS NOTES
NEUROLOGY / NEUROCRITICAL CARE PROGRESS NOTE       Patient Name: Joel Whitaker YOB: 1995   Sex: Male Age: 33 yrs     CC / Reason for Consult: breakthrough seizure, possible noncompliance    Interval Hx / Changes over last 24 hours:   His Keppra level (drawn prior to dose given in ER) was < 2.0. He reports compliance with his medication regimen to me, but reportedly told family while in the ER that he had missed some doses. He is alert, oriented, seems to be back to his baseline      ROS: No complaints    HISTORY   Admission HPI:   Joel Whitaker is a 33 y.o. y/o male with history significant for seizures, developmental delay, and stutter. Per my interview with the patient he remembers doing some schoolwork on his phone, and then waking up in the ED at [facility]. Patient states his last breakthrough siezure was in 2020, and denies missing any doses of his AEDs. Patient also denies any recent illnesses or injury, but states school has been stressful for him lately. Per chart review patient had a seizure lasting ~4 minutes at 1400 on 5/23/23. Patient was brought to [facility] ED via EMS. Patient was still postictal on arrival, but gradually improved while in the ED. While in the ED, patient denied missing any doses of his AEDs. When patient's mother and grandmother arrived, the stated they noticed him staring and drooling with no tremors, tongue biting, or incontinence. Patient's family also states he did not hit his head. Of note, patient admitted to his mother and grandmother that he had missed some doses of his AEDs. A CT head was done showing no acute hemorrhage or midline shift. Lamictal and keppra levels were drawn. Patient then transferred to [facility] PCU for further evaluation and treatment. Patient is seen by Dr. Jef Bravo as an outpatient, and has had seizures since he was an infant. Patient currently taking lamictal 125mg BID, and keppra 500mg BID.

## 2023-05-24 NOTE — CONSULTS
Neurology / Neurocritical Care Consult Note      Hannah Dockery MD is requesting this consult. Reason for Consult: breakthrough seizure, possible noncompliance  Admission Chief Complaint: seizures    History of Present Illness     Esther Lambert is a 33 y.o. y/o male with history significant for seizures, developmental delay, and stutter. Per my interview with the patient he remembers doing some schoolwork on his phone, and then waking up in the ED at [facility]. Patient states his last breakthrough siezure was in 2020, and denies missing any doses of his AEDs. Patient also denies any recent illnesses or injury, but states school has been stressful for him lately. Per chart review patient had a seizure lasting ~4 minutes at 1400 on 5/23/23. Patient was brought to [facility] ED via EMS. Patient was still postictal on arrival, but gradually improved while in the ED. While in the ED, patient denied missing any doses of his AEDs. When patient's mother and grandmother arrived, the stated they noticed him staring and drooling with no tremors, tongue biting, or incontinence. Patient's family also states he did not hit his head. Of note, patient admitted to his mother and grandmother that he had missed some doses of his AEDs. A CT head was done showing no acute hemorrhage or midline shift. Lamictal and keppra levels were drawn. Patient then transferred to [facility] PCU for further evaluation and treatment. Patient is seen by Dr. Pio Benjamin as an outpatient, and has had seizures since he was an infant. Patient currently taking lamictal 125mg BID, and keppra 500mg BID. History provided by:  Patient  Chart review    REVIEW OF SYSTEMS:   Constitutional- No weight loss or fevers   Neurologic- No headache. No focal motor/sensory deficits. C/o blurry vision, but states it is improved from when he arrived in the ED.       Past Medical, Surgical, Family, and Social History   PAST MEDICAL

## 2023-05-24 NOTE — ED NOTES
Attempted to call report to Y[facility], unable to give report at this time.  Receiving nurse states, \" I am pulling meds at this time can you call back in 10 minutes\"      Precious Collier RN  05/23/23 2016

## 2023-05-24 NOTE — DISCHARGE INSTRUCTIONS
Seizure Driving Risk: Having a Seizure while driving puts you at risk for injuring yourself or others. If you drive while having uncontrolled seizures, you may be held liable for injuries to others. Do not drive until you have been seizure free for at least 3 months, state laws vary so please check laws in your state. Injury Risk: Please avoid working from [employer], swimming alone or taking baths in a bathtub, use showers only.

## 2023-05-24 NOTE — PROGRESS NOTES
4 Eyes Skin Assessment     NAME:  Jennifer Salgado  YOB: 1995  MEDICAL RECORD NUMBER:  [id number]    The patient is being assessed for  Admission    I agree that at least one RN has performed a thorough Head to Toe Skin Assessment on the patient. ALL assessment sites listed below have been assessed. Areas assessed by both nurses:    Head, Face, Ears, Shoulders, Back, Chest, Arms, Elbows, Hands, Sacrum. Buttock, Coccyx, Ischium, Legs. Feet and Heels, and Under Medical Devices         Does the Patient have a Wound?  No noted wound(s)       Bronson Prevention initiated by RN: No  Wound Care Orders initiated by RN: No    Pressure Injury (Stage 3,4, Unstageable, DTI, NWPT, and Complex wounds) if present, place Wound referral order by RN under : No    New Ostomies, if present place, Ostomy referral order under : No     Nurse 1 eSignature: Electronically signed by Delfina Greco RN on 5/23/23 at 10:42 PM EDT    **SHARE this note so that the co-signing nurse can place an eSignature**    Nurse 2 eSignature: Electronically signed by Lul Bauer RN on 5/23/23 at 11:25 PM EDT

## 2023-05-24 NOTE — H&P
V2.0  History and Physical      Name:  Dominga Forbes /Age/Sex: 1995  (33 y.o. male)   MRN & CSN:  [id number] & [id number] Encounter Date/Time: 2023 7:25 PM EDT   Location:  [facility] PCP: No primary care provider on file. Hospital Day: 1    Assessment and Plan:   Dominga Forbes is a 33 y.o. male with a pmh of seizure disorder who presents via EMS from home after a witnessed seizure. #Breakthrough seizures  #Known seizure disorder-complex partial epilepsy  #Acute encephalopathy secondary to postictal state, resolved. #Noncompliance with antiseizure medications    Hospital Problems             Last Modified POA    * (Principal) Breakthrough seizure ([address] 2023 Yes     Plan:  Continue on Keppra and Lamictal  Neurochecks every 4 hourly  Seizure and fall precautions  Neurology consult, further work-up per neurology recommendations  Ativan 2 mg IV every 6 hours as needed seizures  Follow Keppra and Lamictal levels  Supportive therapy    Disposition:   Current Living situation: Home  Expected Disposition: Home  Estimated D/C: 1-2 days    Diet ADULT DIET; Regular   DVT Prophylaxis [] Lovenox, []  Heparin, [x] SCDs, [] Ambulation,  [] Eliquis, [] Xarelto, [] Coumadin   Code Status Full Code   Surrogate Decision Maker/ POA Patient     Personally reviewed Lab Studies to include CMP and CBC which are within and Imaging     Discussed management of the case with accepting provider who recommended admission for further work-up    EKG not indicated    No imaging studies to review       History from:     patient, electronic medical record    History of Present Illness:     Chief Complaint: Seizure  Dominga Forbes is a 33 y.o. male with pmh of complex partial epilepsy who presents after a witnessed seizure at home. The seizure had occurred around 2 PM today and patient was brought to ED by EMS.   Following the seizure patient has been somewhat confused, likely due to postictal state from which he was

## 2023-05-24 NOTE — PROGRESS NOTES
V2.0    [facility] [place]      Name:  Neeta Schwab /Age/Sex: 1995  (33 y.o. male)   MRN & CSN:  [id number] & [id number] Encounter Date/Time: 2023 11:52 AM EDT   Location:  [facility]/[id number] PCP: No primary care provider on file. Attending:Sathya Simms MD       Hospital Day: 2    Assessment and Recommendations   Neeta Schwab is a 33 y.o. male with pmh of epilepsySeiz  who presents with Breakthrough seizure [facility])    Assessment  Principal Problem:    Breakthrough seizure ([facility]  Active Problems:    Status epilepticus ([facility] [address] [facility]  Resolved Problems:    * No resolved hospital problems. *       Plan:   Breakthrough seizure: Keppra level less than 2, Lamictal pending raises the concern for noncompliance. Continue with Keppra and Lamictal. Neurology following. Further recs on adjusting antiepileptic meds/further work up      Diet ADULT DIET; Regular   DVT Prophylaxis [x] Lovenox, []  Heparin, [] SCDs, [] Ambulation,  [] Eliquis, [] Xarelto   Code Status Full Code             Personally reviewed Lab Studies and Imaging             Drugs that require monitoring for toxicity include Keppra, Lamictal and the method of monitoring was keppra and Lamictal level. Subjective:       Neeta Schwab is a 33 y.o. male who presents with seizure   Feels well  Denies any cp, sob  Afebrile  No nausea, emesis       Review of Systems:      Pertinent positives and negatives discussed in HPI    Objective:      Intake/Output Summary (Last 24 hours) at 2023 1152  Last data filed at 2023 0418  Gross per 24 hour   Intake 10 ml   Output 0 ml   Net 10 ml      Vitals:   Vitals:    23 0411 23 0415 23 0833 23 1130   BP: 115/82  125/63 122/73   Pulse: 62  61 69   Resp: 16  17 18   Temp: 97.6 °F (36.4 °C)  97.7 °F (36.5 °C) 98 °F (36.7 °C)   TempSrc: Oral  Oral Axillary   SpO2: 98%  99% 99%   Weight:  187 lb 2.7 oz (84.9 kg)     Height:             Physical Exam:      General: NAD  Eyes:

## 2023-05-25 VITALS
OXYGEN SATURATION: 99 % | HEIGHT: 67 IN | SYSTOLIC BLOOD PRESSURE: 116 MMHG | DIASTOLIC BLOOD PRESSURE: 69 MMHG | BODY MASS INDEX: 29.24 KG/M2 | RESPIRATION RATE: 14 BRPM | WEIGHT: 186.29 LBS | TEMPERATURE: 98.2 F | HEART RATE: 63 BPM

## 2023-05-25 LAB
EKG ATRIAL RATE: 60 BPM
EKG DIAGNOSIS: NORMAL
EKG P AXIS: 65 DEGREES
EKG P-R INTERVAL: 170 MS
EKG Q-T INTERVAL: 386 MS
EKG QRS DURATION: 92 MS
EKG QTC CALCULATION (BAZETT): 386 MS
EKG R AXIS: 80 DEGREES
EKG T AXIS: 25 DEGREES
EKG VENTRICULAR RATE: 60 BPM
LAMOTRIGINE SERPL-MCNC: 9.6 UG/ML (ref 3–15)

## 2023-05-25 PROCEDURE — 93005 ELECTROCARDIOGRAM TRACING: CPT | Performed by: NURSE PRACTITIONER

## 2023-05-25 PROCEDURE — 6370000000 HC RX 637 (ALT 250 FOR IP): Performed by: INTERNAL MEDICINE

## 2023-05-25 PROCEDURE — 2580000003 HC RX 258: Performed by: INTERNAL MEDICINE

## 2023-05-25 PROCEDURE — 6360000002 HC RX W HCPCS: Performed by: INTERNAL MEDICINE

## 2023-05-25 RX ADMIN — LEVETIRACETAM 500 MG: 500 TABLET, FILM COATED ORAL at 09:10

## 2023-05-25 RX ADMIN — LAMOTRIGINE 125 MG: 150 TABLET ORAL at 09:09

## 2023-05-25 RX ADMIN — ENOXAPARIN SODIUM 40 MG: 100 INJECTION SUBCUTANEOUS at 09:10

## 2023-05-25 RX ADMIN — SODIUM CHLORIDE, PRESERVATIVE FREE 10 ML: 5 INJECTION INTRAVENOUS at 09:15

## 2023-05-25 ASSESSMENT — PAIN DESCRIPTION - PAIN TYPE: TYPE: ACUTE PAIN

## 2023-05-25 ASSESSMENT — PAIN - FUNCTIONAL ASSESSMENT: PAIN_FUNCTIONAL_ASSESSMENT: ACTIVITIES ARE NOT PREVENTED

## 2023-05-25 ASSESSMENT — PAIN DESCRIPTION - ORIENTATION: ORIENTATION: MID

## 2023-05-25 ASSESSMENT — PAIN SCALES - GENERAL
PAINLEVEL_OUTOF10: 0

## 2023-05-25 ASSESSMENT — PAIN DESCRIPTION - ONSET: ONSET: ON-GOING

## 2023-05-25 ASSESSMENT — PAIN DESCRIPTION - FREQUENCY: FREQUENCY: CONTINUOUS

## 2023-05-25 ASSESSMENT — PAIN DESCRIPTION - DESCRIPTORS: DESCRIPTORS: PATIENT UNABLE TO DESCRIBE

## 2023-05-25 ASSESSMENT — PAIN DESCRIPTION - LOCATION: LOCATION: CHEST

## 2023-05-25 NOTE — CARE COORDINATION
CM spoke with pt at bedside regarding discharge planning. Pt lives at home with family. States he is independent and at his baseline mobility. Denies any CM needs upon discharge. States he will have ride home. Denies any barriers to getting medications- states he just forgot to take a dose.      Thank you  Cat Hemalatha Anna RN, BSN, [address]  PCU   [phone]

## 2023-05-25 NOTE — PLAN OF CARE
Problem: Discharge Planning  Goal: Discharge to home or other facility with appropriate resources  5/25/2023 0941 by Kerwin Cm RN  Outcome: Progressing  5/25/2023 0257 by Conchis Valdivia RN  Outcome: Progressing     Problem: Safety - Adult  Goal: Free from fall injury  5/25/2023 0941 by Kerwin Cm RN  Outcome: Progressing  Flowsheets (Taken 5/25/2023 0941)  Free From Fall Injury: Instruct family/caregiver on patient safety  Note: Patient educated on patient safety and requesting assistance via call bell. Seizure precautions in place. 5/25/2023 0257 by Conchis Valdivia RN  Outcome: Progressing     Problem: Pain  Goal: Verbalizes/displays adequate comfort level or baseline comfort level  5/25/2023 0941 by Kerwin Cm RN  Outcome: Progressing  Flowsheets (Taken 5/25/2023 0720)  Verbalizes/displays adequate comfort level or baseline comfort level:   Assess pain using appropriate pain scale   Encourage patient to monitor pain and request assistance   Implement non-pharmacological measures as appropriate and evaluate response   Consider cultural and social influences on pain and pain management  Note: Denies pain at this time, educated on monitoring pain and requesting assistance. 10 minutes bedside  5/25/2023 0257 by Conchis Valdivia RN  Outcome: Progressing     Problem: ABCDS Injury Assessment  Goal: Absence of physical injury  5/25/2023 0941 by Kerwin Cm RN  Outcome: Progressing  Flowsheets (Taken 5/25/2023 3062)  Absence of Physical Injury: Implement safety measures based on patient assessment  Note: Patient advised of regular turns and call light awareness for safety.    5/25/2023 0257 by Conchis Valdivia RN  Outcome: Progressing  Flowsheets (Taken 5/25/2023 0257)  Absence of Physical Injury: Implement safety measures based on patient assessment

## 2023-05-25 NOTE — PROGRESS NOTES
AEDs to his mother and grandmother while at [facility] ED. -Given lack of fever, leukocytosis, electrolyte abnormality, other metabolic derangement, and an unremarkable CT head breakthrough is likely due to non compliance. His last breakthrough according to his Neurologist's notes was a few years ago and due to non compliance.     -When asking the patient today about what seizure medications he takes, when and how much, he was not able to accurately tell me his doses and times. He states he takes them once a day when he should be taking them twice a day. Given the patient developmental delay and history of non compliance, it will be paramount to ensure the patient is living situation where he can be reminded to take his seizure medications as prescribed. This was dicussed at length with him today.    -He states he does not drive. He told me his neurologist told him they can revisit driving privileges in two years. We discussed the dangers of driving with a seizure disorder that is not well controlled along with other dangers such as bathing, swimming, being at heights unrestrained and caring for small children or infants alone. Plan:  -Okay to discharge home today on home AED regimen of Keppra 500mg BID and Lamictal 125mg BID.   -Follow up with Dr. Geetha Michelle as an outpatient  -Discussed seizure precautions and driving precautions     MARION Cantrell - CNP   Neurology & Neurocritical Care   5/25/2023 6:37 PM

## 2023-05-25 NOTE — DISCHARGE SUMMARY
Discharge Summary    Date:5/25/2023        Patient Hiram Sweeney     YOB: 1995     Age:27 y.o. Admit Date:5/23/2023   Admission Condition:fair   Discharged Condition:good  Discharge Date: 05/25/23    Discharge Diagnoses   Principal Problem:    Breakthrough seizure [facility])  Active Problems:    Status epilepticus ([address]  Resolved Problems:    * No resolved hospital problems. [facility] AND [facility] Stay   Narrative of Hospital Course:     Seizure  Suspect related to non compliance per neuro  Keppra level low  Per Neurology no further work up needed  Dc on Keppra, and lamictal home dose. Discussed with patient, he has plenty of dosages left. On dc Patient denies any CP,SOB,Denies any nausea, vomiting, abdominal pain. Denies any diarrhea. Patient denies any palpitations, lightheadedness, orthopnea, PND. Patient doesn't appear to be in any distress on discharge . Physical exam   Vitals:    05/24/23 2313 05/25/23 0400 05/25/23 0600 05/25/23 0717   BP: 132/77 132/87  114/82   Pulse: 86 53  76   Resp: 17 16  16   Temp: 98.2 °F (36.8 °C) 98 °F (36.7 °C)  98.7 °F (37.1 °C)   TempSrc: Oral Oral  Oral   SpO2: 98% 98%  98%   Weight:   186 lb 4.6 oz (84.5 kg)    Height:             Physical Exam  Constitutional:       Appearance: Normal appearance. Cardiovascular:      Rate and Rhythm: Normal rate and regular rhythm. Pulses: Normal pulses. Heart sounds: Normal heart sounds. Pulmonary:      Effort: Pulmonary effort is normal.      Breath sounds: Normal breath sounds. Musculoskeletal:         General: Normal range of motion. Skin:     General: Skin is warm and dry. Capillary Refill: Capillary refill takes less than 2 seconds. Neurological:      General: No focal deficit present. Mental Status: He is alert. Mental status is at baseline.    Psychiatric:         Mood and Affect: Mood normal.         Behavior: Behavior normal.         Consultants:   IP CONSULT TO NEUROLOGY    Time

## 2023-05-26 ENCOUNTER — CARE COORDINATION (OUTPATIENT)
Dept: CASE MANAGEMENT | Age: 28
End: 2023-05-26

## 2023-05-26 DIAGNOSIS — G40.901 NONINTRACTABLE EPILEPSY WITH STATUS EPILEPTICUS, UNSPECIFIED EPILEPSY TYPE (HCC): Primary | ICD-10-CM

## 2023-05-26 NOTE — CARE COORDINATION
[facility] Care Transitions Initial Follow Up Call    Call within 2 business days of discharge: Yes    Patient Current Location:  Home: [address] [place] [id number]    Care Transition Nurse contacted the patient by telephone to perform post hospital discharge assessment. Verified name and  with patient as identifiers. Provided introduction to self, and explanation of the Care Transition Nurse role. Patient: Neeta Schwba Patient : 1995   MRN: [id number]   Reason for Admission: Status Epilepsy   Discharge Date: 23 RARS: Readmission Risk Score: 2.9      Last Discharge  [address]       Date Complaint Diagnosis Description Type Department Provider    23   Admission (Discharged) [facility] 4 PCU Crys Burroughs MD    23 Seizures Seizure secondary to subtherapeutic anticonvulsant medication ([facility] [address] . .. ED (TRANSFER) [facility] ED Matt Laguna MD            Was this an external facility discharge? No     Challenges to be reviewed by the provider   Additional needs identified to be addressed with provider: No  none               Method of communication with provider: none. CTN spoke with patient this am for initial 24 hour discharge follow up CTN call. Patient states he is doing well, no more seizure like activity. Patient with no reports of any falls, no fever, chills, nausea, vomiting, chest pain, SOB or cough. Patient with no congestion, pain, difficulty emptying bladder, LE edema, feeling lightheaded, dizziness, and heart palpitations. CTN and Pt reviewed meds and CTN completed the 1111f. Care Transition Nurse reviewed discharge instructions, medical action plan, and red flags with patient who verbalized understanding. The patient was given an opportunity to ask questions and does not have any further questions or concerns at this time. Were discharge instructions available to patient? Yes.  Reviewed appropriate site of care based on symptoms and resources available to patient

## 2023-06-02 ENCOUNTER — CARE COORDINATION (OUTPATIENT)
Dept: CASE MANAGEMENT | Age: 28
End: 2023-06-02

## 2023-06-02 NOTE — CARE COORDINATION
Patients top risk factors for readmission: medical condition-Status Epilepsy  Interventions to address risk factors: Education of patient/family/caregiver/guardian to support self-management-Patient instructed to continue to monitor for any returning signs of seizure, making sure to take all medications as prescribed. Care Transitions Subsequent and Final Call    Schedule Follow Up Appointment with PCP: Declined  Subsequent and Final Calls  Do you have any ongoing symptoms?: No  Have your medications changed?: No  Do you have any questions related to your medications?: No  Do you currently have any active services?: No  Do you have any needs or concerns that I can assist you with?: No  Identified Barriers: None  Care Transitions Interventions  No Identified Needs  Other Interventions:           Care Transition Nurse provided contact information for future needs. Plan for follow-up call in 5-7 days based on severity of symptoms and risk factors. Plan for next call: Any issues or concerns that may arise.     Thank Sohan Valencia RN  Care Transition Coordinator  Contact [facility]:[phone]

## 2023-06-09 ENCOUNTER — CARE COORDINATION (OUTPATIENT)
Dept: CASE MANAGEMENT | Age: 28
End: 2023-06-09

## 2023-06-09 NOTE — CARE COORDINATION
[facility] Care Transitions Follow Up Call    Patient Current Location:    Home: [address] [place] [address]  [address] [facility] [address]    Care Transition Nurse contacted the patient by telephone to perform post hospital discharge assessment. Verified name with patient as identifier. Provided introduction to self, and explanation of the Care Transition Nurse role. Patient: Joann Cox Patient :  1995  MRN: [id number]  Reason for Admission:  seizure  Discharge Date:    RARS: 3    Challenges to be reviewed by the provider   Additional needs identified to be addressed with provider:    no           Method of communication with provider : none      SUMMARY  CTN spoke to the Pt who reports the following:      -Spoke with Selma with Dr Yu Rivas office and assisted pt with obtaining f/u appt. Appt for  rescheduled for  and pt agreeable. -Per ER note pt had stopped taking seizure meds unbeknownst to mother & grandmother and had seizure on . Seizure since d/c \"No\" - taking seizure meds as prescribed \"Yes\"  -Denies issues with fluid intake, appetite, urination, and bowel habits. From CDC: Are you at higher risk for severe illness? Wash your hands often. Avoid close contact (6 feet, which is about two arm lengths) w/people who are sick. Put distance between yourself and other people if COVID-19 is spreading in your community. Clean and disinfect frequently touched surfaces. Avoid all cruise travel and non-essential air travel. Call your healthcare professional if you have concerns about COVID-19 and your underlying condition or if you are sick. Pt will take all meds as prescribed and schedule / keep doctor's appt. [facility] provided education on s/s that require medical attention and when to seek medical attention. [facility] advised Pt of use urgent care or physician's 24 hr access line if assistance is needed after hours or on the weekend.   Patient denies any needs or concerns and is agreeable with

## 2023-06-21 ENCOUNTER — OFFICE VISIT (OUTPATIENT)
Dept: NEUROLOGY | Age: 28
End: 2023-06-21
Payer: COMMERCIAL

## 2023-06-21 VITALS
SYSTOLIC BLOOD PRESSURE: 121 MMHG | HEIGHT: 67 IN | BODY MASS INDEX: 29.82 KG/M2 | HEART RATE: 79 BPM | WEIGHT: 190 LBS | DIASTOLIC BLOOD PRESSURE: 74 MMHG

## 2023-06-21 DIAGNOSIS — G40.209 COMPLEX PARTIAL EPILEPSY WITH GENERALIZATION AND WITH NONINTRACTABLE EPILEPSY (HCC): Primary | ICD-10-CM

## 2023-06-21 DIAGNOSIS — G40.209 COMPLEX PARTIAL EPILEPSY WITH GENERALIZATION AND WITH NONINTRACTABLE EPILEPSY (HCC): ICD-10-CM

## 2023-06-21 DIAGNOSIS — R62.50 DEVELOPMENTAL DELAY: ICD-10-CM

## 2023-06-21 DIAGNOSIS — G40.919 BREAKTHROUGH SEIZURE (HCC): ICD-10-CM

## 2023-06-21 LAB
LEVETIRACETAM SERPL-MCNC: 11 UG/ML (ref 6–46)
MEDICATION DOSE-MCNC: NORMAL

## 2023-06-21 PROCEDURE — G8417 CALC BMI ABV UP PARAM F/U: HCPCS | Performed by: PSYCHIATRY & NEUROLOGY

## 2023-06-21 PROCEDURE — 99214 OFFICE O/P EST MOD 30 MIN: CPT | Performed by: PSYCHIATRY & NEUROLOGY

## 2023-06-21 PROCEDURE — G8427 DOCREV CUR MEDS BY ELIG CLIN: HCPCS | Performed by: PSYCHIATRY & NEUROLOGY

## 2023-06-21 PROCEDURE — 1111F DSCHRG MED/CURRENT MED MERGE: CPT | Performed by: PSYCHIATRY & NEUROLOGY

## 2023-06-21 PROCEDURE — 1036F TOBACCO NON-USER: CPT | Performed by: PSYCHIATRY & NEUROLOGY

## 2023-06-21 RX ORDER — LEVETIRACETAM 500 MG/1
TABLET ORAL
Qty: 180 TABLET | Refills: 1 | Status: CANCELLED | OUTPATIENT
Start: 2023-06-21

## 2023-06-21 RX ORDER — LAMOTRIGINE 25 MG/1
TABLET ORAL
Qty: 900 TABLET | Refills: 1 | Status: CANCELLED | OUTPATIENT
Start: 2023-06-21

## 2023-06-21 NOTE — PROGRESS NOTES
Housing Stability: Unknown    Unstable Housing in the Last Year: No        Objective:  Exam:  /74   Pulse 79   Ht 5' 7\" (1.702 m)   Wt 190 lb (86.2 kg)   BMI 29.76 kg/m²   This is a well-nourished patient in no acute distress  Patient is awake, alert and oriented x3. Speech is normal.  Pupils are equal round reacting to light. Extraocular movements intact. Face symmetrical. Tongue midline. Motor exam shows normal symmetrical strength. Deep tendon reflexes normal. Plantar reflexes downgoing. Sensory exam normal. Coordination normal. Gait normal. No carotid bruit. No neck stiffness.       Data :  LABS:  General Labs:    CBC:   Lab Results   Component Value Date/Time    WBC 4.7 05/23/2023 02:59 PM    RBC 4.93 05/23/2023 02:59 PM    HGB 15.1 05/23/2023 02:59 PM    HCT 45.0 05/23/2023 02:59 PM    MCV 91.2 05/23/2023 02:59 PM    MCH 30.7 05/23/2023 02:59 PM    MCHC 33.6 05/23/2023 02:59 PM    RDW 12.5 05/23/2023 02:59 PM     05/23/2023 02:59 PM    MPV 7.7 05/23/2023 02:59 PM     BMP:    Lab Results   Component Value Date/Time     05/23/2023 02:59 PM    K 4.2 05/23/2023 02:59 PM     05/23/2023 02:59 PM    CO2 27 05/23/2023 02:59 PM    BUN 13 05/23/2023 02:59 PM    LABALBU 4.4 05/23/2023 02:59 PM    CREATININE 1.1 05/23/2023 02:59 PM    CALCIUM 9.1 05/23/2023 02:59 PM    GFRAA >60 08/15/2021 07:04 PM    LABGLOM >60 05/23/2023 02:59 PM    GLUCOSE 106 05/23/2023 02:59 PM       Impression :  Partial complex seizures, usually well controlled as long as he takes his medication regularly  Patient had a breakthrough seizure and his Keppra level was less than 2 (probably undetectable)  Developmental delay  EEG done at [facility] in 2011 was abnormal with bursts of spikes and polyspike in the bilateral frontal regions with diffuse spread  MRI brain done at [facility] in 2009 was normal      Plan :  Discussed with patient   Continue same dose of Keppra as well as lamotrigine  Stressed

## 2023-06-23 ENCOUNTER — CARE COORDINATION (OUTPATIENT)
Dept: CASE MANAGEMENT | Age: 28
End: 2023-06-23

## 2023-06-23 LAB — LAMOTRIGINE SERPL-MCNC: 16.1 UG/ML (ref 3–15)

## 2023-06-23 NOTE — CARE COORDINATION
[facility] Transitions Initial Follow Up Call    Patient:  Sampson Loco Patient :  1995  MRN:  [id number]   Reason for Admission:  seizure   Discharge Date:  23  RARS: 3      Transitions of Care Initial Call    Was this an external facility discharge? no    Discharge Facility: [address] [place] [address] to be reviewed by the provider   Additional needs identified to be addressed with provider:    ferny    AMB CC Provider Discharge Needs: none            CTC attempt to reach Pt regarding recent hospital discharge. CTC left voice recording with call back number requesting a call back. Follow up appointments:    Future Appointments   Date Time Provider Surinder Aquino   2023  2:00 PM MD Lizbeth Castroe 429 Neurology -   10/2/2023 10:20 AM MARION Price - CNP [facility] RAMON Castellanos, RN  Care Transition Coordinator  Contact Number:  [phone]

## 2023-06-28 NOTE — RESULT ENCOUNTER NOTE
Spoke to pt. He did take the medication the same day. Pt advised to have labs repeated in one month. Pt asked for us to send a letter outlining the request so he doesn't forget.

## 2023-06-30 DIAGNOSIS — G40.209 COMPLEX PARTIAL EPILEPSY WITH GENERALIZATION AND WITH NONINTRACTABLE EPILEPSY (HCC): Primary | ICD-10-CM

## 2023-07-20 ENCOUNTER — HOSPITAL ENCOUNTER (OUTPATIENT)
Age: 28
Discharge: HOME | End: 2023-07-20
Payer: COMMERCIAL

## 2023-07-20 DIAGNOSIS — G40.209 COMPLEX PARTIAL EPILEPSY WITH GENERALIZATION AND WITH NONINTRACTABLE EPILEPSY (HCC): ICD-10-CM

## 2023-07-20 PROCEDURE — 80175 DRUG SCREEN QUAN LAMOTRIGINE: CPT

## 2023-07-21 LAB — LAMOTRIGINE SERPL-MCNC: 17.8 UG/ML (ref 3–15)

## 2023-08-24 DIAGNOSIS — G40.209 COMPLEX PARTIAL EPILEPSY WITH GENERALIZATION AND WITH NONINTRACTABLE EPILEPSY (HCC): ICD-10-CM

## 2023-08-28 RX ORDER — LEVETIRACETAM 500 MG/1
TABLET ORAL
Qty: 180 TABLET | Refills: 0 | Status: SHIPPED | OUTPATIENT
Start: 2023-08-28

## 2023-08-28 RX ORDER — LAMOTRIGINE 25 MG/1
TABLET ORAL
Qty: 900 TABLET | Refills: 0 | Status: SHIPPED | OUTPATIENT
Start: 2023-08-28

## 2023-09-20 ENCOUNTER — OFFICE VISIT (OUTPATIENT)
Dept: NEUROLOGY | Age: 28
End: 2023-09-20
Payer: COMMERCIAL

## 2023-09-20 VITALS
SYSTOLIC BLOOD PRESSURE: 121 MMHG | DIASTOLIC BLOOD PRESSURE: 70 MMHG | HEART RATE: 80 BPM | WEIGHT: 194 LBS | BODY MASS INDEX: 30.38 KG/M2

## 2023-09-20 DIAGNOSIS — G40.209 COMPLEX PARTIAL EPILEPSY WITH GENERALIZATION AND WITH NONINTRACTABLE EPILEPSY (HCC): Primary | ICD-10-CM

## 2023-09-20 DIAGNOSIS — R62.50 DEVELOPMENTAL DELAY: ICD-10-CM

## 2023-09-20 PROCEDURE — 1036F TOBACCO NON-USER: CPT | Performed by: PSYCHIATRY & NEUROLOGY

## 2023-09-20 PROCEDURE — 99213 OFFICE O/P EST LOW 20 MIN: CPT | Performed by: PSYCHIATRY & NEUROLOGY

## 2023-09-20 PROCEDURE — G8417 CALC BMI ABV UP PARAM F/U: HCPCS | Performed by: PSYCHIATRY & NEUROLOGY

## 2023-09-20 PROCEDURE — G8427 DOCREV CUR MEDS BY ELIG CLIN: HCPCS | Performed by: PSYCHIATRY & NEUROLOGY

## 2023-09-20 NOTE — PROGRESS NOTES
Karolina Hinds   Neurology followup    Subjective:   CC/HP  History was obtained from the patient. Patient has not had any further seizures since her last office visit. He has been taking his medications regularly. His levels were therapeutic. His lamotrigine level was slightly on the high side because the lab was done just a few hours after he took medication. Detailed history:  Patient has had seizures ever since was a infant. She was initially seen at [facility] [place] for epilepsy. Patient had been on several medications. Patient has a history of developmental delay. Patient is and was seen by Dr. Alyssa Devries but now because of insurance reasons he could not see them anymore. Patient started missing medications since he was not seeing his neurologist and would have breakthrough seizures. Patient was referred here for further evaluation. The last seizure was a couple of months ago. Seizure description: Patient would get a strange look on his face and then have trembling or jerking of his arms and legs. She sometimes with a tongue or lip biting. He had other times would have bladder or bowel incontinence. He would be postictal and then come back to normal.    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   Seizures

## 2023-10-01 NOTE — PROGRESS NOTES
History and Physical      hSaq Rodriguez  YOB: 1995    Date of Service:  10/2/2023    Chief Complaint:   Shaq Rodriguez is a 33 y.o. male who presents for complete physical examination.     HPI:     [address]   DECLINED COVID- FLU VACCINE  SEIZURE DISORDER  STABLE  [employer] [facility]  AND LAMICTAL   LAST SEIZURE FOUR YEARS AGO  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 ([address])    Developmental delay    Stuttering    Class 2 obesity due to excess calories with body mass index (BMI) of 35.0 to 35.9 in adult    Breakthrough seizure ([address])    Status epilepticus ([address])       Preventive Care:  Health Maintenance   Topic Date Due    COVID-19 Vaccine (1) Never done    Hepatitis C screen  Never done    Flu vaccine (1) 08/01/2023    Depression Screen  06/14/2024    DTaP/Tdap/Td vaccine (8 - Td or Tdap) 09/29/2032    Hepatitis B vaccine  Completed    Hib vaccine  Completed    HPV vaccine  Completed    Varicella vaccine  Completed    HIV screen  Completed    Hepatitis A vaccine  Aged Out    Meningococcal (ACWY) vaccine  Aged Out    Pneumococcal 0-64 years Vaccine  Aged Out      Self-testicular exams: No  Sexual activity: none   Last eye exam: 10/22, abnormal - NOT SURE OF DIAGNOSIS  Exercise: walks 1 time(s) per day  Seatbelt use: YES  Lipid panel:    Lab Results   Component Value Date    CHOL 218 (H) 10/07/2022    TRIG 43 10/07/2022    HDL 56 10/07/2022    LDLCALC 153 (H) 10/07/2022       Living will: no,   Patient declines ACP discussion/assistance    Immunization History   Administered Date(s) Administered    DT (pediatric) 06/25/2002    DTP 01/02/1997,

## 2023-10-02 ENCOUNTER — OFFICE VISIT (OUTPATIENT)
Dept: FAMILY MEDICINE CLINIC | Age: 28
End: 2023-10-02
Payer: COMMERCIAL

## 2023-10-02 VITALS
OXYGEN SATURATION: 99 % | BODY MASS INDEX: 29.82 KG/M2 | HEIGHT: 67 IN | WEIGHT: 190 LBS | HEART RATE: 76 BPM | DIASTOLIC BLOOD PRESSURE: 72 MMHG | SYSTOLIC BLOOD PRESSURE: 116 MMHG

## 2023-10-02 DIAGNOSIS — Z13.1 DIABETES MELLITUS SCREENING: ICD-10-CM

## 2023-10-02 DIAGNOSIS — G40.209 COMPLEX PARTIAL EPILEPSY WITH GENERALIZATION AND WITH NONINTRACTABLE EPILEPSY (HCC): Chronic | ICD-10-CM

## 2023-10-02 DIAGNOSIS — Z00.00 ANNUAL PHYSICAL EXAM: Primary | ICD-10-CM

## 2023-10-02 DIAGNOSIS — E78.00 HYPERCHOLESTEROLEMIA: ICD-10-CM

## 2023-10-02 LAB
ALBUMIN SERPL-MCNC: 4.7 G/DL (ref 3.4–5)
ALBUMIN/GLOB SERPL: 1.7 {RATIO} (ref 1.1–2.2)
ALP SERPL-CCNC: 86 U/L (ref 40–129)
ALT SERPL-CCNC: 17 U/L (ref 10–40)
ANION GAP SERPL CALCULATED.3IONS-SCNC: 10 MMOL/L (ref 3–16)
AST SERPL-CCNC: 26 U/L (ref 15–37)
BILIRUB SERPL-MCNC: 0.3 MG/DL (ref 0–1)
BUN SERPL-MCNC: 18 MG/DL (ref 7–20)
CALCIUM SERPL-MCNC: 9.7 MG/DL (ref 8.3–10.6)
CHLORIDE SERPL-SCNC: 105 MMOL/L (ref 99–110)
CHOLEST SERPL-MCNC: 235 MG/DL (ref 0–199)
CO2 SERPL-SCNC: 28 MMOL/L (ref 21–32)
CREAT SERPL-MCNC: 1.2 MG/DL (ref 0.9–1.3)
GFR SERPLBLD CREATININE-BSD FMLA CKD-EPI: >60 ML/MIN/{1.73_M2}
GLUCOSE SERPL-MCNC: 83 MG/DL (ref 70–99)
HDLC SERPL-MCNC: 58 MG/DL (ref 40–60)
LDL CHOLESTEROL CALCULATED: 156 MG/DL
POTASSIUM SERPL-SCNC: 3.8 MMOL/L (ref 3.5–5.1)
PROT SERPL-MCNC: 7.5 G/DL (ref 6.4–8.2)
SODIUM SERPL-SCNC: 143 MMOL/L (ref 136–145)
TRIGL SERPL-MCNC: 105 MG/DL (ref 0–150)
VLDLC SERPL CALC-MCNC: 21 MG/DL

## 2023-10-02 PROCEDURE — 36415 COLL VENOUS BLD VENIPUNCTURE: CPT | Performed by: NURSE PRACTITIONER

## 2023-10-02 PROCEDURE — G8484 FLU IMMUNIZE NO ADMIN: HCPCS | Performed by: NURSE PRACTITIONER

## 2023-10-02 PROCEDURE — 99395 PREV VISIT EST AGE 18-39: CPT | Performed by: NURSE PRACTITIONER

## 2023-10-02 ASSESSMENT — PATIENT HEALTH QUESTIONNAIRE - PHQ9
1. LITTLE INTEREST OR PLEASURE IN DOING THINGS: 0
SUM OF ALL RESPONSES TO PHQ QUESTIONS 1-9: 0
SUM OF ALL RESPONSES TO PHQ9 QUESTIONS 1 & 2: 0
SUM OF ALL RESPONSES TO PHQ QUESTIONS 1-9: 0
2. FEELING DOWN, DEPRESSED OR HOPELESS: 0

## 2023-10-03 ENCOUNTER — TELEPHONE (OUTPATIENT)
Dept: FAMILY MEDICINE CLINIC | Age: 28
End: 2023-10-03

## 2023-10-17 DIAGNOSIS — G40.209 COMPLEX PARTIAL EPILEPSY WITH GENERALIZATION AND WITH NONINTRACTABLE EPILEPSY (HCC): ICD-10-CM

## 2023-10-18 RX ORDER — LAMOTRIGINE 25 MG/1
TABLET ORAL
Qty: 900 TABLET | Refills: 1 | Status: SHIPPED | OUTPATIENT
Start: 2023-10-18

## 2023-12-26 ENCOUNTER — APPOINTMENT (OUTPATIENT)
Dept: CT IMAGING | Age: 28
End: 2023-12-26
Payer: COMMERCIAL

## 2023-12-26 ENCOUNTER — APPOINTMENT (OUTPATIENT)
Dept: GENERAL RADIOLOGY | Age: 28
End: 2023-12-26
Payer: COMMERCIAL

## 2023-12-26 ENCOUNTER — HOSPITAL ENCOUNTER (EMERGENCY)
Age: 28
Discharge: HOME | End: 2023-12-26
Attending: EMERGENCY MEDICINE
Payer: COMMERCIAL

## 2023-12-26 VITALS
HEART RATE: 80 BPM | OXYGEN SATURATION: 99 % | DIASTOLIC BLOOD PRESSURE: 80 MMHG | SYSTOLIC BLOOD PRESSURE: 128 MMHG | TEMPERATURE: 98.5 F | RESPIRATION RATE: 18 BRPM

## 2023-12-26 DIAGNOSIS — G40.919 BREAKTHROUGH SEIZURE (HCC): ICD-10-CM
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
## 2023-12-26 DIAGNOSIS — S01.81XA FACIAL LACERATION, INITIAL ENCOUNTER: Primary | ICD-10-CM

## 2023-12-26 DIAGNOSIS — S09.90XA CLOSED HEAD INJURY, INITIAL ENCOUNTER: ICD-10-CM

## 2023-12-26 LAB
ALBUMIN SERPL-MCNC: 4.7 G/DL (ref 3.4–5)
ALBUMIN/GLOB SERPL: 1.5 {RATIO} (ref 1.1–2.2)
ALP SERPL-CCNC: 85 U/L (ref 40–129)
ALT SERPL-CCNC: 18 U/L (ref 10–40)
ANION GAP SERPL CALCULATED.3IONS-SCNC: 11 MMOL/L (ref 3–16)
AST SERPL-CCNC: 40 U/L (ref 15–37)
BASOPHILS # BLD: 0 K/UL (ref 0–0.2)
BASOPHILS NFR BLD: 0.2 %
BILIRUB SERPL-MCNC: <0.2 MG/DL (ref 0–1)
BUN SERPL-MCNC: 12 MG/DL (ref 7–20)
CALCIUM SERPL-MCNC: 9.7 MG/DL (ref 8.3–10.6)
CHLORIDE SERPL-SCNC: 105 MMOL/L (ref 99–110)
CO2 SERPL-SCNC: 25 MMOL/L (ref 21–32)
CREAT SERPL-MCNC: 1 MG/DL (ref 0.9–1.3)
DEPRECATED RDW RBC AUTO: 12.8 % (ref 12.4–15.4)
EOSINOPHIL # BLD: 0 K/UL (ref 0–0.6)
EOSINOPHIL NFR BLD: 0 %
GFR SERPLBLD CREATININE-BSD FMLA CKD-EPI: >60 ML/MIN/{1.73_M2}
GLUCOSE SERPL-MCNC: 105 MG/DL (ref 70–99)
HCT VFR BLD AUTO: 47.5 % (ref 40.5–52.5)
HGB BLD-MCNC: 16.1 G/DL (ref 13.5–17.5)
LEVETIRACETAM SERPL-MCNC: <2 UG/ML (ref 6–46)
LYMPHOCYTES # BLD: 1.5 K/UL (ref 1–5.1)
LYMPHOCYTES NFR BLD: 17.6 %
MCH RBC QN AUTO: 31.3 PG (ref 26–34)
MCHC RBC AUTO-ENTMCNC: 33.8 G/DL (ref 31–36)
MCV RBC AUTO: 92.7 FL (ref 80–100)
MEDICATION DOSE-MCNC: ABNORMAL
MONOCYTES # BLD: 0.5 K/UL (ref 0–1.3)
MONOCYTES NFR BLD: 6 %
NEUTROPHILS # BLD: 6.6 K/UL (ref 1.7–7.7)
NEUTROPHILS NFR BLD: 76.2 %
PLATELET # BLD AUTO: 330 K/UL (ref 135–450)
PMV BLD AUTO: 7.7 FL (ref 5–10.5)
POTASSIUM SERPL-SCNC: 4.1 MMOL/L (ref 3.5–5.1)
PROT SERPL-MCNC: 7.9 G/DL (ref 6.4–8.2)
RBC # BLD AUTO: 5.12 M/UL (ref 4.2–5.9)
SODIUM SERPL-SCNC: 141 MMOL/L (ref 136–145)
TROPONIN, HIGH SENSITIVITY: <6 NG/L (ref 0–22)
WBC # BLD AUTO: 8.7 K/UL (ref 4–11)

## 2023-12-26 PROCEDURE — 71045 X-RAY EXAM CHEST 1 VIEW: CPT

## 2023-12-26 PROCEDURE — 80175 DRUG SCREEN QUAN LAMOTRIGINE: CPT

## 2023-12-26 PROCEDURE — 80053 COMPREHEN METABOLIC PANEL: CPT

## 2023-12-26 PROCEDURE — 70450 CT HEAD/BRAIN W/O DYE: CPT

## 2023-12-26 PROCEDURE — 6360000002 HC RX W HCPCS

## 2023-12-26 PROCEDURE — 12041 INTMD RPR N-HF/GENIT 2.5CM/<: CPT

## 2023-12-26 PROCEDURE — 84484 ASSAY OF TROPONIN QUANT: CPT

## 2023-12-26 PROCEDURE — 93005 ELECTROCARDIOGRAM TRACING: CPT | Performed by: PHYSICIAN ASSISTANT

## 2023-12-26 PROCEDURE — 80177 DRUG SCRN QUAN LEVETIRACETAM: CPT

## 2023-12-26 PROCEDURE — 99285 EMERGENCY DEPT VISIT HI MDM: CPT

## 2023-12-26 PROCEDURE — 85025 COMPLETE CBC W/AUTO DIFF WBC: CPT

## 2023-12-26 RX ORDER — LORAZEPAM 2 MG/ML
INJECTION INTRAMUSCULAR
Status: COMPLETED
Start: 2023-12-26 | End: 2023-12-26

## 2023-12-26 RX ADMIN — LORAZEPAM 2 MG: 2 INJECTION INTRAMUSCULAR; INTRAVENOUS at 13:53

## 2023-12-26 NOTE — PROGRESS NOTES
Patient grandma came out to get staff. States patient began having a seizure. He does have history of seizures. She states he has been taking meds as prescribed but he likely had alcohol yesterday for the holiday and believes he may have missed his dose. Dr. Georgina Elena at bedside. Verbal order for ativan, IV started and medications given.  Blood sent to lab

## 2023-12-26 NOTE — ED PROVIDER NOTES
[facility]        Pt Name: Eugene Ryder  MRN: [id number]  [address] [facility], [place], operated by [facility] 1995  Date of evaluation: 12/26/2023  Provider: Jonatan Nesbitt PA-C  PCP: MARION Lam CNP  Note Started: 3:01 PM EST 12/26/23       I have seen and evaluated this patient with my supervising physician Carlos Foster, [address] [place] [address] [address]       Chief Complaint   Patient presents with    Fall     Patient in with complaints of falling at home. Lac to face. Patient refusing to speak during triage. This appears to be baseline from past visits this RN has seen him        HISTORY OF PRESENT ILLNESS: 1 or more Elements     History from : Family (grandmother) and EMS    Limitations to history : Postictal    Eugene Ryder is a 33 y.o. male with a history of seizure disorder and developmental delay who presents to the emergency department today via ambulance from home after he reportedly had a seizure. He is here with his grandmother who states she heard the patient fall and immediately went into the bathroom and patient had fallen forward into the bathroom and was still having a seizure. She called 911. On arrival to the emergency department, patient had another seizure. He is given Ativan and is postictal at the time of my exam.  I am unable to obtain an HPI and review of systems. He did sustain a laceration to the lateral aspect of his right eyebrow. Grandmother states patient reports he takes his medication for epilepsy. Nursing Notes were all reviewed and agreed with or any disagreements were addressed in the HPI. REVIEW OF SYSTEMS :      Review of Systems   Unable to perform ROS: Other (Patient postictal)   Skin:  Positive for wound. Neurological:  Positive for seizures. Positives and Pertinent negatives as per HPI.      SURGICAL HISTORY     Past Surgical History:   Procedure Laterality Date    TONSILLECTOMY
symptoms. No concern for neurologic emergency. No signs of intracranial bleed. The patient is at low risk for mortality based on demographic, history and clinical factors. Given the best available information and clinical assessment, I estimate the risk of hospitalization to be greater than risk of treatment at home. I have explained to the patient that the risk could rapidly change, given precautions for return and instructions. Explained to patient that the risk for mortality is low based on demographic, history and clinical factors. I discussed with patient the results of evaluation in the ED, diagnosis, care, and prognosis. The plan is to discharge to home. Patient is in agreement with plan and questions have been answered. I also discussed with patient the reasons which may require a return visit and the importance of follow-up care. The patient is well-appearing, nontoxic, and improved at the time of discharge. Patient agrees to call to arrange follow-up care as directed. Patient understands to return immediately for worsening/change in symptoms. I personally saw the patient and independently provided 0 minutes of nonconcurrent critical care out of the total shared critical care time provided    This includes multiple reevaluations, vital sign monitoring, pulse oximetry monitoring, telemetry monitoring, clinical response to the IV medications, reviewing the nursing notes, consultation time, dictation/documentation time, and interpretation of the labwork. (This time excludes time spent performing procedures).               Oralia Escobar MD  12/26/23 8767

## 2023-12-27 ENCOUNTER — TELEPHONE (OUTPATIENT)
Dept: FAMILY MEDICINE CLINIC | Age: 28
End: 2023-12-27

## 2023-12-27 LAB
EKG ATRIAL RATE: 80 BPM
EKG DIAGNOSIS: NORMAL
EKG P AXIS: 57 DEGREES
EKG P-R INTERVAL: 174 MS
EKG Q-T INTERVAL: 370 MS
EKG QRS DURATION: 96 MS
EKG QTC CALCULATION (BAZETT): 426 MS
EKG R AXIS: 58 DEGREES
EKG T AXIS: 23 DEGREES
EKG VENTRICULAR RATE: 80 BPM

## 2023-12-27 PROCEDURE — 93010 ELECTROCARDIOGRAM REPORT: CPT | Performed by: INTERNAL MEDICINE

## 2023-12-27 NOTE — PATIENT INSTRUCTIONS
You may receive a survey regarding the care you received during your visit.  Your input is valuable to us.  We encourage you to complete and return your survey.  We hope you will choose us in the future for your healthcare needs. GENERAL OFFICE POLICIES      Telephone Calls: Messages will be answered within 1-2 business days, unless the provider is out of the office.  If it is urgent a covering provider will answer. (this does not include Medication refills).    MyChart:  We recommend all patients sign up for "[employer]hart.  Through this portal you can see your lab results, request refills, schedule appointments, pay your bill and send messages to the office.   "[employer]hart messages will be answered within 1-2 business days unless the provider is out of the office.  For urgent matters, please call the office.  Appointments:  All appointments must be scheduled.  We ask all patients to schedule their next follow up appointment before they leave the office to make sure you will be able to be seen before you run out of medications.  24 hours notice is required to cancel or reschedule an appointment to avoid being marked as a no show.  You may be dismissed from the practice after 3 no shows.    LATE for Appointment: If you are 15 or more minutes late for your appointment, you may be asked to reschedule.  MA/LAB APPTS: Must be scheduled, cannot accept walk in lab visits.  We only draw labs for patients established in our office.  We only do injections for medications ordered by our office.  Acute Sick Visits:  Nothing other than acute complaint will be addressed at this visit.  TRADITIONAL MEDICARE  DOES NOT COVER PHYSICALS  MEDICARE WELLNESS VISITS: These are NOT physicals but the free annual visit offered by Medicare to discuss wellness issues. Medication refills, checkups, etc. will not be addressed during this visit.  Medication Refills: Refills are handled electronically so please contact your pharmacy for medication refills

## 2023-12-27 NOTE — TELEPHONE ENCOUNTER
Patient had a seizure maybe and he had to get stitches in the corner of his right eye. He needs them removed 1 week from yesterday. Please give him a call back.

## 2023-12-28 LAB — LAMOTRIGINE SERPL-MCNC: 12.5 UG/ML (ref 3–15)

## 2024-01-02 ENCOUNTER — OFFICE VISIT (OUTPATIENT)
Dept: FAMILY MEDICINE CLINIC | Age: 29
End: 2024-01-02
Payer: COMMERCIAL

## 2024-01-02 ENCOUNTER — TELEPHONE (OUTPATIENT)
Dept: NEUROLOGY | Age: 29
End: 2024-01-02

## 2024-01-02 VITALS
DIASTOLIC BLOOD PRESSURE: 64 MMHG | BODY MASS INDEX: 31.08 KG/M2 | HEART RATE: 70 BPM | OXYGEN SATURATION: 99 % | WEIGHT: 198 LBS | HEIGHT: 67 IN | SYSTOLIC BLOOD PRESSURE: 100 MMHG

## 2024-01-02 DIAGNOSIS — Z48.02 VISIT FOR SUTURE REMOVAL: Primary | ICD-10-CM

## 2024-01-02 DIAGNOSIS — G40.909 SEIZURE DISORDER (HCC): ICD-10-CM

## 2024-01-02 DIAGNOSIS — S05.31XD: ICD-10-CM

## 2024-01-02 PROCEDURE — 99212 OFFICE O/P EST SF 10 MIN: CPT | Performed by: NURSE PRACTITIONER

## 2024-01-02 PROCEDURE — G8484 FLU IMMUNIZE NO ADMIN: HCPCS | Performed by: NURSE PRACTITIONER

## 2024-01-02 PROCEDURE — G8417 CALC BMI ABV UP PARAM F/U: HCPCS | Performed by: NURSE PRACTITIONER

## 2024-01-02 PROCEDURE — 1036F TOBACCO NON-USER: CPT | Performed by: NURSE PRACTITIONER

## 2024-01-02 PROCEDURE — G8427 DOCREV CUR MEDS BY ELIG CLIN: HCPCS | Performed by: NURSE PRACTITIONER

## 2024-01-02 ASSESSMENT — PATIENT HEALTH QUESTIONNAIRE - PHQ9
SUM OF ALL RESPONSES TO PHQ QUESTIONS 1-9: 0
1. LITTLE INTEREST OR PLEASURE IN DOING THINGS: 0
SUM OF ALL RESPONSES TO PHQ QUESTIONS 1-9: 0
SUM OF ALL RESPONSES TO PHQ9 QUESTIONS 1 & 2: 0
SUM OF ALL RESPONSES TO PHQ QUESTIONS 1-9: 0
2. FEELING DOWN, DEPRESSED OR HOPELESS: 0
SUM OF ALL RESPONSES TO PHQ QUESTIONS 1-9: 0

## 2024-01-02 NOTE — TELEPHONE ENCOUNTER
Kenya at [facility] called pt had a breakthrough seizure over [facility] went to ER .  Pt was in her office today for a ER follow up appt.  Called pt pt he s=aid he did not miss any dosages of medication.  Keppra level was less than 2.0 Lamictal  level was 12.6.  Prashant is wanting to know if we can  his appt up sooner.  Please call pt back.

## 2024-01-02 NOTE — PROGRESS NOTES
Susana Hardwick (:  1995) is a 28 y.o. male,Established patient, here for evaluation of the following chief complaint(s):    Suture / Staple Removal ([facility]izure at [facility])      SUBJECTIVE/OBJECTIVE:  HPI  SUSANA HAD A BREAK THROUGH SEIZURE DAY AFTER [facility]- HE HAS TWO SUTURES CORNER OF RIGHT EYE  THAT NEED TO BE REMOVED- STATES HE IS TAKING HIS MEDICATION AS PRESCRIBED    Susana Hardwick is a 28 y.o. male with a history of seizure disorder and developmental delay who presents to the emergency department today via ambulance from home after he reportedly had a seizure.  He is here with his grandmother who states she heard the patient fall and immediately went into the bathroom and patient had fallen forward into the bathroom and was still having a seizure.  She called 911.  On arrival to the emergency department, patient had another seizure.  He is given Ativan and is postictal at the time of my exam.  I am unable to obtain an HPI and review of systems.  He did sustain a laceration to the lateral aspect of his right eyebrow.  Grandmother states patient reports he takes his medication for epilepsy.   Review of Systems   Skin:         SUTURES RIGHT EYE       Physical Exam  Skin:     Comments: RIGHT  OUTER  UPPER LID - 2 SUTURES REMOVED WITHOUT ISSUE   Neurological:      Mental Status: He is alert.   Psychiatric:         Attention and Perception: Attention and perception normal.         Mood and Affect: Mood normal.         Speech: Speech normal.         Behavior: Behavior normal.         Thought Content: Thought content normal.         Judgment: Judgment normal.       Susana was seen today for suture / staple removal.    Diagnoses and all orders for this visit:    Visit for suture removal  Laceration of right eye, subsequent encounter  TWO SUTURES REMOVED WITHOUT DIFFICULTY  Seizure disorder (HCC)  DR GARCIA OFFICE CALLED - THEY WILL CALL SUSANA TO SCHEDULE WITH DR MUNOZ BEFORE MARCH             An

## 2024-01-25 ENCOUNTER — OFFICE VISIT (OUTPATIENT)
Dept: NEUROLOGY | Age: 29
End: 2024-01-25
Payer: COMMERCIAL

## 2024-01-25 VITALS
BODY MASS INDEX: 31.08 KG/M2 | WEIGHT: 198 LBS | SYSTOLIC BLOOD PRESSURE: 122 MMHG | HEART RATE: 74 BPM | HEIGHT: 67 IN | DIASTOLIC BLOOD PRESSURE: 74 MMHG

## 2024-01-25 DIAGNOSIS — G40.209 COMPLEX PARTIAL EPILEPSY WITH GENERALIZATION AND WITH NONINTRACTABLE EPILEPSY (HCC): ICD-10-CM

## 2024-01-25 DIAGNOSIS — G40.209 COMPLEX PARTIAL EPILEPSY WITH GENERALIZATION AND WITH NONINTRACTABLE EPILEPSY (HCC): Primary | ICD-10-CM

## 2024-01-25 PROCEDURE — 99214 OFFICE O/P EST MOD 30 MIN: CPT | Performed by: PSYCHIATRY & NEUROLOGY

## 2024-01-25 PROCEDURE — 1036F TOBACCO NON-USER: CPT | Performed by: PSYCHIATRY & NEUROLOGY

## 2024-01-25 PROCEDURE — G8484 FLU IMMUNIZE NO ADMIN: HCPCS | Performed by: PSYCHIATRY & NEUROLOGY

## 2024-01-25 PROCEDURE — G8417 CALC BMI ABV UP PARAM F/U: HCPCS | Performed by: PSYCHIATRY & NEUROLOGY

## 2024-01-25 PROCEDURE — G8427 DOCREV CUR MEDS BY ELIG CLIN: HCPCS | Performed by: PSYCHIATRY & NEUROLOGY

## 2024-01-25 RX ORDER — LEVETIRACETAM 500 MG/1
500 TABLET ORAL 2 TIMES DAILY
Qty: 180 TABLET | Refills: 0 | Status: SHIPPED | OUTPATIENT
Start: 2024-01-25

## 2024-01-25 RX ORDER — LAMOTRIGINE 25 MG/1
TABLET ORAL
Qty: 900 TABLET | Refills: 1 | Status: SHIPPED | OUTPATIENT
Start: 2024-01-25

## 2024-01-25 NOTE — PROGRESS NOTES
The patient came today for follow up regarding: epilepsy      This is my first encounter with the patient.  The patient was seen by Dr. Pennignton.  I was able to review the patient's record, imaging, notes from different physicians and recent events.    The patient has longstanding history of epilepsy since childhood.  Description complex partial seizure acutely secondary generalization.  No triggers.  History is limited from the patient.  Last seizure was several weeks ago.  He is currently on Lamictal 250 mg daily and Keppra 500 mg daily.  No side effect of these medications.  No fever or chills, dysphagia or dysarthria.  Other review of system was unremarkable        Exam:   Constitutional:   Vitals:    01/25/24 1036   BP: 122/74   Pulse: 74   Weight: 89.8 kg (198 lb)   Height: 1.702 m (5' 7.01\")       General appearance:  Normal development and appear in no acute distress.   Mental Status:   Awake and alert  Poor fund of knowledge and immediate recall  Fluent speech  Poor vocabulary  Cranial Nerves:   II: Pupils: equal, round, reactive to light  III,IV,VI: Extra Ocular Movements are intact. No nystagmus  V: Facial sensation is intact   VII: Facial strength and movements: intact and symmetric  XII: Tongue movements are normal  Musculoskeletal: 5/5 in all 4 extremities.   Tone: Normal tone.   Coordination: no tremors or drift  DTR: symmetric 2 in UL and LL  Sensation: normal to all modalities in both arms and legs.  Gait/Posture: steady gait     ROS : A 10-14 system review of constitutional, cardiovascular, respiratory, GI, eyes, , ENT, musculoskeletal, endocrine, hematological, skin, SHEENT, genitourinary, psychiatric and neurologic systems was obtained and updated today which is unremarkable except as mentioned in my HPI      Medical decision making:    A. Problems (any 1)    High:    [] Acute/Chronic Illness/injury posing threat to life or bodily function:    [] Severe exacerbation of chronic illness:

## 2024-01-26 ENCOUNTER — HOSPITAL ENCOUNTER (OUTPATIENT)
Age: 29
Discharge: HOME | End: 2024-01-26
Payer: COMMERCIAL

## 2024-01-26 LAB
ALBUMIN SERPL-MCNC: 4.5 G/DL (ref 3.4–5)
ALBUMIN/GLOB SERPL: 1.6 {RATIO} (ref 1.1–2.2)
ALP SERPL-CCNC: 72 U/L (ref 40–129)
ALT SERPL-CCNC: 11 U/L (ref 10–40)
ANION GAP SERPL CALCULATED.3IONS-SCNC: 11 MMOL/L (ref 3–16)
AST SERPL-CCNC: 19 U/L (ref 15–37)
BASOPHILS # BLD: 0 K/UL (ref 0–0.2)
BASOPHILS NFR BLD: 0.5 %
BILIRUB DIRECT SERPL-MCNC: <0.2 MG/DL (ref 0–0.3)
BILIRUB INDIRECT SERPL-MCNC: NORMAL MG/DL (ref 0–1)
BILIRUB SERPL-MCNC: 0.3 MG/DL (ref 0–1)
BUN SERPL-MCNC: 11 MG/DL (ref 7–20)
CALCIUM SERPL-MCNC: 9.1 MG/DL (ref 8.3–10.6)
CHLORIDE SERPL-SCNC: 104 MMOL/L (ref 99–110)
CO2 SERPL-SCNC: 24 MMOL/L (ref 21–32)
CREAT SERPL-MCNC: 1 MG/DL (ref 0.9–1.3)
DEPRECATED RDW RBC AUTO: 12.6 % (ref 12.4–15.4)
EOSINOPHIL # BLD: 0 K/UL (ref 0–0.6)
EOSINOPHIL NFR BLD: 0.2 %
GFR SERPLBLD CREATININE-BSD FMLA CKD-EPI: >60 ML/MIN/{1.73_M2}
GLUCOSE SERPL-MCNC: 81 MG/DL (ref 70–99)
HCT VFR BLD AUTO: 47.7 % (ref 40.5–52.5)
HGB BLD-MCNC: 16 G/DL (ref 13.5–17.5)
LYMPHOCYTES # BLD: 2 K/UL (ref 1–5.1)
LYMPHOCYTES NFR BLD: 42.5 %
MCH RBC QN AUTO: 30.6 PG (ref 26–34)
MCHC RBC AUTO-ENTMCNC: 33.6 G/DL (ref 31–36)
MCV RBC AUTO: 91 FL (ref 80–100)
MONOCYTES # BLD: 0.5 K/UL (ref 0–1.3)
MONOCYTES NFR BLD: 10 %
NEUTROPHILS # BLD: 2.2 K/UL (ref 1.7–7.7)
NEUTROPHILS NFR BLD: 46.8 %
PLATELET # BLD AUTO: 298 K/UL (ref 135–450)
PMV BLD AUTO: 8.6 FL (ref 5–10.5)
POTASSIUM SERPL-SCNC: 4.1 MMOL/L (ref 3.5–5.1)
PROT SERPL-MCNC: 7.3 G/DL (ref 6.4–8.2)
RBC # BLD AUTO: 5.25 M/UL (ref 4.2–5.9)
SODIUM SERPL-SCNC: 139 MMOL/L (ref 136–145)
WBC # BLD AUTO: 4.7 K/UL (ref 4–11)

## 2024-01-26 PROCEDURE — 80175 DRUG SCREEN QUAN LAMOTRIGINE: CPT

## 2024-01-26 PROCEDURE — 85025 COMPLETE CBC W/AUTO DIFF WBC: CPT

## 2024-01-26 PROCEDURE — 82248 BILIRUBIN DIRECT: CPT

## 2024-01-26 PROCEDURE — 80053 COMPREHEN METABOLIC PANEL: CPT

## 2024-01-26 PROCEDURE — 36415 COLL VENOUS BLD VENIPUNCTURE: CPT

## 2024-01-28 LAB — LAMOTRIGINE SERPL-MCNC: 14.3 UG/ML (ref 3–15)

## 2024-04-29 DIAGNOSIS — G40.209 COMPLEX PARTIAL EPILEPSY WITH GENERALIZATION AND WITH NONINTRACTABLE EPILEPSY (HCC): ICD-10-CM

## 2024-04-30 RX ORDER — LEVETIRACETAM 500 MG/1
500 TABLET ORAL 2 TIMES DAILY
Qty: 180 TABLET | Refills: 0 | Status: SHIPPED | OUTPATIENT
Start: 2024-04-30

## 2024-08-11 ENCOUNTER — HOSPITAL ENCOUNTER (EMERGENCY)
Age: 29
Discharge: HOME | End: 2024-08-11
Payer: COMMERCIAL

## 2024-08-11 VITALS
BODY MASS INDEX: 29.57 KG/M2 | SYSTOLIC BLOOD PRESSURE: 144 MMHG | HEIGHT: 67 IN | HEART RATE: 78 BPM | RESPIRATION RATE: 21 BRPM | OXYGEN SATURATION: 98 % | WEIGHT: 188.4 LBS | TEMPERATURE: 98.5 F | DIASTOLIC BLOOD PRESSURE: 82 MMHG
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
## 2024-08-11 PROCEDURE — 99283 EMERGENCY DEPT VISIT LOW MDM: CPT

## 2024-08-11 PROCEDURE — 6370000000 HC RX 637 (ALT 250 FOR IP): Performed by: PHYSICIAN ASSISTANT

## 2024-08-11 RX ORDER — AMOXICILLIN AND CLAVULANATE POTASSIUM 875; 125 MG/1; MG/1
1 TABLET, FILM COATED ORAL ONCE
Status: COMPLETED | OUTPATIENT
Start: 2024-08-11 | End: 2024-08-11

## 2024-08-11 RX ORDER — AMOXICILLIN AND CLAVULANATE POTASSIUM 875; 125 MG/1; MG/1
1 TABLET, FILM COATED ORAL 2 TIMES DAILY
Qty: 20 TABLET | Refills: 0 | Status: SHIPPED | OUTPATIENT
Start: 2024-08-11 | End: 2024-08-21

## 2024-08-11 RX ADMIN — AMOXICILLIN AND CLAVULANATE POTASSIUM 1 TABLET: 875; 125 TABLET, FILM COATED ORAL at 17:39

## 2024-08-11 NOTE — ED PROVIDER NOTES
[facility] EMERGENCY DEPARTMENT  EMERGENCY DEPARTMENT ENCOUNTER        Pt Name: Nahid Hardwick  MRN: [id number]  Birthdate 1995  Date of evaluation: 8/11/2024  Provider: MIKE Carroll  PCP: Kenya De Anda APRN - CNP  Note Started: 5:38 PM EDT 8/11/24      MIA. I have evaluated this patient.        CHIEF COMPLAINT       Chief Complaint   Patient presents with    Animal Bite     Pt via self from home, states was bit by dog yesterday, unsure if it had vaccines       HISTORY OF PRESENT ILLNESS: 1 or more Elements     History From: Patient  Limitations to history : None    Nahid Hardwick is a 28 y.o. male with past medical history of seizures, developmental delay who presents ED with complaint of dog bite.  Report was walking yesterday when was bit by dog.  Complains bit to the right lateral abdomen.  He is unsure of his last tetanus vaccine.  He is unsure if dog was up-to-date on vaccinations.  He did file police report.  Denies pain.  Denies bleeding or drainage.  Nuys fever or chills.  Denies nausea or vomiting.  Denies urinary symptoms or changes in bowel movements    Nursing Notes were all reviewed and agreed with or any disagreements were addressed in the HPI.    REVIEW OF SYSTEMS :      Review of Systems   Constitutional:  Negative for activity change, appetite change, chills and fever.   Respiratory: Negative.  Negative for cough, chest tightness and shortness of breath.    Cardiovascular: Negative.  Negative for chest pain.   Gastrointestinal:  Negative for abdominal pain, constipation, diarrhea, nausea and vomiting.   Genitourinary:  Negative for difficulty urinating and dysuria.   Musculoskeletal:  Positive for myalgias. Negative for arthralgias, neck pain and neck stiffness.   Skin:  Positive for wound. Negative for color change, pallor and rash.   Neurological:  Negative for dizziness, weakness, light-headedness, numbness and headaches.       Positives and Pertinent

## 2024-08-12 NOTE — ED NOTES
8/12/24  Pt contacted re: ED visit 8/11/24; \"have appt to be rechecked 8/14/24\", advised to return if any problems or concerns.

## 2024-08-14 ENCOUNTER — OFFICE VISIT (OUTPATIENT)
Dept: FAMILY MEDICINE CLINIC | Age: 29
End: 2024-08-14

## 2024-08-14 VITALS
SYSTOLIC BLOOD PRESSURE: 110 MMHG | DIASTOLIC BLOOD PRESSURE: 70 MMHG | HEART RATE: 89 BPM | OXYGEN SATURATION: 97 % | WEIGHT: 190 LBS | BODY MASS INDEX: 29.82 KG/M2 | HEIGHT: 67 IN
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
## 2024-08-14 SDOH — ECONOMIC STABILITY: INCOME INSECURITY: IN THE LAST 12 MONTHS, WAS THERE A TIME WHEN YOU WERE NOT ABLE TO PAY THE MORTGAGE OR RENT ON TIME?: NO

## 2024-08-14 SDOH — ECONOMIC STABILITY: FOOD INSECURITY: WITHIN THE PAST 12 MONTHS, YOU WORRIED THAT YOUR FOOD WOULD RUN OUT BEFORE YOU GOT MONEY TO BUY MORE.: NEVER TRUE

## 2024-08-14 SDOH — ECONOMIC STABILITY: FOOD INSECURITY: WITHIN THE PAST 12 MONTHS, THE FOOD YOU BOUGHT JUST DIDN'T LAST AND YOU DIDN'T HAVE MONEY TO GET MORE.: NEVER TRUE

## 2024-08-14 SDOH — ECONOMIC STABILITY: INCOME INSECURITY: HOW HARD IS IT FOR YOU TO PAY FOR THE VERY BASICS LIKE FOOD, HOUSING, MEDICAL CARE, AND HEATING?: NOT HARD AT ALL

## 2024-08-14 ASSESSMENT — ENCOUNTER SYMPTOMS
WHEEZING: 0
SHORTNESS OF BREATH: 0
COUGH: 0

## 2024-08-14 NOTE — PATIENT INSTRUCTIONS
You may receive a survey regarding the care you received during your visit.  Your input is valuable to us.  We encourage you to complete and return your survey.  We hope you will choose us in the future for your healthcare needs. GENERAL OFFICE POLICIES      Telephone Calls: Messages will be answered within 1-2 business days, unless the provider is out of the office.  If it is urgent a covering provider will answer. (this does not include Medication refills).    MyChart:  We recommend all patients sign up for [employer]hart.  Through this portal you can see your lab results, request refills, schedule appointments, pay your bill and send messages to the office.   [employer]hart messages will be answered within 1-2 business days unless the provider is out of the office.  For urgent matters, please call the office.  Appointments:  All appointments must be scheduled.  We ask all patients to schedule their next follow up appointment before they leave the office to make sure you will be able to be seen before you run out of medications.  24 hours notice is required to cancel or reschedule an appointment to avoid being marked as a no show.  You may be dismissed from the practice after 3 no shows.    LATE for Appointment: If you are 15 or more minutes late for your appointment, you may be asked to reschedule.  MA/LAB APPTS: Must be scheduled, cannot accept walk in lab visits.  We only draw labs for patients established in our office.  We only do injections for medications ordered by our office.  Acute Sick Visits:  Nothing other than acute complaint will be addressed at this visit.  TRADITIONAL MEDICARE  DOES NOT COVER PHYSICALS  MEDICARE WELLNESS VISITS: These are NOT physicals but the free annual visit offered by Medicare to discuss wellness issues. Medication refills, checkups, etc. will not be addressed during this visit.  Medication Refills: Refills are handled electronically so please contact your pharmacy for medication refills

## 2024-08-14 NOTE — PROGRESS NOTES
Nahid Hardwick (:  1995) is a 28 y.o. male,Established patient, here for evaluation of the following chief complaint(s):  Follow-Up from Hospital (ER follow up- Dog Bite)        ASSESSMENT/PLAN:  1. Dog bite, subsequent encounter  -Emergency room notes reviewed.  Medications reconciled.  Area is healing appropriately.  No additional intervention needed.  Encouraged to complete antibiotic.  NSAIDs as needed.  Follow-up as needed.     Return if symptoms worsen or fail to improve.     SUBJECTIVE/OBJECTIVE:  MELL Whitfield presents today for emergency room discharge follow-up.  He was seen at [facility] ER on  following a dog bite.  He states that he was walking in his neighborhood and the dog jumped over a fence and bit him on the side of his abdomen.  He went to the ER and was given Augmentin.  Was up-to-date on his Tdap.  He does feel things are healing up okay.  Sometimes painful.  No redness or drainage.  He is taking the Augmentin as prescribed.     Review of Systems   Constitutional:  Negative for chills and fever.   Respiratory:  Negative for cough, shortness of breath and wheezing.    Cardiovascular:  Negative for chest pain, palpitations and leg swelling.   Neurological:  Negative for dizziness, weakness, light-headedness, numbness and headaches.   Psychiatric/Behavioral:  Negative for decreased concentration, dysphoric mood, self-injury, sleep disturbance and suicidal ideas. The patient is not nervous/anxious.        Physical Exam  Constitutional:       General: He is not in acute distress.     Appearance: Normal appearance. He is normal weight.   Pulmonary:      Effort: Pulmonary effort is normal.   Skin:     Comments: Healing wound of the right side of torso.  Scabbed without drainage or erythema.  Contusion present.   Neurological:      Mental Status: He is alert and oriented to person, place, and time.   Psychiatric:         Mood and Affect: Mood normal.         Behavior: Behavior normal.

## 2024-09-23 ENCOUNTER — OFFICE VISIT (OUTPATIENT)
Dept: NEUROLOGY | Age: 29
End: 2024-09-23
Payer: COMMERCIAL

## 2024-09-23 VITALS
SYSTOLIC BLOOD PRESSURE: 114 MMHG | HEIGHT: 67 IN | WEIGHT: 182 LBS | BODY MASS INDEX: 28.56 KG/M2 | DIASTOLIC BLOOD PRESSURE: 76 MMHG | HEART RATE: 72 BPM

## 2024-09-23 DIAGNOSIS — G40.209 COMPLEX PARTIAL EPILEPSY WITH GENERALIZATION AND WITH NONINTRACTABLE EPILEPSY (HCC): Primary | ICD-10-CM

## 2024-09-23 PROCEDURE — G8427 DOCREV CUR MEDS BY ELIG CLIN: HCPCS | Performed by: PSYCHIATRY & NEUROLOGY

## 2024-09-23 PROCEDURE — 99214 OFFICE O/P EST MOD 30 MIN: CPT | Performed by: PSYCHIATRY & NEUROLOGY

## 2024-09-23 PROCEDURE — G8417 CALC BMI ABV UP PARAM F/U: HCPCS | Performed by: PSYCHIATRY & NEUROLOGY

## 2024-09-23 PROCEDURE — 1036F TOBACCO NON-USER: CPT | Performed by: PSYCHIATRY & NEUROLOGY

## 2024-09-23 RX ORDER — LEVETIRACETAM 500 MG/1
500 TABLET ORAL 2 TIMES DAILY
Qty: 180 TABLET | Refills: 1 | Status: SHIPPED | OUTPATIENT
Start: 2024-09-23

## 2024-09-23 RX ORDER — LAMOTRIGINE 25 MG/1
TABLET ORAL
Qty: 900 TABLET | Refills: 1 | Status: SHIPPED | OUTPATIENT
Start: 2024-09-23

## 2024-09-25 ENCOUNTER — HOSPITAL ENCOUNTER (OUTPATIENT)
Age: 29
Discharge: HOME | End: 2024-09-25
Payer: COMMERCIAL

## 2024-09-25 DIAGNOSIS — G40.209 COMPLEX PARTIAL EPILEPSY WITH GENERALIZATION AND WITH NONINTRACTABLE EPILEPSY (HCC): ICD-10-CM

## 2024-09-25 LAB
ALBUMIN SERPL-MCNC: 4.3 G/DL (ref 3.4–5)
ALBUMIN/GLOB SERPL: 1.6 {RATIO} (ref 1.1–2.2)
ALP SERPL-CCNC: 84 U/L (ref 40–129)
ALT SERPL-CCNC: 11 U/L (ref 10–40)
ANION GAP SERPL CALCULATED.3IONS-SCNC: 11 MMOL/L (ref 3–16)
AST SERPL-CCNC: 24 U/L (ref 15–37)
BASOPHILS # BLD: 0 K/UL (ref 0–0.2)
BASOPHILS NFR BLD: 0.3 %
BILIRUB DIRECT SERPL-MCNC: <0.1 MG/DL (ref 0–0.3)
BILIRUB INDIRECT SERPL-MCNC: NORMAL MG/DL (ref 0–1)
BILIRUB SERPL-MCNC: <0.2 MG/DL (ref 0–1)
BUN SERPL-MCNC: 10 MG/DL (ref 7–20)
CALCIUM SERPL-MCNC: 9.6 MG/DL (ref 8.3–10.6)
CHLORIDE SERPL-SCNC: 103 MMOL/L (ref 99–110)
CO2 SERPL-SCNC: 27 MMOL/L (ref 21–32)
CREAT SERPL-MCNC: 1.1 MG/DL (ref 0.9–1.3)
DEPRECATED RDW RBC AUTO: 12.4 % (ref 12.4–15.4)
EOSINOPHIL # BLD: 0.1 K/UL (ref 0–0.6)
EOSINOPHIL NFR BLD: 1 %
GFR SERPLBLD CREATININE-BSD FMLA CKD-EPI: >90 ML/MIN/{1.73_M2}
GLUCOSE SERPL-MCNC: 76 MG/DL (ref 70–99)
HCT VFR BLD AUTO: 45.4 % (ref 40.5–52.5)
HGB BLD-MCNC: 15.1 G/DL (ref 13.5–17.5)
LYMPHOCYTES # BLD: 2.3 K/UL (ref 1–5.1)
LYMPHOCYTES NFR BLD: 43.5 %
MCH RBC QN AUTO: 30.5 PG (ref 26–34)
MCHC RBC AUTO-ENTMCNC: 33.3 G/DL (ref 31–36)
MCV RBC AUTO: 91.5 FL (ref 80–100)
MONOCYTES # BLD: 0.5 K/UL (ref 0–1.3)
MONOCYTES NFR BLD: 10 %
NEUTROPHILS # BLD: 2.4 K/UL (ref 1.7–7.7)
NEUTROPHILS NFR BLD: 45.2 %
PLATELET # BLD AUTO: 273 K/UL (ref 135–450)
PMV BLD AUTO: 9 FL (ref 5–10.5)
POTASSIUM SERPL-SCNC: 3.5 MMOL/L (ref 3.5–5.1)
PROT SERPL-MCNC: 7 G/DL (ref 6.4–8.2)
RBC # BLD AUTO: 4.96 M/UL (ref 4.2–5.9)
SODIUM SERPL-SCNC: 141 MMOL/L (ref 136–145)
WBC # BLD AUTO: 5.3 K/UL (ref 4–11)

## 2024-09-25 PROCEDURE — 80053 COMPREHEN METABOLIC PANEL: CPT

## 2024-09-25 PROCEDURE — 80175 DRUG SCREEN QUAN LAMOTRIGINE: CPT

## 2024-09-25 PROCEDURE — 82248 BILIRUBIN DIRECT: CPT

## 2024-09-25 PROCEDURE — 85025 COMPLETE CBC W/AUTO DIFF WBC: CPT

## 2024-09-25 PROCEDURE — 36415 COLL VENOUS BLD VENIPUNCTURE: CPT

## 2024-09-26 LAB — LAMOTRIGINE SERPL-MCNC: 14.4 UG/ML (ref 3–15)

## 2024-09-30 ENCOUNTER — TELEPHONE (OUTPATIENT)
Dept: NEUROLOGY | Age: 29
End: 2024-09-30

## 2024-10-21 NOTE — PATIENT INSTRUCTIONS
You may receive a survey regarding the care you received during your visit.  Your input is valuable to us.  We encourage you to complete and return your survey.  We hope you will choose us in the future for your healthcare needs.    5

## 2024-10-22 ENCOUNTER — OFFICE VISIT (OUTPATIENT)
Dept: FAMILY MEDICINE CLINIC | Age: 29
End: 2024-10-22
Payer: COMMERCIAL

## 2024-10-22 VITALS
OXYGEN SATURATION: 100 % | SYSTOLIC BLOOD PRESSURE: 122 MMHG | WEIGHT: 187 LBS | HEART RATE: 80 BPM | HEIGHT: 67 IN | DIASTOLIC BLOOD PRESSURE: 84 MMHG | RESPIRATION RATE: 16 BRPM | BODY MASS INDEX: 29.35 KG/M2

## 2024-10-22 DIAGNOSIS — Z00.00 ANNUAL PHYSICAL EXAM: Primary | ICD-10-CM

## 2024-10-22 DIAGNOSIS — Z13.220 LIPID SCREENING: ICD-10-CM

## 2024-10-22 DIAGNOSIS — L21.9 SEBORRHEIC DERMATITIS: ICD-10-CM

## 2024-10-22 DIAGNOSIS — Z23 NEED FOR INFLUENZA VACCINATION: ICD-10-CM

## 2024-10-22 PROCEDURE — 99395 PREV VISIT EST AGE 18-39: CPT | Performed by: NURSE PRACTITIONER

## 2024-10-22 PROCEDURE — G8417 CALC BMI ABV UP PARAM F/U: HCPCS | Performed by: NURSE PRACTITIONER

## 2024-10-22 PROCEDURE — 1036F TOBACCO NON-USER: CPT | Performed by: NURSE PRACTITIONER

## 2024-10-22 PROCEDURE — G8484 FLU IMMUNIZE NO ADMIN: HCPCS | Performed by: NURSE PRACTITIONER

## 2024-10-22 PROCEDURE — 99213 OFFICE O/P EST LOW 20 MIN: CPT | Performed by: NURSE PRACTITIONER

## 2024-10-22 PROCEDURE — G8427 DOCREV CUR MEDS BY ELIG CLIN: HCPCS | Performed by: NURSE PRACTITIONER

## 2024-10-22 RX ORDER — KETOCONAZOLE 20 MG/ML
SHAMPOO TOPICAL
Qty: 120 ML | Refills: 0 | Status: SHIPPED | OUTPATIENT
Start: 2024-10-22

## 2024-10-22 NOTE — PROGRESS NOTES
well developed, well nourished , in no acute distress , ambulating without difficulty , normal communication ability on Lamictal and Keppra.  Seizures are usually just staring episodes where he is not in contact with his surroundings and has some head movements.    Developmental delay 1995    Due to difficult birth.    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     Past Surgical History:   Procedure Laterality Date    TONSILLECTOMY       Family History   Problem Relation Age of Onset    Hypertension Mother     No Known Problems Father     Asthma Sister     Diabetes Brother     Arthritis Maternal Grandmother     Hypertension Maternal Grandmother     High Cholesterol Maternal Grandmother     Hypertension Maternal Grandfather      Social History     Socioeconomic History    Marital status: Single     Spouse name: Not on file    Number of children: 0    Years of education: 12    Highest education level: High school graduate   Occupational History    Occupation: [employer]     Employer: [employer]     Comment: alej[employer]arcía    Occupation: unemployed   Tobacco Use    Smoking status: Never    Smokeless tobacco: Never   Vaping Use    Vaping status: Never Used   Substance and Sexual Activity    Alcohol use: No     Comment: never drinks alcohol    Drug use: No    Sexual activity: Not Currently     Partners: Female   Other Topics Concern    Not on file   Social History Narrative    No exercise.  5/8/2020.  Walking a lot now.  8/4/21. He walks every day. He is barely at home. 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 9/28/22     Social Determinants of Health     Financial Resource Strain: Low Risk  (8/14/2024)    Overall Financial Resource Strain (CARDIA)     Difficulty of Paying Living Expenses: Not hard at all   Food Insecurity: No Food Insecurity (8/14/2024)    Hunger Vital Sign     Worried About Running Out of Food in the Last Year: Never true     Ran Out of Food in the Last Year: Never true   Transportation Needs: No Transportation Needs (8/14/2024)    PRAPARE - Transportation     Lack of Transportation (Medical): No     Lack of Transportation (Non-Medical): No

## 2024-10-23 LAB
CHOLEST SERPL-MCNC: 246 MG/DL (ref 0–199)
HDLC SERPL-MCNC: 55 MG/DL (ref 40–60)
LDLC SERPL CALC-MCNC: 173 MG/DL
TRIGL SERPL-MCNC: 91 MG/DL (ref 0–150)
VLDLC SERPL CALC-MCNC: 18 MG/DL

## 2024-11-04 ENCOUNTER — TELEPHONE (OUTPATIENT)
Dept: NEUROLOGY | Age: 29
End: 2024-11-04

## 2024-11-04 DIAGNOSIS — G40.209 COMPLEX PARTIAL EPILEPSY WITH GENERALIZATION AND WITH NONINTRACTABLE EPILEPSY (HCC): ICD-10-CM

## 2024-11-04 NOTE — TELEPHONE ENCOUNTER
Submitted PA for Lamotrigine Via [facility] Key: [id number] STATUS: \"Electronic prior authorization not required for NDC. If requesting a quantity above allowable limits, please submit via other method.\"    Submitted via paper copy (unsure of the quantity limit) FAXED to [employer] 900 tabs per 90 days).  PA uploaded in media. Status: PENDING    Follow up done daily; if no decision with in three days we will refax.  If another three days goes by with no decision will call the insurance for status.

## 2024-11-04 NOTE — TELEPHONE ENCOUNTER
Pt called requesting refill on Lamictal. Pt uses [facility] in [place].  Pt has upcoming appt on 3/25/25

## 2024-11-04 NOTE — TELEPHONE ENCOUNTER
Spoke to pharmacy. Prior Auth is needed for lamotrigine 25mg   5 tablets twice daily     Pt has been taking this dose since 3/7/22

## 2024-11-06 ENCOUNTER — APPOINTMENT (OUTPATIENT)
Dept: GENERAL RADIOLOGY | Age: 29
DRG: 053 | End: 2024-11-06
Payer: COMMERCIAL

## 2024-11-06 ENCOUNTER — APPOINTMENT (OUTPATIENT)
Dept: CT IMAGING | Age: 29
DRG: 053 | End: 2024-11-06
Payer: COMMERCIAL

## 2024-11-06 ENCOUNTER — HOSPITAL ENCOUNTER (INPATIENT)
Age: 29
LOS: 5 days | Discharge: HOME | DRG: 053 | End: 2024-11-11
Attending: EMERGENCY MEDICINE | Admitting: INTERNAL MEDICINE
Payer: COMMERCIAL

## 2024-11-06 DIAGNOSIS — E83.42 HYPOMAGNESEMIA: ICD-10-CM

## 2024-11-06 DIAGNOSIS — G40.209 COMPLEX PARTIAL EPILEPSY WITH GENERALIZATION AND WITH NONINTRACTABLE EPILEPSY (HCC): ICD-10-CM

## 2024-11-06 DIAGNOSIS — E87.6 HYPOKALEMIA: ICD-10-CM

## 2024-11-06 DIAGNOSIS — G40.919 BREAKTHROUGH SEIZURE (HCC): Primary | ICD-10-CM

## 2024-11-06 PROBLEM — R56.9 SEIZURE (HCC): Status: ACTIVE | Noted: 2024-11-06

## 2024-11-06 LAB
ALBUMIN SERPL-MCNC: 2.8 G/DL (ref 3.4–5)
ALP SERPL-CCNC: 51 U/L (ref 40–129)
ALT SERPL-CCNC: 8 U/L (ref 10–40)
AMPHETAMINES UR QL SCN>1000 NG/ML: NORMAL
ANION GAP SERPL CALCULATED.3IONS-SCNC: 9 MMOL/L (ref 3–16)
AST SERPL-CCNC: 17 U/L (ref 15–37)
BARBITURATES UR QL SCN>200 NG/ML: NORMAL
BASE EXCESS BLDV CALC-SCNC: -3.4 MMOL/L (ref -3–3)
BASOPHILS # BLD: 0 K/UL (ref 0–0.2)
BASOPHILS NFR BLD: 0.1 %
BENZODIAZ UR QL SCN>200 NG/ML: NORMAL
BILIRUB DIRECT SERPL-MCNC: <0.1 MG/DL (ref 0–0.3)
BILIRUB INDIRECT SERPL-MCNC: ABNORMAL MG/DL (ref 0–1)
BILIRUB SERPL-MCNC: <0.2 MG/DL (ref 0–1)
BILIRUB UR QL STRIP.AUTO: NEGATIVE
BUN SERPL-MCNC: 9 MG/DL (ref 7–20)
CALCIUM SERPL-MCNC: 6.2 MG/DL (ref 8.3–10.6)
CANNABINOIDS UR QL SCN>50 NG/ML: NORMAL
CHLORIDE SERPL-SCNC: 116 MMOL/L (ref 99–110)
CLARITY UR: CLEAR
CO2 BLDV-SCNC: 53 MMOL/L
CO2 SERPL-SCNC: 18 MMOL/L (ref 21–32)
COCAINE UR QL SCN: NORMAL
COHGB MFR BLDV: 2.2 % (ref 0–1.5)
COLOR UR: YELLOW
CREAT SERPL-MCNC: 0.6 MG/DL (ref 0.9–1.3)
DEPRECATED RDW RBC AUTO: 12.9 % (ref 12.4–15.4)
DRUG SCREEN COMMENT UR-IMP: NORMAL
EOSINOPHIL # BLD: 0 K/UL (ref 0–0.6)
EOSINOPHIL NFR BLD: 0.1 %
FENTANYL SCREEN, URINE: NORMAL
GFR SERPLBLD CREATININE-BSD FMLA CKD-EPI: >90 ML/MIN/{1.73_M2}
GLUCOSE BLD-MCNC: 109 MG/DL (ref 70–99)
GLUCOSE SERPL-MCNC: 68 MG/DL (ref 70–99)
GLUCOSE UR STRIP.AUTO-MCNC: NEGATIVE MG/DL
HCO3 BLDV-SCNC: 22.3 MMOL/L (ref 23–29)
HCT VFR BLD AUTO: 46 % (ref 40.5–52.5)
HGB BLD-MCNC: 15.6 G/DL (ref 13.5–17.5)
HGB UR QL STRIP.AUTO: NEGATIVE
KETONES UR STRIP.AUTO-MCNC: NEGATIVE MG/DL
LACTATE BLDV-SCNC: 1.8 MMOL/L (ref 0.4–2)
LEUKOCYTE ESTERASE UR QL STRIP.AUTO: NEGATIVE
LYMPHOCYTES # BLD: 1.2 K/UL (ref 1–5.1)
LYMPHOCYTES NFR BLD: 17.3 %
MAGNESIUM SERPL-MCNC: 1.28 MG/DL (ref 1.8–2.4)
MAGNESIUM SERPL-MCNC: 2.21 MG/DL (ref 1.8–2.4)
MCH RBC QN AUTO: 31.6 PG (ref 26–34)
MCHC RBC AUTO-ENTMCNC: 33.9 G/DL (ref 31–36)
MCV RBC AUTO: 93.2 FL (ref 80–100)
METHADONE UR QL SCN>300 NG/ML: NORMAL
METHGB MFR BLDV: 0.4 %
MONOCYTES # BLD: 0.5 K/UL (ref 0–1.3)
MONOCYTES NFR BLD: 7.2 %
NEUTROPHILS # BLD: 5.2 K/UL (ref 1.7–7.7)
NEUTROPHILS NFR BLD: 75.3 %
NITRITE UR QL STRIP.AUTO: NEGATIVE
O2 CT VFR BLDV CALC: 18 VOL %
O2 THERAPY: ABNORMAL
OPIATES UR QL SCN>300 NG/ML: NORMAL
OXYCODONE UR QL SCN: NORMAL
PCO2 BLDV: 41.6 MMHG (ref 40–50)
PCP UR QL SCN>25 NG/ML: NORMAL
PERFORMED ON: ABNORMAL
PH BLDV: 7.34 [PH] (ref 7.35–7.45)
PH UR STRIP.AUTO: 6.5 [PH] (ref 5–8)
PH UR STRIP: 6.5 [PH]
PLATELET # BLD AUTO: 286 K/UL (ref 135–450)
PMV BLD AUTO: 7.8 FL (ref 5–10.5)
PO2 BLDV: 172 MMHG (ref 25–40)
POTASSIUM SERPL-SCNC: 2.8 MMOL/L (ref 3.5–5.1)
PROT SERPL-MCNC: 4.4 G/DL (ref 6.4–8.2)
PROT UR STRIP.AUTO-MCNC: NEGATIVE MG/DL
RBC # BLD AUTO: 4.94 M/UL (ref 4.2–5.9)
SAO2 % BLDV: 100 %
SODIUM SERPL-SCNC: 143 MMOL/L (ref 136–145)
SP GR UR STRIP.AUTO: 1.01 (ref 1–1.03)
UA COMPLETE W REFLEX CULTURE PNL UR: NORMAL
UA DIPSTICK W REFLEX MICRO PNL UR: NORMAL
URN SPEC COLLECT METH UR: NORMAL
UROBILINOGEN UR STRIP-ACNC: 1 E.U./DL
WBC # BLD AUTO: 6.9 K/UL (ref 4–11)

## 2024-11-06 PROCEDURE — 82803 BLOOD GASES ANY COMBINATION: CPT

## 2024-11-06 PROCEDURE — 2580000003 HC RX 258: Performed by: INTERNAL MEDICINE

## 2024-11-06 PROCEDURE — 1200000000 HC SEMI PRIVATE

## 2024-11-06 PROCEDURE — 80048 BASIC METABOLIC PNL TOTAL CA: CPT

## 2024-11-06 PROCEDURE — 72125 CT NECK SPINE W/O DYE: CPT

## 2024-11-06 PROCEDURE — 85025 COMPLETE CBC W/AUTO DIFF WBC: CPT

## 2024-11-06 PROCEDURE — 80307 DRUG TEST PRSMV CHEM ANLYZR: CPT

## 2024-11-06 PROCEDURE — 96374 THER/PROPH/DIAG INJ IV PUSH: CPT

## 2024-11-06 PROCEDURE — 99285 EMERGENCY DEPT VISIT HI MDM: CPT

## 2024-11-06 PROCEDURE — 6360000002 HC RX W HCPCS: Performed by: EMERGENCY MEDICINE

## 2024-11-06 PROCEDURE — 2580000003 HC RX 258: Performed by: EMERGENCY MEDICINE

## 2024-11-06 PROCEDURE — 93005 ELECTROCARDIOGRAM TRACING: CPT | Performed by: EMERGENCY MEDICINE

## 2024-11-06 PROCEDURE — 70486 CT MAXILLOFACIAL W/O DYE: CPT

## 2024-11-06 PROCEDURE — 70450 CT HEAD/BRAIN W/O DYE: CPT

## 2024-11-06 PROCEDURE — 81003 URINALYSIS AUTO W/O SCOPE: CPT

## 2024-11-06 PROCEDURE — 6360000002 HC RX W HCPCS: Performed by: PHYSICIAN ASSISTANT

## 2024-11-06 PROCEDURE — 6370000000 HC RX 637 (ALT 250 FOR IP): Performed by: INTERNAL MEDICINE

## 2024-11-06 PROCEDURE — 83735 ASSAY OF MAGNESIUM: CPT

## 2024-11-06 PROCEDURE — 80076 HEPATIC FUNCTION PANEL: CPT

## 2024-11-06 PROCEDURE — 83605 ASSAY OF LACTIC ACID: CPT

## 2024-11-06 PROCEDURE — 96375 TX/PRO/DX INJ NEW DRUG ADDON: CPT

## 2024-11-06 PROCEDURE — 80175 DRUG SCREEN QUAN LAMOTRIGINE: CPT

## 2024-11-06 PROCEDURE — 71045 X-RAY EXAM CHEST 1 VIEW: CPT

## 2024-11-06 PROCEDURE — 80177 DRUG SCRN QUAN LEVETIRACETAM: CPT

## 2024-11-06 RX ORDER — NICOTINE 21 MG/24HR
1 PATCH, TRANSDERMAL 24 HOURS TRANSDERMAL DAILY PRN
Status: DISCONTINUED | OUTPATIENT
Start: 2024-11-06 | End: 2024-11-06

## 2024-11-06 RX ORDER — POTASSIUM CHLORIDE 1500 MG/1
40 TABLET, EXTENDED RELEASE ORAL ONCE
Status: DISCONTINUED | OUTPATIENT
Start: 2024-11-06 | End: 2024-11-06

## 2024-11-06 RX ORDER — LANOLIN ALCOHOL/MO/W.PET/CERES
3 CREAM (GRAM) TOPICAL NIGHTLY PRN
Status: DISCONTINUED | OUTPATIENT
Start: 2024-11-06 | End: 2024-11-11 | Stop reason: HOSPADM

## 2024-11-06 RX ORDER — ONDANSETRON 2 MG/ML
4 INJECTION INTRAMUSCULAR; INTRAVENOUS EVERY 6 HOURS PRN
Status: DISCONTINUED | OUTPATIENT
Start: 2024-11-06 | End: 2024-11-11 | Stop reason: HOSPADM

## 2024-11-06 RX ORDER — POTASSIUM CHLORIDE 7.45 MG/ML
10 INJECTION INTRAVENOUS
Status: COMPLETED | OUTPATIENT
Start: 2024-11-06 | End: 2024-11-06

## 2024-11-06 RX ORDER — SODIUM CHLORIDE 0.9 % (FLUSH) 0.9 %
10 SYRINGE (ML) INJECTION EVERY 12 HOURS SCHEDULED
Status: DISCONTINUED | OUTPATIENT
Start: 2024-11-06 | End: 2024-11-11 | Stop reason: HOSPADM

## 2024-11-06 RX ORDER — DEXTROSE MONOHYDRATE 100 MG/ML
INJECTION, SOLUTION INTRAVENOUS CONTINUOUS
Status: DISCONTINUED | OUTPATIENT
Start: 2024-11-06 | End: 2024-11-06

## 2024-11-06 RX ORDER — LEVETIRACETAM 500 MG/1
500 TABLET ORAL 2 TIMES DAILY
Status: DISCONTINUED | OUTPATIENT
Start: 2024-11-07 | End: 2024-11-07

## 2024-11-06 RX ORDER — LORAZEPAM 2 MG/ML
INJECTION INTRAMUSCULAR
Status: DISPENSED
Start: 2024-11-06 | End: 2024-11-07

## 2024-11-06 RX ORDER — MAGNESIUM SULFATE IN WATER 40 MG/ML
2000 INJECTION, SOLUTION INTRAVENOUS ONCE
Status: COMPLETED | OUTPATIENT
Start: 2024-11-06 | End: 2024-11-06

## 2024-11-06 RX ORDER — DEXTROSE, SODIUM CHLORIDE, SODIUM LACTATE, POTASSIUM CHLORIDE, AND CALCIUM CHLORIDE 5; .6; .31; .03; .02 G/100ML; G/100ML; G/100ML; G/100ML; G/100ML
INJECTION, SOLUTION INTRAVENOUS CONTINUOUS
Status: DISCONTINUED | OUTPATIENT
Start: 2024-11-06 | End: 2024-11-08

## 2024-11-06 RX ORDER — 0.9 % SODIUM CHLORIDE 0.9 %
1000 INTRAVENOUS SOLUTION INTRAVENOUS ONCE
Status: COMPLETED | OUTPATIENT
Start: 2024-11-06 | End: 2024-11-06

## 2024-11-06 RX ORDER — ACETAMINOPHEN 325 MG/1
650 TABLET ORAL EVERY 6 HOURS PRN
Status: DISCONTINUED | OUTPATIENT
Start: 2024-11-06 | End: 2024-11-11 | Stop reason: HOSPADM

## 2024-11-06 RX ORDER — PROMETHAZINE HYDROCHLORIDE 25 MG/1
12.5 TABLET ORAL EVERY 6 HOURS PRN
Status: DISCONTINUED | OUTPATIENT
Start: 2024-11-06 | End: 2024-11-11 | Stop reason: HOSPADM

## 2024-11-06 RX ORDER — SODIUM CHLORIDE 9 MG/ML
INJECTION, SOLUTION INTRAVENOUS PRN
Status: DISCONTINUED | OUTPATIENT
Start: 2024-11-06 | End: 2024-11-11 | Stop reason: HOSPADM

## 2024-11-06 RX ORDER — ACETAMINOPHEN 650 MG/1
650 SUPPOSITORY RECTAL EVERY 6 HOURS PRN
Status: DISCONTINUED | OUTPATIENT
Start: 2024-11-06 | End: 2024-11-11 | Stop reason: HOSPADM

## 2024-11-06 RX ORDER — SODIUM CHLORIDE 0.9 % (FLUSH) 0.9 %
10 SYRINGE (ML) INJECTION PRN
Status: DISCONTINUED | OUTPATIENT
Start: 2024-11-06 | End: 2024-11-11 | Stop reason: HOSPADM

## 2024-11-06 RX ORDER — SODIUM CHLORIDE, SODIUM LACTATE, POTASSIUM CHLORIDE, CALCIUM CHLORIDE 600; 310; 30; 20 MG/100ML; MG/100ML; MG/100ML; MG/100ML
INJECTION, SOLUTION INTRAVENOUS CONTINUOUS
Status: DISCONTINUED | OUTPATIENT
Start: 2024-11-06 | End: 2024-11-06

## 2024-11-06 RX ORDER — LEVETIRACETAM 500 MG/5ML
1000 INJECTION, SOLUTION, CONCENTRATE INTRAVENOUS ONCE
Status: COMPLETED | OUTPATIENT
Start: 2024-11-06 | End: 2024-11-06

## 2024-11-06 RX ORDER — ENOXAPARIN SODIUM 100 MG/ML
40 INJECTION SUBCUTANEOUS DAILY
Status: DISCONTINUED | OUTPATIENT
Start: 2024-11-07 | End: 2024-11-11 | Stop reason: HOSPADM

## 2024-11-06 RX ADMIN — SODIUM CHLORIDE, SODIUM LACTATE, POTASSIUM CHLORIDE, CALCIUM CHLORIDE AND DEXTROSE MONOHYDRATE: 5; 600; 310; 30; 20 INJECTION, SOLUTION INTRAVENOUS at 22:49

## 2024-11-06 RX ADMIN — LEVETIRACETAM 1000 MG: 100 INJECTION INTRAVENOUS at 20:03

## 2024-11-06 RX ADMIN — POTASSIUM CHLORIDE 10 MEQ: 10 INJECTION, SOLUTION INTRAVENOUS at 21:54

## 2024-11-06 RX ADMIN — SODIUM CHLORIDE 1000 ML: 9 INJECTION, SOLUTION INTRAVENOUS at 20:06

## 2024-11-06 RX ADMIN — DEXTROSE MONOHYDRATE: 100 INJECTION, SOLUTION INTRAVENOUS at 20:19

## 2024-11-06 RX ADMIN — SODIUM CHLORIDE 2 MG: 9 INJECTION INTRAMUSCULAR; INTRAVENOUS; SUBCUTANEOUS at 19:59

## 2024-11-06 RX ADMIN — MAGNESIUM SULFATE HEPTAHYDRATE 2000 MG: 40 INJECTION, SOLUTION INTRAVENOUS at 20:09

## 2024-11-06 RX ADMIN — LAMOTRIGINE 125 MG: 100 TABLET ORAL at 20:57

## 2024-11-06 RX ADMIN — POTASSIUM CHLORIDE 10 MEQ: 10 INJECTION, SOLUTION INTRAVENOUS at 20:11

## 2024-11-06 RX ADMIN — POTASSIUM CHLORIDE 10 MEQ: 10 INJECTION, SOLUTION INTRAVENOUS at 22:45

## 2024-11-06 ASSESSMENT — PAIN - FUNCTIONAL ASSESSMENT: PAIN_FUNCTIONAL_ASSESSMENT: NONE - DENIES PAIN

## 2024-11-06 ASSESSMENT — LIFESTYLE VARIABLES
HOW MANY STANDARD DRINKS CONTAINING ALCOHOL DO YOU HAVE ON A TYPICAL DAY: PATIENT DOES NOT DRINK
HOW OFTEN DO YOU HAVE A DRINK CONTAINING ALCOHOL: NEVER

## 2024-11-07 ENCOUNTER — APPOINTMENT (OUTPATIENT)
Dept: MRI IMAGING | Age: 29
DRG: 053 | End: 2024-11-07
Payer: COMMERCIAL

## 2024-11-07 PROBLEM — E87.6 HYPOKALEMIA: Status: ACTIVE | Noted: 2024-11-07

## 2024-11-07 PROBLEM — E83.42 HYPOMAGNESEMIA: Status: ACTIVE | Noted: 2024-11-07

## 2024-11-07 PROBLEM — G40.019 PARTIAL IDIOPATHIC EPILEPSY WITH SEIZURES OF LOCALIZED ONSET, INTRACTABLE, WITHOUT STATUS EPILEPTICUS (HCC): Status: ACTIVE | Noted: 2024-11-07

## 2024-11-07 LAB
ALBUMIN SERPL-MCNC: 3.8 G/DL (ref 3.4–5)
ALBUMIN/GLOB SERPL: 1.8 {RATIO} (ref 1.1–2.2)
ALP SERPL-CCNC: 69 U/L (ref 40–129)
ALT SERPL-CCNC: 11 U/L (ref 10–40)
ANION GAP SERPL CALCULATED.3IONS-SCNC: 8 MMOL/L (ref 3–16)
AST SERPL-CCNC: 21 U/L (ref 15–37)
BASE EXCESS BLDV CALC-SCNC: 2.9 MMOL/L (ref -3–3)
BASOPHILS # BLD: 0 K/UL (ref 0–0.2)
BASOPHILS NFR BLD: 0.2 %
BILIRUB SERPL-MCNC: 0.4 MG/DL (ref 0–1)
BUN SERPL-MCNC: 9 MG/DL (ref 7–20)
CALCIUM SERPL-MCNC: 8.9 MG/DL (ref 8.3–10.6)
CHLORIDE SERPL-SCNC: 105 MMOL/L (ref 99–110)
CK SERPL-CCNC: 785 U/L (ref 39–308)
CO2 BLDV-SCNC: 66 MMOL/L
CO2 SERPL-SCNC: 26 MMOL/L (ref 21–32)
COHGB MFR BLDV: 3.2 % (ref 0–1.5)
CREAT SERPL-MCNC: 1 MG/DL (ref 0.9–1.3)
DEPRECATED RDW RBC AUTO: 12.9 % (ref 12.4–15.4)
EKG ATRIAL RATE: 70 BPM
EKG DIAGNOSIS: NORMAL
EKG P AXIS: 56 DEGREES
EKG P-R INTERVAL: 174 MS
EKG Q-T INTERVAL: 384 MS
EKG QRS DURATION: 94 MS
EKG QTC CALCULATION (BAZETT): 414 MS
EKG R AXIS: 49 DEGREES
EKG T AXIS: 26 DEGREES
EKG VENTRICULAR RATE: 70 BPM
EOSINOPHIL # BLD: 0 K/UL (ref 0–0.6)
EOSINOPHIL NFR BLD: 0.4 %
GFR SERPLBLD CREATININE-BSD FMLA CKD-EPI: >90 ML/MIN/{1.73_M2}
GLUCOSE BLD-MCNC: 100 MG/DL (ref 70–99)
GLUCOSE BLD-MCNC: 106 MG/DL (ref 70–99)
GLUCOSE BLD-MCNC: 82 MG/DL (ref 70–99)
GLUCOSE BLD-MCNC: 83 MG/DL (ref 70–99)
GLUCOSE BLD-MCNC: 83 MG/DL (ref 70–99)
GLUCOSE BLD-MCNC: 84 MG/DL (ref 70–99)
GLUCOSE BLD-MCNC: 93 MG/DL (ref 70–99)
GLUCOSE BLD-MCNC: 94 MG/DL (ref 70–99)
GLUCOSE BLD-MCNC: 95 MG/DL (ref 70–99)
GLUCOSE BLD-MCNC: 99 MG/DL (ref 70–99)
GLUCOSE SERPL-MCNC: 107 MG/DL (ref 70–99)
HCO3 BLDV-SCNC: 28.1 MMOL/L (ref 23–29)
HCT VFR BLD AUTO: 41.4 % (ref 40.5–52.5)
HGB BLD-MCNC: 14.1 G/DL (ref 13.5–17.5)
LEVETIRACETAM SERPL-MCNC: 2.1 UG/ML (ref 6–46)
LYMPHOCYTES # BLD: 2.9 K/UL (ref 1–5.1)
LYMPHOCYTES NFR BLD: 35.9 %
MAGNESIUM SERPL-MCNC: 1.8 MG/DL (ref 1.8–2.4)
MAGNESIUM SERPL-MCNC: 2.01 MG/DL (ref 1.8–2.4)
MCH RBC QN AUTO: 31.4 PG (ref 26–34)
MCHC RBC AUTO-ENTMCNC: 34.1 G/DL (ref 31–36)
MCV RBC AUTO: 91.9 FL (ref 80–100)
MEDICATION DOSE-MCNC: ABNORMAL
METHGB MFR BLDV: 0.4 %
MONOCYTES # BLD: 0.8 K/UL (ref 0–1.3)
MONOCYTES NFR BLD: 9.4 %
NEUTROPHILS # BLD: 4.4 K/UL (ref 1.7–7.7)
NEUTROPHILS NFR BLD: 54.1 %
O2 CT VFR BLDV CALC: 20 VOL %
O2 THERAPY: ABNORMAL
PCO2 BLDV: 44 MMHG (ref 40–50)
PERFORMED ON: ABNORMAL
PERFORMED ON: ABNORMAL
PERFORMED ON: NORMAL
PH BLDV: 7.41 [PH] (ref 7.35–7.45)
PLATELET # BLD AUTO: 278 K/UL (ref 135–450)
PMV BLD AUTO: 7.3 FL (ref 5–10.5)
PO2 BLDV: 119 MMHG (ref 25–40)
POTASSIUM SERPL-SCNC: 3.4 MMOL/L (ref 3.5–5.1)
POTASSIUM SERPL-SCNC: 3.5 MMOL/L (ref 3.5–5.1)
PROT SERPL-MCNC: 5.9 G/DL (ref 6.4–8.2)
RBC # BLD AUTO: 4.51 M/UL (ref 4.2–5.9)
SAO2 % BLDV: 99 %
SODIUM SERPL-SCNC: 139 MMOL/L (ref 136–145)
WBC # BLD AUTO: 8.1 K/UL (ref 4–11)

## 2024-11-07 PROCEDURE — 82550 ASSAY OF CK (CPK): CPT

## 2024-11-07 PROCEDURE — 97161 PT EVAL LOW COMPLEX 20 MIN: CPT

## 2024-11-07 PROCEDURE — 94760 N-INVAS EAR/PLS OXIMETRY 1: CPT

## 2024-11-07 PROCEDURE — 97535 SELF CARE MNGMENT TRAINING: CPT

## 2024-11-07 PROCEDURE — 93010 ELECTROCARDIOGRAM REPORT: CPT | Performed by: INTERNAL MEDICINE

## 2024-11-07 PROCEDURE — APPNB30 APP NON BILLABLE TIME 0-30 MINS

## 2024-11-07 PROCEDURE — 97165 OT EVAL LOW COMPLEX 30 MIN: CPT

## 2024-11-07 PROCEDURE — APPSS60 APP SPLIT SHARED TIME 46-60 MINUTES

## 2024-11-07 PROCEDURE — 85025 COMPLETE CBC W/AUTO DIFF WBC: CPT

## 2024-11-07 PROCEDURE — 84132 ASSAY OF SERUM POTASSIUM: CPT

## 2024-11-07 PROCEDURE — 2580000003 HC RX 258: Performed by: INTERNAL MEDICINE

## 2024-11-07 PROCEDURE — 99223 1ST HOSP IP/OBS HIGH 75: CPT | Performed by: PSYCHIATRY & NEUROLOGY

## 2024-11-07 PROCEDURE — 1200000000 HC SEMI PRIVATE

## 2024-11-07 PROCEDURE — 80053 COMPREHEN METABOLIC PANEL: CPT

## 2024-11-07 PROCEDURE — 36415 COLL VENOUS BLD VENIPUNCTURE: CPT

## 2024-11-07 PROCEDURE — 6370000000 HC RX 637 (ALT 250 FOR IP): Performed by: INTERNAL MEDICINE

## 2024-11-07 PROCEDURE — 6360000002 HC RX W HCPCS: Performed by: INTERNAL MEDICINE

## 2024-11-07 PROCEDURE — 82803 BLOOD GASES ANY COMBINATION: CPT

## 2024-11-07 PROCEDURE — 97530 THERAPEUTIC ACTIVITIES: CPT

## 2024-11-07 PROCEDURE — 95816 EEG AWAKE AND DROWSY: CPT | Performed by: PSYCHIATRY & NEUROLOGY

## 2024-11-07 PROCEDURE — 95819 EEG AWAKE AND ASLEEP: CPT

## 2024-11-07 PROCEDURE — 70551 MRI BRAIN STEM W/O DYE: CPT

## 2024-11-07 PROCEDURE — 83735 ASSAY OF MAGNESIUM: CPT

## 2024-11-07 RX ADMIN — LEVETIRACETAM 750 MG: 500 TABLET, FILM COATED ORAL at 20:31

## 2024-11-07 RX ADMIN — ENOXAPARIN SODIUM 40 MG: 100 INJECTION SUBCUTANEOUS at 08:52

## 2024-11-07 RX ADMIN — Medication 10 ML: at 08:53

## 2024-11-07 RX ADMIN — LAMOTRIGINE 125 MG: 100 TABLET ORAL at 20:31

## 2024-11-07 RX ADMIN — Medication 10 ML: at 20:31

## 2024-11-07 RX ADMIN — LEVETIRACETAM 750 MG: 500 TABLET, FILM COATED ORAL at 08:52

## 2024-11-07 RX ADMIN — SODIUM CHLORIDE, SODIUM LACTATE, POTASSIUM CHLORIDE, CALCIUM CHLORIDE AND DEXTROSE MONOHYDRATE: 5; 600; 310; 30; 20 INJECTION, SOLUTION INTRAVENOUS at 06:25

## 2024-11-07 RX ADMIN — LAMOTRIGINE 125 MG: 100 TABLET ORAL at 08:52

## 2024-11-07 RX ADMIN — POTASSIUM BICARBONATE 40 MEQ: 782 TABLET, EFFERVESCENT ORAL at 06:26

## 2024-11-07 NOTE — TELEPHONE ENCOUNTER
The medication is APPROVED 11/4/2024-11/3/2025.    If this requires a response please respond to the pool ( [facility] MEDICATION PRE-AUTH).      Thank you please advise patient.

## 2024-11-07 NOTE — PROCEDURES
Patient: Nahid Hardwick    MR Number: [id number]  YOB: 1995  Date of Visit: 11/7/2024    Clinical History:  The patient is a 29 y.o. years old male with history of epilepsy and breakthrough seizure.  Medications reviewed.      Method:  The EEG was performed utilizing the international 10/20 of electrode placements of both referential and bipolar montages. The patient was awake and drowsy through out the recording.  Photic stimulation was performed.    Findings:  The background of the EEG showed normal alpha posterior background of 8-9  HZ and amplitude of 20-40 UV. This background was symmetric, waxing and waning, and reactive with eye opening and closure. As the patient became drowsy, generalized diffuse slowing was seen through recording at 6-7 HZ.  This generalized slowing was symmetric, non rhythmical, and continuous.  Intermittent but infrequent generalized spike and wave discharge was seen at the rate of 1 2 Hz/s lasting for 1 or 2 seconds throughout the recording.  Photic stimulation did not activate EEG.     Impression:  This EEG  is abnormal.  The generalized epileptiform discharges are suggestive of underlying idiopathic generalized epilepsy.   No focal or lateralized abnormalities.     Ramu Beasley MD      Board certified in clinical neurophysiology

## 2024-11-07 NOTE — CARE COORDINATION
Case Management Assessment  Initial Evaluation    Date/Time of Evaluation: 11/7/2024 11:45 AM  Assessment Completed by: YECENIA Dey    If patient is discharged prior to next notation, then this note serves as note for discharge by case management.    Patient Name: Nahid Hardwick                   YOB: 1995  Diagnosis: Hypokalemia [E87.6]  Hypomagnesemia [E83.42]  Seizure (HCC) [R56.9]  Breakthrough seizure (HCC) [G40.919]                   Date / Time: 11/6/2024  6:10 PM    Patient Admission Status: Inpatient   Readmission Risk (Low < 19, Mod (19-27), High > 27): Readmission Risk Score: 6.9    Current PCP: Kenya De Anda APRN - CNP  PCP verified by CM? (P) Yes    Chart Reviewed: Yes      History Provided by: (P) Patient  Patient Orientation: (P) Alert and Oriented    Patient Cognition: (P) Alert    Hospitalization in the last 30 days (Readmission):  No    If yes, Readmission Assessment in  Navigator will be completed.    Advance Directives:      Code Status: Full Code   Patient's Primary Decision Maker is: (P) Legal Next of Kin    Primary Decision Maker: Crys Hardwick - Parent - [phone]    Discharge Planning:    Patient lives with: (P) Family Members Type of Home: (P) House  Primary Care Giver: (P) Self  Patient Support Systems include: (P) Family Members   Current Financial resources: (P) None  Current community resources: (P) None  Current services prior to admission: (P) None            Current DME:              Type of Home Care services:  (P) PT, OT    ADLS  Prior functional level: (P) Independent in ADLs/IADLs (Does not drive)  Current functional level: (P) Independent in ADLs/IADLs    PT AM-PAC: 24 /24  OT AM-PAC: 24 /24    Family can provide assistance at DC: (P) Yes  Would you like Case Management to discuss the discharge plan with any other family members/significant others, and if so, who? (P) Yes (Parent Crys)  Plans to Return to Present Housing: (P) Yes  Other

## 2024-11-07 NOTE — ED PROVIDER NOTES
In addition to the advanced practice provider, I personally saw Nahid Hardwick and performed a substantive portion of the visit including all aspects of the medical decision making.    Medical Decision Making  Patient seen and evaluated at bedside.  Briefly, presenting with breakthrough seizure.  Lab workup does show significant hypokalemia and hypomagnesemia which will be replaced intravenously in the ED.  Keppra and Lamictal levels sent for analysis.  Patient did have 1 seizure-like episode in the ED which resolved after 2 mg of IV Ativan.  He was loaded up with IV Keppra.  CT head negative for acute intracranial hemorrhage.  CT facial bones negative for acute fracture.  CT cervical spine negative for acute fracture.  I will admit patient for continued monitoring, seizure precautions, neurology evaluation and possible repeat EEG as needed.    The total critical care time I independently spent while evaluating and treating this patient was 27 minutes.  This excludes time spent doing separately billable procedures.  This includes time at the bedside, data interpretation, medication management, obtaining critical history from collateral sources if the patient is unable to provide it directly, and physician consultation.  Specifics of interventions taken and potentially life-threatening diagnostic considerations are listed above in the medical decision making.  If this was a shared visit with an MIA, the time in this attestation is non-concurrent critical care time out of the total shared critical care time provided by the MIA and myself.    EKG  Normal sinus rhythm with no acute ST elevations.  Ventricular to 70 bpm.  Normal axis.  QRS duration of 94.  QTc of 414.    SEP-1  Is this patient to be included in the SEP-1 Core Measure due to severe sepsis or septic shock?   No     Exclusion criteria - the patient is NOT to be included for SEP-1 Core Measure due to:  2+ SIRS criteria are not met    Screenings     Brandon 
Tx.):    The EKG as documented by my attending, please see his note for further details    CBC shows no evidence of leukocytosis or anemia.  BMP shows a hypokalemia of 2.8 which will be replaced IV, he is at high hypoglycemia 68, he will be given D10.  He has a hypomagnesia level of 1.28 this will be replaced IV as well.    CT of head, cervical spine and facial bones are negative.    Due to the patient's electrolyte abnormalities, and seizure with postictal state I feel she would benefit from admission, hospitalist has been paged for admission, mother is updated, agreeable plan, stable for admission.    I am the Primary Clinician of Record.  FINAL IMPRESSION      1. Breakthrough seizure (HCC)    2. Hypokalemia    3. Hypomagnesemia          DISPOSITION/PLAN     DISPOSITION Admitted 11/06/2024 08:25:33 PM           PATIENT REFERRED TO:  No follow-up provider specified.    DISCHARGE MEDICATIONS:  Current Discharge Medication List          DISCONTINUED MEDICATIONS:  Current Discharge Medication List                 (Please note that portions of this note were completed with a voice recognition program.  Efforts were made to edit the dictations but occasionally words are mis-transcribed.)    Gavi Brown PA-C (electronically signed)        Gavi Brown PA-C  11/06/24 [id number]

## 2024-11-07 NOTE — H&P
Hospital Medicine History & Physical      PCP: Kenya De Anda, MARION - CNP    Date of Admission: 11/6/2024    Date of Service: Pt seen/examined on 11/6/2024    Pt seen/examined face to face on and admitted as inpatient with expected LOS to be two days but can change depending on diagnostic work up and treatment response.     Chief Complaint:    Chief Complaint   Patient presents with    Seizures     [facility] from home due to full-body seizure that lasted 2 mins and was witnessed by grandma. Last seizure was 2 months ago. Take lamictal. Pt post-ictal at this time. Pt also hit right side of head, hematoma noted.             ASSESSMENT AND PLAN:    Active Hospital Problems    Diagnosis Date Noted    Seizure (HCC) [R56.9] 11/06/2024     Suspected seizure:  Keppra and Lamictal level pending  Patient had an MRI without contrast back in 2009 that showed normal IAC with no focal mass or malformation  Restart patient's home medication, increase Keppra 750 twice daily and Lamictal 125 twice daily  Neurology consulted, appreciated     Hypoglycemia: D5 LR initiated with titration    Hypokalemia: Replaced    Hypomagnesemia, replaced        .Due to the above diagnosis makes the patient higher risk for morbidity and mortality requiring testing and treatment      Discussion with the primary ER physician in regards to symptoms, history, physical exam, diagnosis and treatment, collaborative decision was to admit the patient.    Diet: Advance as tolerated    DVT Prophylaxis: lovenox    Dispo:   Expected LOS of two days      History Of Present Illness:      29 y.o. 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

## 2024-11-07 NOTE — PLAN OF CARE
Problem: Discharge Planning  Goal: Discharge to home or other facility with appropriate resources  Outcome: Progressing     Problem: Safety - Adult  Goal: Free from fall injury  Outcome: Progressing     Problem: Neurosensory - Adult  Goal: Achieves stable or improved neurological status  Outcome: Progressing  Goal: Absence of seizures  Outcome: Progressing  Goal: Remains free of injury related to seizures activity  Outcome: Progressing     Problem: Metabolic/Fluid and Electrolytes - Adult  Goal: Electrolytes maintained within normal limits  Outcome: Progressing  Goal: Glucose maintained within prescribed range  Outcome: Progressing     Problem: Skin/Tissue Integrity  Goal: Absence of new skin breakdown  Description: 1.  Monitor for areas of redness and/or skin breakdown  2.  Assess vascular access sites hourly  3.  Every 4-6 hours minimum:  Change oxygen saturation probe site  4.  Every 4-6 hours:  If on nasal continuous positive airway pressure, respiratory therapy assess nares and determine need for appliance change or resting period.  Outcome: Progressing

## 2024-11-07 NOTE — ED NOTES
How does patient ambulate?   []Low Fall Risk (ambulates by themselves without support)  [x]Stand by assist   []Contact Guard   []Front wheel walker  []Wheelchair   []Steady  []Bed bound  []History of Lower Extremity Amputation  []Unknown, did not assess in the emergency department   How does patient take pills?  [x]Whole with Water  []Crushed in applesauce  []Crushed in pudding  []Other  []Unknown no oral medications were given in the ED  Is patient alert?   []Alert  [x]Drowsy but responds to voice  []Doesn't respond to voice but responds to painful stimuli  []Unresponsive  Is patient oriented?   [x]To person  []To place  []To time  []To situation  []Confused  []Agitated  []Follows commands  If patient is disoriented or from a Skill Nursing Facility has family been notified of admission?   []Yes   []No  Patient belongings?   []Cell phone  []Wallet   []Dentures  [x]Clothing  Any specific patient or family belongings/needs/dynamics?   Patient is MRDD, grandmother is at bedside   Miscellaneous comments/pending orders?  Inpatient orders      If there are any additional questions please reach out to the Emergency Department.

## 2024-11-07 NOTE — CONSULTS
In patient Neurology consult    [facility]  MD Nahid Clark  1995    Date of Service: 11/7/2024    Referring Physician: Carlos Lawson MD      Reason for the consult and CC: Breakthrough seizure    HPI:   The patient is a 29 y.o.  years old male with past medical history of seizure, development delay medical problem who comes to the hospital with breakthrough seizure.  Degree severe duration minutes.  Description generalized motor seizure.  Patient was noted to have aggression and agitation postictally.  Patient does not recall events.  Per report he might of fallen and hit his head.  EMS was called and patient was brought to the emergency room further evaluation.  In the emergency room CT head showed no acute intracranial abnormality.  Other imaging showed negative for acute fractures.  Lab workup showed Keppra level 2.1, Lamictal is pending.  Other lab workup showed elevated CK, hypokalemia and hypomagnesemia.  During his course in the emergency room patient had another episode and was given IV Ativan.  He was loaded up with Keppra, and admitted to the hospital for further evaluation.  This morning patient reports he is back to his baseline.  Chart reviewed patient is on Keppra 500 mg twice daily and Lamictal 125 mg twice daily.  Patient reports he ran out of his medications and might have missed a few of his doses.  Patient is unsure when his last seizure was.  Chart review shows patient was hospitalized breakthrough seizure in May 2023.  Keppra level at that time was also undetectable.  Today he denies any headaches, vision changes, extremity weakness, nausea, vomiting, abdominal pain or paresthesias.  Other review of systems unremarkable       Constitutional:   Vitals:    11/06/24 2045 11/06/24 2133 11/07/24 0515 11/07/24 0822   BP: 113/72 114/64  133/83   Pulse: 71 89  73   Resp: 17 18  16   Temp:  98.1 °F (36.7 °C)  98.5 °F (36.9 °C)   TempSrc:  Oral  Oral   SpO2:

## 2024-11-08 ENCOUNTER — APPOINTMENT (OUTPATIENT)
Dept: MRI IMAGING | Age: 29
DRG: 053 | End: 2024-11-08
Payer: COMMERCIAL

## 2024-11-08 LAB
ALBUMIN SERPL-MCNC: 3.7 G/DL (ref 3.4–5)
ALBUMIN/GLOB SERPL: 1.5 {RATIO} (ref 1.1–2.2)
ALP SERPL-CCNC: 67 U/L (ref 40–129)
ALT SERPL-CCNC: 11 U/L (ref 10–40)
ANION GAP SERPL CALCULATED.3IONS-SCNC: 12 MMOL/L (ref 3–16)
AST SERPL-CCNC: 18 U/L (ref 15–37)
BASOPHILS # BLD: 0 K/UL (ref 0–0.2)
BASOPHILS NFR BLD: 0.3 %
BILIRUB SERPL-MCNC: 0.3 MG/DL (ref 0–1)
BUN SERPL-MCNC: 9 MG/DL (ref 7–20)
CALCIUM SERPL-MCNC: 9.1 MG/DL (ref 8.3–10.6)
CHLORIDE SERPL-SCNC: 106 MMOL/L (ref 99–110)
CK SERPL-CCNC: 595 U/L (ref 39–308)
CO2 SERPL-SCNC: 20 MMOL/L (ref 21–32)
CORTIS AM PEAK SERPL-MCNC: 3.2 UG/DL (ref 4.3–22.4)
CREAT SERPL-MCNC: 1.1 MG/DL (ref 0.9–1.3)
DEPRECATED RDW RBC AUTO: 12.8 % (ref 12.4–15.4)
EOSINOPHIL # BLD: 0.1 K/UL (ref 0–0.6)
EOSINOPHIL NFR BLD: 0.7 %
GFR SERPLBLD CREATININE-BSD FMLA CKD-EPI: >90 ML/MIN/{1.73_M2}
GLUCOSE BLD-MCNC: 107 MG/DL (ref 70–99)
GLUCOSE BLD-MCNC: 66 MG/DL (ref 70–99)
GLUCOSE BLD-MCNC: 68 MG/DL (ref 70–99)
GLUCOSE BLD-MCNC: 68 MG/DL (ref 70–99)
GLUCOSE BLD-MCNC: 84 MG/DL (ref 70–99)
GLUCOSE BLD-MCNC: 84 MG/DL (ref 70–99)
GLUCOSE BLD-MCNC: 93 MG/DL (ref 70–99)
GLUCOSE SERPL-MCNC: 96 MG/DL (ref 70–99)
HCT VFR BLD AUTO: 43.5 % (ref 40.5–52.5)
HGB BLD-MCNC: 14.7 G/DL (ref 13.5–17.5)
LAMOTRIGINE SERPL-MCNC: 7.6 UG/ML (ref 3–15)
LAMOTRIGINE SERPL-MCNC: 8.7 UG/ML (ref 3–15)
LYMPHOCYTES # BLD: 3.3 K/UL (ref 1–5.1)
LYMPHOCYTES NFR BLD: 48.1 %
MAGNESIUM SERPL-MCNC: 1.94 MG/DL (ref 1.8–2.4)
MCH RBC QN AUTO: 31.6 PG (ref 26–34)
MCHC RBC AUTO-ENTMCNC: 33.7 G/DL (ref 31–36)
MCV RBC AUTO: 93.6 FL (ref 80–100)
MONOCYTES # BLD: 0.8 K/UL (ref 0–1.3)
MONOCYTES NFR BLD: 10.8 %
NEUTROPHILS # BLD: 2.8 K/UL (ref 1.7–7.7)
NEUTROPHILS NFR BLD: 40.1 %
PERFORMED ON: ABNORMAL
PERFORMED ON: NORMAL
PLATELET # BLD AUTO: 256 K/UL (ref 135–450)
PMV BLD AUTO: 7.5 FL (ref 5–10.5)
POTASSIUM SERPL-SCNC: 3.6 MMOL/L (ref 3.5–5.1)
PROT SERPL-MCNC: 6.1 G/DL (ref 6.4–8.2)
RBC # BLD AUTO: 4.65 M/UL (ref 4.2–5.9)
SODIUM SERPL-SCNC: 138 MMOL/L (ref 136–145)
WBC # BLD AUTO: 7 K/UL (ref 4–11)

## 2024-11-08 PROCEDURE — 6370000000 HC RX 637 (ALT 250 FOR IP): Performed by: INTERNAL MEDICINE

## 2024-11-08 PROCEDURE — 80053 COMPREHEN METABOLIC PANEL: CPT

## 2024-11-08 PROCEDURE — 85025 COMPLETE CBC W/AUTO DIFF WBC: CPT

## 2024-11-08 PROCEDURE — 82550 ASSAY OF CK (CPK): CPT

## 2024-11-08 PROCEDURE — 1200000000 HC SEMI PRIVATE

## 2024-11-08 PROCEDURE — 2580000003 HC RX 258: Performed by: INTERNAL MEDICINE

## 2024-11-08 PROCEDURE — 99233 SBSQ HOSP IP/OBS HIGH 50: CPT | Performed by: PSYCHIATRY & NEUROLOGY

## 2024-11-08 PROCEDURE — 36415 COLL VENOUS BLD VENIPUNCTURE: CPT

## 2024-11-08 PROCEDURE — 2580000003 HC RX 258: Performed by: NURSE PRACTITIONER

## 2024-11-08 PROCEDURE — APPNB30 APP NON BILLABLE TIME 0-30 MINS

## 2024-11-08 PROCEDURE — 82533 TOTAL CORTISOL: CPT

## 2024-11-08 PROCEDURE — 70552 MRI BRAIN STEM W/DYE: CPT

## 2024-11-08 PROCEDURE — 6360000004 HC RX CONTRAST MEDICATION: Performed by: NURSE PRACTITIONER

## 2024-11-08 PROCEDURE — 6360000002 HC RX W HCPCS: Performed by: INTERNAL MEDICINE

## 2024-11-08 PROCEDURE — 83735 ASSAY OF MAGNESIUM: CPT

## 2024-11-08 PROCEDURE — A9577 INJ MULTIHANCE: HCPCS | Performed by: NURSE PRACTITIONER

## 2024-11-08 PROCEDURE — APPSS30 APP SPLIT SHARED TIME 16-30 MINUTES

## 2024-11-08 RX ORDER — MAGNESIUM SULFATE IN WATER 40 MG/ML
2000 INJECTION, SOLUTION INTRAVENOUS PRN
Status: DISCONTINUED | OUTPATIENT
Start: 2024-11-08 | End: 2024-11-11 | Stop reason: HOSPADM

## 2024-11-08 RX ORDER — GLUCAGON 1 MG/ML
1 KIT INJECTION PRN
Status: DISCONTINUED | OUTPATIENT
Start: 2024-11-08 | End: 2024-11-11 | Stop reason: HOSPADM

## 2024-11-08 RX ORDER — DEXTROSE MONOHYDRATE 100 MG/ML
INJECTION, SOLUTION INTRAVENOUS CONTINUOUS PRN
Status: DISCONTINUED | OUTPATIENT
Start: 2024-11-08 | End: 2024-11-11 | Stop reason: HOSPADM

## 2024-11-08 RX ADMIN — LEVETIRACETAM 750 MG: 500 TABLET, FILM COATED ORAL at 20:01

## 2024-11-08 RX ADMIN — SODIUM CHLORIDE, SODIUM LACTATE, POTASSIUM CHLORIDE, CALCIUM CHLORIDE AND DEXTROSE MONOHYDRATE: 5; 600; 310; 30; 20 INJECTION, SOLUTION INTRAVENOUS at 03:01

## 2024-11-08 RX ADMIN — ENOXAPARIN SODIUM 40 MG: 100 INJECTION SUBCUTANEOUS at 09:19

## 2024-11-08 RX ADMIN — LEVETIRACETAM 750 MG: 500 TABLET, FILM COATED ORAL at 09:19

## 2024-11-08 RX ADMIN — Medication 10 ML: at 20:01

## 2024-11-08 RX ADMIN — GADOBENATE DIMEGLUMINE 15 ML: 529 INJECTION, SOLUTION INTRAVENOUS at 13:06

## 2024-11-08 RX ADMIN — LAMOTRIGINE 125 MG: 100 TABLET ORAL at 20:01

## 2024-11-08 RX ADMIN — LAMOTRIGINE 125 MG: 100 TABLET ORAL at 09:19

## 2024-11-08 RX ADMIN — Medication 10 ML: at 09:28

## 2024-11-08 NOTE — PLAN OF CARE
Problem: Discharge Planning  Goal: Discharge to home or other facility with appropriate resources  Outcome: Progressing     Problem: Safety - Adult  Goal: Free from fall injury  Outcome: Progressing     Problem: Neurosensory - Adult  Goal: Achieves stable or improved neurological status  Outcome: Progressing  Flowsheets (Taken 11/8/2024 0800 by Matthew Jose, RN)  Achieves stable or improved neurological status: Assess for and report changes in neurological status  Goal: Absence of seizures  Outcome: Progressing  Flowsheets (Taken 11/8/2024 0800 by Matthew Jose, RN)  Absence of seizures: Monitor for seizure activity.  If seizure occurs, document type and location of movements and any associated apnea  Goal: Remains free of injury related to seizures activity  Outcome: Progressing  Flowsheets (Taken 11/8/2024 0800 by Matthew Jose, RN)  Remains free of injury related to seizure activity: Maintain airway, patient safety  and administer oxygen as ordered     Problem: Metabolic/Fluid and Electrolytes - Adult  Goal: Electrolytes maintained within normal limits  Outcome: Progressing  Goal: Glucose maintained within prescribed range  Outcome: Progressing     Problem: Skin/Tissue Integrity  Goal: Absence of new skin breakdown  Description: 1.  Monitor for areas of redness and/or skin breakdown  2.  Assess vascular access sites hourly  3.  Every 4-6 hours minimum:  Change oxygen saturation probe site  4.  Every 4-6 hours:  If on nasal continuous positive airway pressure, respiratory therapy assess nares and determine need for appliance change or resting period.  Outcome: Progressing

## 2024-11-09 LAB
ALBUMIN SERPL-MCNC: 4.1 G/DL (ref 3.4–5)
ALBUMIN/GLOB SERPL: 1.5 {RATIO} (ref 1.1–2.2)
ALP SERPL-CCNC: 77 U/L (ref 40–129)
ALT SERPL-CCNC: 10 U/L (ref 10–40)
ANION GAP SERPL CALCULATED.3IONS-SCNC: 10 MMOL/L (ref 3–16)
AST SERPL-CCNC: 17 U/L (ref 15–37)
BASOPHILS # BLD: 0 K/UL (ref 0–0.2)
BASOPHILS NFR BLD: 0.2 %
BILIRUB SERPL-MCNC: 0.4 MG/DL (ref 0–1)
BUN SERPL-MCNC: 9 MG/DL (ref 7–20)
CALCIUM SERPL-MCNC: 9.8 MG/DL (ref 8.3–10.6)
CHLORIDE SERPL-SCNC: 102 MMOL/L (ref 99–110)
CO2 SERPL-SCNC: 26 MMOL/L (ref 21–32)
CREAT SERPL-MCNC: 1.1 MG/DL (ref 0.9–1.3)
DEPRECATED RDW RBC AUTO: 12.6 % (ref 12.4–15.4)
EOSINOPHIL # BLD: 0 K/UL (ref 0–0.6)
EOSINOPHIL NFR BLD: 0.7 %
GFR SERPLBLD CREATININE-BSD FMLA CKD-EPI: >90 ML/MIN/{1.73_M2}
GLUCOSE BLD-MCNC: 70 MG/DL (ref 70–99)
GLUCOSE BLD-MCNC: 74 MG/DL (ref 70–99)
GLUCOSE BLD-MCNC: 74 MG/DL (ref 70–99)
GLUCOSE BLD-MCNC: 80 MG/DL (ref 70–99)
GLUCOSE BLD-MCNC: 95 MG/DL (ref 70–99)
GLUCOSE SERPL-MCNC: 77 MG/DL (ref 70–99)
HCT VFR BLD AUTO: 48.1 % (ref 40.5–52.5)
HGB BLD-MCNC: 16.1 G/DL (ref 13.5–17.5)
LYMPHOCYTES # BLD: 2.6 K/UL (ref 1–5.1)
LYMPHOCYTES NFR BLD: 43.7 %
MAGNESIUM SERPL-MCNC: 2.07 MG/DL (ref 1.8–2.4)
MCH RBC QN AUTO: 30.7 PG (ref 26–34)
MCHC RBC AUTO-ENTMCNC: 33.4 G/DL (ref 31–36)
MCV RBC AUTO: 91.8 FL (ref 80–100)
MONOCYTES # BLD: 0.7 K/UL (ref 0–1.3)
MONOCYTES NFR BLD: 12.6 %
NEUTROPHILS # BLD: 2.5 K/UL (ref 1.7–7.7)
NEUTROPHILS NFR BLD: 42.8 %
PERFORMED ON: NORMAL
PLATELET # BLD AUTO: 283 K/UL (ref 135–450)
PMV BLD AUTO: 7.8 FL (ref 5–10.5)
POTASSIUM SERPL-SCNC: 3.6 MMOL/L (ref 3.5–5.1)
PROT SERPL-MCNC: 6.9 G/DL (ref 6.4–8.2)
RBC # BLD AUTO: 5.24 M/UL (ref 4.2–5.9)
SODIUM SERPL-SCNC: 138 MMOL/L (ref 136–145)
WBC # BLD AUTO: 6 K/UL (ref 4–11)

## 2024-11-09 PROCEDURE — 1200000000 HC SEMI PRIVATE

## 2024-11-09 PROCEDURE — 85025 COMPLETE CBC W/AUTO DIFF WBC: CPT

## 2024-11-09 PROCEDURE — 2580000003 HC RX 258: Performed by: INTERNAL MEDICINE

## 2024-11-09 PROCEDURE — 6370000000 HC RX 637 (ALT 250 FOR IP): Performed by: INTERNAL MEDICINE

## 2024-11-09 PROCEDURE — 6360000002 HC RX W HCPCS: Performed by: INTERNAL MEDICINE

## 2024-11-09 PROCEDURE — 83735 ASSAY OF MAGNESIUM: CPT

## 2024-11-09 PROCEDURE — 80053 COMPREHEN METABOLIC PANEL: CPT

## 2024-11-09 RX ORDER — COSYNTROPIN 0.25 MG/ML
250 INJECTION, POWDER, FOR SOLUTION INTRAMUSCULAR; INTRAVENOUS ONCE
Status: COMPLETED | OUTPATIENT
Start: 2024-11-10 | End: 2024-11-10

## 2024-11-09 RX ADMIN — Medication 10 ML: at 20:07

## 2024-11-09 RX ADMIN — ENOXAPARIN SODIUM 40 MG: 100 INJECTION SUBCUTANEOUS at 08:59

## 2024-11-09 RX ADMIN — LAMOTRIGINE 125 MG: 100 TABLET ORAL at 08:57

## 2024-11-09 RX ADMIN — LAMOTRIGINE 125 MG: 100 TABLET ORAL at 20:07

## 2024-11-09 RX ADMIN — LEVETIRACETAM 750 MG: 500 TABLET, FILM COATED ORAL at 20:06

## 2024-11-09 RX ADMIN — LEVETIRACETAM 750 MG: 500 TABLET, FILM COATED ORAL at 08:57

## 2024-11-09 RX ADMIN — Medication 10 ML: at 08:58

## 2024-11-09 NOTE — PLAN OF CARE
Problem: Discharge Planning  Goal: Discharge to home or other facility with appropriate resources  11/8/2024 1835 by Olivia Loja RN  Outcome: Progressing     Problem: Safety - Adult  Goal: Free from fall injury  11/9/2024 0143 by Padmini Schrader RN  Outcome: Progressing  11/8/2024 1835 by Olivia Loja RN  Outcome: Progressing     Problem: Neurosensory - Adult  Goal: Achieves stable or improved neurological status  11/8/2024 1835 by Olivia Loja RN  Outcome: Progressing  Flowsheets (Taken 11/8/2024 0800 by Matthew Jose RN)  Achieves stable or improved neurological status: Assess for and report changes in neurological status  Goal: Absence of seizures  11/8/2024 1835 by Olivia Loja RN  Outcome: Progressing  Flowsheets (Taken 11/8/2024 0800 by Matthew Jose RN)  Absence of seizures: Monitor for seizure activity.  If seizure occurs, document type and location of movements and any associated apnea  Goal: Remains free of injury related to seizures activity  11/8/2024 1835 by Olivia Loja RN  Outcome: Progressing  Flowsheets (Taken 11/8/2024 0800 by Matthew Jose RN)  Remains free of injury related to seizure activity: Maintain airway, patient safety  and administer oxygen as ordered     Problem: Metabolic/Fluid and Electrolytes - Adult  Goal: Electrolytes maintained within normal limits  11/8/2024 1835 by Olivia Loja RN  Outcome: Progressing  Goal: Glucose maintained within prescribed range  11/8/2024 1835 by Olivia Loja RN  Outcome: Progressing     Problem: Skin/Tissue Integrity  Goal: Absence of new skin breakdown  Description: 1.  Monitor for areas of redness and/or skin breakdown  2.  Assess vascular access sites hourly  3.  Every 4-6 hours minimum:  Change oxygen saturation probe site  4.  Every 4-6 hours:  If on nasal continuous positive airway pressure, respiratory therapy assess nares and determine need for appliance change or resting period.  11/8/2024 1835 by Olivia Loja

## 2024-11-09 NOTE — PLAN OF CARE
Problem: Discharge Planning  Goal: Discharge to home or other facility with appropriate resources  Outcome: Progressing     Problem: Safety - Adult  Goal: Free from fall injury  11/9/2024 0847 by Cyndie Fatima RN  Outcome: Progressing  11/9/2024 0144 by Padmini Schrader RN  Outcome: Progressing  11/9/2024 0143 by Padmini Schrader RN  Outcome: Progressing     Problem: Neurosensory - Adult  Goal: Achieves stable or improved neurological status  11/9/2024 0847 by Cyndie Fatima RN  Outcome: Progressing  11/9/2024 0144 by Padmini Schrader RN  Outcome: Progressing  Goal: Absence of seizures  Outcome: Progressing  Goal: Remains free of injury related to seizures activity  Outcome: Progressing     Problem: Metabolic/Fluid and Electrolytes - Adult  Goal: Electrolytes maintained within normal limits  Outcome: Progressing  Goal: Glucose maintained within prescribed range  Outcome: Progressing     Problem: Skin/Tissue Integrity  Goal: Absence of new skin breakdown  Description: 1.  Monitor for areas of redness and/or skin breakdown  2.  Assess vascular access sites hourly  3.  Every 4-6 hours minimum:  Change oxygen saturation probe site  4.  Every 4-6 hours:  If on nasal continuous positive airway pressure, respiratory therapy assess nares and determine need for appliance change or resting period.  Outcome: Progressing

## 2024-11-09 NOTE — PLAN OF CARE
Problem: Discharge Planning  Goal: Discharge to home or other facility with appropriate resources  11/8/2024 1835 by Olivia Loja RN  Outcome: Progressing     Problem: Safety - Adult  Goal: Free from fall injury  11/9/2024 0144 by Padmini Schrader RN  Outcome: Progressing  11/9/2024 0143 by Padmini Schrader RN  Outcome: Progressing  11/8/2024 1835 by Olivia Loja RN  Outcome: Progressing     Problem: Neurosensory - Adult  Goal: Achieves stable or improved neurological status  11/9/2024 0144 by Padmini Schrader RN  Outcome: Progressing  11/8/2024 1835 by Olivia Loja RN  Outcome: Progressing  Flowsheets (Taken 11/8/2024 0800 by Matthew Jose RN)  Achieves stable or improved neurological status: Assess for and report changes in neurological status  Goal: Absence of seizures  11/8/2024 1835 by Olivia Loja RN  Outcome: Progressing  Flowsheets (Taken 11/8/2024 0800 by Matthew Jose RN)  Absence of seizures: Monitor for seizure activity.  If seizure occurs, document type and location of movements and any associated apnea  Goal: Remains free of injury related to seizures activity  11/8/2024 1835 by Olivia Loja RN  Outcome: Progressing  Flowsheets (Taken 11/8/2024 0800 by Matthew Jose RN)  Remains free of injury related to seizure activity: Maintain airway, patient safety  and administer oxygen as ordered     Problem: Metabolic/Fluid and Electrolytes - Adult  Goal: Electrolytes maintained within normal limits  11/8/2024 1835 by Olivia Loja RN  Outcome: Progressing  Goal: Glucose maintained within prescribed range  11/8/2024 1835 by Olivia Loja RN  Outcome: Progressing     Problem: Skin/Tissue Integrity  Goal: Absence of new skin breakdown  Description: 1.  Monitor for areas of redness and/or skin breakdown  2.  Assess vascular access sites hourly  3.  Every 4-6 hours minimum:  Change oxygen saturation probe site  4.  Every 4-6 hours:  If on nasal continuous positive airway pressure, respiratory

## 2024-11-10 LAB
ANION GAP SERPL CALCULATED.3IONS-SCNC: 13 MMOL/L (ref 3–16)
BUN SERPL-MCNC: 12 MG/DL (ref 7–20)
CALCIUM SERPL-MCNC: 9.9 MG/DL (ref 8.3–10.6)
CHLORIDE SERPL-SCNC: 102 MMOL/L (ref 99–110)
CK SERPL-CCNC: 432 U/L (ref 39–308)
CO2 SERPL-SCNC: 22 MMOL/L (ref 21–32)
CORTIS SERPL-MCNC: 14.4 UG/DL
CORTIS SERPL-MCNC: 29.2 UG/DL
CREAT SERPL-MCNC: 1.2 MG/DL (ref 0.9–1.3)
GFR SERPLBLD CREATININE-BSD FMLA CKD-EPI: 84 ML/MIN/{1.73_M2}
GLUCOSE BLD-MCNC: 75 MG/DL (ref 70–99)
GLUCOSE BLD-MCNC: 77 MG/DL (ref 70–99)
GLUCOSE BLD-MCNC: 77 MG/DL (ref 70–99)
GLUCOSE BLD-MCNC: 83 MG/DL (ref 70–99)
GLUCOSE BLD-MCNC: 84 MG/DL (ref 70–99)
GLUCOSE BLD-MCNC: 88 MG/DL (ref 70–99)
GLUCOSE BLD-MCNC: 90 MG/DL (ref 70–99)
GLUCOSE SERPL-MCNC: 92 MG/DL (ref 70–99)
MAGNESIUM SERPL-MCNC: 2.28 MG/DL (ref 1.8–2.4)
PERFORMED ON: NORMAL
POTASSIUM SERPL-SCNC: 4 MMOL/L (ref 3.5–5.1)
SODIUM SERPL-SCNC: 137 MMOL/L (ref 136–145)

## 2024-11-10 PROCEDURE — 6360000002 HC RX W HCPCS: Performed by: NURSE PRACTITIONER

## 2024-11-10 PROCEDURE — 2580000003 HC RX 258: Performed by: INTERNAL MEDICINE

## 2024-11-10 PROCEDURE — 36415 COLL VENOUS BLD VENIPUNCTURE: CPT

## 2024-11-10 PROCEDURE — 82550 ASSAY OF CK (CPK): CPT

## 2024-11-10 PROCEDURE — 80048 BASIC METABOLIC PNL TOTAL CA: CPT

## 2024-11-10 PROCEDURE — 82533 TOTAL CORTISOL: CPT

## 2024-11-10 PROCEDURE — 6370000000 HC RX 637 (ALT 250 FOR IP): Performed by: INTERNAL MEDICINE

## 2024-11-10 PROCEDURE — 6360000002 HC RX W HCPCS: Performed by: INTERNAL MEDICINE

## 2024-11-10 PROCEDURE — 1200000000 HC SEMI PRIVATE

## 2024-11-10 PROCEDURE — 83735 ASSAY OF MAGNESIUM: CPT

## 2024-11-10 RX ADMIN — COSYNTROPIN 250 MCG: 0.25 INJECTION, POWDER, LYOPHILIZED, FOR SOLUTION INTRAMUSCULAR; INTRAVENOUS at 09:21

## 2024-11-10 RX ADMIN — LAMOTRIGINE 125 MG: 100 TABLET ORAL at 19:33

## 2024-11-10 RX ADMIN — Medication 10 ML: at 09:19

## 2024-11-10 RX ADMIN — LEVETIRACETAM 750 MG: 500 TABLET, FILM COATED ORAL at 19:32

## 2024-11-10 RX ADMIN — ENOXAPARIN SODIUM 40 MG: 100 INJECTION SUBCUTANEOUS at 09:19

## 2024-11-10 RX ADMIN — LAMOTRIGINE 125 MG: 100 TABLET ORAL at 09:17

## 2024-11-10 RX ADMIN — LEVETIRACETAM 750 MG: 500 TABLET, FILM COATED ORAL at 09:17

## 2024-11-10 RX ADMIN — Medication 10 ML: at 19:33

## 2024-11-10 NOTE — PLAN OF CARE
Problem: Discharge Planning  Goal: Discharge to home or other facility with appropriate resources  Outcome: Progressing     Problem: Safety - Adult  Goal: Free from fall injury  11/10/2024 0732 by Cyndie Fatima RN  Outcome: Progressing  11/10/2024 0118 by Padmini Schrader RN  Outcome: Progressing     Problem: Neurosensory - Adult  Goal: Achieves stable or improved neurological status  11/10/2024 0732 by Cyndie Fatima RN  Outcome: Progressing  11/10/2024 0118 by Padmini Schrader RN  Outcome: Progressing  Goal: Absence of seizures  Outcome: Progressing  Goal: Remains free of injury related to seizures activity  Outcome: Progressing     Problem: Metabolic/Fluid and Electrolytes - Adult  Goal: Electrolytes maintained within normal limits  Outcome: Progressing  Goal: Glucose maintained within prescribed range  Outcome: Progressing     Problem: Skin/Tissue Integrity  Goal: Absence of new skin breakdown  Description: 1.  Monitor for areas of redness and/or skin breakdown  2.  Assess vascular access sites hourly  3.  Every 4-6 hours minimum:  Change oxygen saturation probe site  4.  Every 4-6 hours:  If on nasal continuous positive airway pressure, respiratory therapy assess nares and determine need for appliance change or resting period.  Outcome: Progressing

## 2024-11-11 VITALS
DIASTOLIC BLOOD PRESSURE: 92 MMHG | TEMPERATURE: 97.8 F | RESPIRATION RATE: 16 BRPM | HEART RATE: 80 BPM | BODY MASS INDEX: 28.75 KG/M2 | SYSTOLIC BLOOD PRESSURE: 136 MMHG | HEIGHT: 67 IN | WEIGHT: 183.2 LBS | OXYGEN SATURATION: 96 %

## 2024-11-11 LAB
GLUCOSE BLD-MCNC: 83 MG/DL (ref 70–99)
GLUCOSE BLD-MCNC: 85 MG/DL (ref 70–99)
PERFORMED ON: NORMAL
PERFORMED ON: NORMAL

## 2024-11-11 PROCEDURE — 6370000000 HC RX 637 (ALT 250 FOR IP): Performed by: INTERNAL MEDICINE

## 2024-11-11 RX ORDER — LAMOTRIGINE 25 MG/1
25 TABLET ORAL 2 TIMES DAILY
Qty: 60 TABLET | Refills: 0 | Status: SHIPPED | OUTPATIENT
Start: 2024-11-11

## 2024-11-11 RX ORDER — LEVETIRACETAM 750 MG/1
750 TABLET ORAL 2 TIMES DAILY
Qty: 60 TABLET | Refills: 0 | Status: SHIPPED | OUTPATIENT
Start: 2024-11-11

## 2024-11-11 RX ORDER — LAMOTRIGINE 25 MG/1
125 TABLET ORAL 2 TIMES DAILY
Qty: 300 TABLET | Refills: 0 | Status: SHIPPED | OUTPATIENT
Start: 2024-11-11 | End: 2024-11-11

## 2024-11-11 RX ORDER — LAMOTRIGINE 100 MG/1
100 TABLET ORAL 2 TIMES DAILY
Qty: 60 TABLET | Refills: 0 | Status: SHIPPED | OUTPATIENT
Start: 2024-11-11

## 2024-11-11 RX ADMIN — LEVETIRACETAM 750 MG: 500 TABLET, FILM COATED ORAL at 08:21

## 2024-11-11 RX ADMIN — LAMOTRIGINE 125 MG: 100 TABLET ORAL at 08:21

## 2024-11-11 NOTE — PROGRESS NOTES
[facility]    11/9/2024 10:04 AM        Name: Nahid Hardwick .              Admitted: 11/6/2024  Primary Care Provider: Kenya De Anda APRN - CNP (Tel: [phone])      Chief complaint: 29 y.o. 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 Noted to have BG 68 on presentation.     Subjective:  Resting in bed. Says he wants to go home. Offers no complaints. Denies chest pain, shortness of breath, abdominal pain, nausea.     Reviewed interval ancillary notes    Current Medications  dextrose bolus 10% 125 mL, PRN   Or  dextrose bolus 10% 250 mL, PRN  glucagon injection 1 mg, PRN  dextrose 10 % infusion, Continuous PRN  magnesium sulfate 2000 mg in 50 mL IVPB premix, PRN  levETIRAcetam (KEPPRA) tablet 750 mg, BID  lamoTRIgine (LAMICTAL) tablet 125 mg, BID  sodium chloride flush 0.9 % injection 10 mL, 2 times per day  sodium chloride flush 0.9 % injection 10 mL, PRN  0.9 % sodium chloride infusion, PRN  enoxaparin (LOVENOX) injection 40 mg, Daily  promethazine (PHENERGAN) tablet 12.5 mg, Q6H PRN   Or  ondansetron (ZOFRAN) injection 4 mg, Q6H PRN  melatonin tablet 3 mg, Nightly PRN  acetaminophen (TYLENOL) tablet 650 mg, Q6H PRN   Or  acetaminophen (TYLENOL) suppository 650 mg, Q6H PRN        Objective:  /78   Pulse 70   Temp 97.8 °F (36.6 °C) (Oral)   Resp 16   Ht 1.702 m (5' 7.01\")   Wt 83.5 kg (184 lb 1.6 oz)   SpO2 100%   BMI 28.83 kg/m²     Intake/Output Summary (Last 24 hours) at 11/9/2024 1004  Last data filed at 11/9/2024 0026  Gross per 24 hour   Intake 360 ml   Output --   Net 360 ml      Wt Readings from Last 3 Encounters:   11/08/24 83.5 kg (184 lb 1.6 oz)   10/22/24 84.8 kg (187 lb)   09/23/24 82.6 kg (182 lb)       General appearance:  Appears comfortable  Eyes: Sclera clear. Pupils equal.  ENT: Moist oral 
    Hospitalist Progress Note      Name:  Nahid Hardwick /Age/Sex: 1995  (29 y.o. male)   MRN & CSN:  [id number] & [id number] Encounter Date/Time: 2024 8:53 AM EST   Location:  [facility]3316/3316-01 PCP: Kenya De Anda APRN - MANNY     Attending:Carlos Lawson MD       Hospital Day: 2    Subjective:   Chief Complaint:   Chief Complaint   Patient presents with    Seizures     [facility] from home due to full-body seizure that lasted 2 mins and was witnessed by grandma. Last seizure was 2 months ago. Take lamictal. Pt post-ictal at this time. Pt also hit right side of head, hematoma noted.      Nahid Hardwick is a 29 y.o. 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   Per grandmother seizure lasted 2 minutes and his last seizure was 2 months prior.  Patient was postictal and given 1 g of Keppra and Lamictal .  Admitted for possible repeat EEG and neurology consultation. Keppra level 2.1 and Lamictal level pending.   He was noted to have BG 68 and he was started on D5 LR.     Interval History:  Today, he is sitting up and eating breakfast. He is feeling well, he denies CP or SOB. He reports that he was walking at home and then he remembers waking up here.  He does not remember his seizure.  Reports he was out of his seizure medications for about 6 days prior to the episode.        Independently reviewed interval ancillary notes from neurology.     Assessment and Recommendations   Problem List  Principal Problem:    Seizure (HCC)  Resolved Problems:    * No resolved hospital problems. *     Assessment and Plan:    Epilepsy with breakthrough seizure   -  Mentation is currently at baseline, encephalopathy due to seizure and postictal state has resolved   - On Keppra 750 mg bid and Lamictal 125 mg bid    - Treated with IV ativan and magnesium    - Keppra level 2.1 and Lamictal level pending   - Neurology consultation 
    Hospitalist Progress Note      Name:  Nahid Hardwick /Age/Sex: 1995  (29 y.o. male)   MRN & CSN:  [id number] & [id number] Encounter Date/Time: 2024 8:53 AM EST   Location:  [facility]3316/3316-01 PCP: Kenya De Anda APRN - MANNY     Attending:Carlos Lawson MD       Hospital Day: 3    Subjective:   Chief Complaint:   Chief Complaint   Patient presents with    Seizures     [facility] from home due to full-body seizure that lasted 2 mins and was witnessed by grandma. Last seizure was 2 months ago. Take lamictal. Pt post-ictal at this time. Pt also hit right side of head, hematoma noted.      Nahid Hardwick is a 29 y.o. 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   Per grandmother seizure lasted 2 minutes and his last seizure was 2 months prior.  Patient was postictal and given 1 g of Keppra and Lamictal .  Admitted for possible repeat EEG and neurology consultation. Keppra level 2.1 and Lamictal level pending.   He was noted to have BG 68 and he was started on D5 LR.     Interval History:  Today, he is sitting up in the bed.  He denies CP or SOB.  No swelling.  No abd pain. No seizures overnight.  BG down to 68 overnight.  Since has been stable.      Mother states that he lives with his grandmother, however she is available.  She reports patient is pretty independent, however they do assist him with his medical needs.      Independently reviewed interval ancillary notes from neurology.     Assessment and Recommendations   Problem List  Principal Problem:    Seizure (HCC)  Active Problems:    Partial idiopathic epilepsy with seizures of localized onset, intractable, without status epilepticus (HCC)    Hypokalemia    Hypomagnesemia  Resolved Problems:    * No resolved hospital problems. *     Assessment and Plan:    Epilepsy with breakthrough seizure   -  Mentation is currently at baseline, encephalopathy due to seizure 
    Nahid Hardwick  Neurology Follow-up  [facility]    Date of Service: 11/8/2024    Subjective:   CC: Follow up today regarding: Breakthrough seizure    Events noted. Chart and lab reviewed.  Patient seen this morning he is resting in bed.  He offers no new complaints today.  Lamictal level is pending.  EEG yesterday showed generalized epileptiform discharges which is consistent with patient history of underlying generalized epilepsy.  MRI brain showed abnormal gyri pattern bifrontal lobes and nonspecific nodular lesions throughout the brain.  MRI brain with contrast was ordered.  Today patient denies any headaches, vision changes, extremity weakness, paresthesias.  Other review of systems unremarkable      ROS : A 10-12 system review obtained and updated today and is unremarkable except as mentioned  in my interval history.     Past medical history, social history, medication and family history reviewed.       Objective:  Exam:   Constitutional:   Vitals:    11/07/24 0822 11/07/24 2030 11/08/24 0310 11/08/24 0837   BP: 133/83 129/80  (!) 156/81   Pulse: 73 64  66   Resp: 16 16  16   Temp: 98.5 °F (36.9 °C) 98.2 °F (36.8 °C)  97.7 °F (36.5 °C)   TempSrc: Oral Oral  Oral   SpO2: 98% 97%  98%   Weight:   83.5 kg (184 lb 1.6 oz)    Height:   1.702 m (5' 7.01\")      General appearance:  Normal development and appear in no acute distress.   Mental Status:   Oriented to person, place, problem, and time.    Memory: Good immediate recall.  Intact remote memory  Normal attention span and concentration.  Language: intact naming, repeating and fluency   Good fund of Knowledge.   Cranial Nerves:   II:   Pupils: equal, round, reactive to light  III,IV,VI: Extra Ocular Movements are intact. No nystagmus  V: Facial sensation is intact  VII: Facial strength and movements: intact and symmetric  XII: Tongue movements are normal  Musculoskeletal: 5/5 in all 4 extremities.   Tone: Normal tone.   Reflexes: Symmetric 2+ 
4 Eyes Skin Assessment     The patient is being assess for  Admission    I agree that 2 RN's have performed a thorough Head to Toe Skin Assessment on the patient. ALL assessment sites listed below have been assessed.       Areas assessed by both nurses:   [x]   Head, Face, and Ears   [x]   Shoulders, Back, and Chest  [x]   Arms, Elbows, and Hands   [x]   Coccyx, Sacrum, and IschIum  [x]   Legs, Feet, and Heels  Hematoma on right side of forehead from fall      Does the Patient have Skin Breakdown?  No         Bronson Prevention initiated:  Yes   Wound Care Orders initiated:  No      [facility] nurse consulted for Pressure Injury (Stage 3,4, Unstageable, DTI, NWPT, and Complex wounds), New and Established Ostomies:  No      Nurse 1 eSignature: Electronically signed by Ashli Diaz RN on 11/6/24 at 11:54 PM EST    **SHARE this note so that the co-signing nurse is able to place an eSignature**    Nurse 2 eSignature: Electronically signed by Lacey Guido RN on 11/7/24 at 12:07 AM EST             
CLINICAL PHARMACY NOTE: MEDS TO BEDS    Total # of Prescriptions Filled: 3   The following medications were delivered to the patient:  LAMOTRIGINE 100MG TABS  LAMOTRIGINE 25MG TABS  LEVETIRACETAM 750MG TABS    Additional Documentation: Patient grandmother picked up=signed  [facility] Tech  
Discharge instructions and medications gone over with patient. Patient verbalizes understanding discharge instructions and medications. All questions answered.   
K+ 3.4 reported to NP requesting replacement order via [employer].   
Order for blood sugars and stopping fluids clarified. BS WNL at this time and fluids stopped. Will recheck every 2 hours x 3 after stopped. If normal will change to Q4 blood sugar checks per NP.   
Patient admitted to room 3316 in a stable condition. Patient is alert and oriented. Admission completed. Vital signs WNL, respirations easy and unlabored. Sat 100% on room air. Seizure precaution in place. Bed alarm engaged. Call light within reached. Blood sugar 109. Warm blanket provided. Patient denies pain at the moment. Patient resting in bed.   
Patient's MRI with contrast showed no enhancement   can be discharged once medically stable and follow-up with me 1 month  
Patients head to toe assessment completed. Vital signs WNL. Bed alarm engaged, call light within reach. Scheduled medications given per MAR. Patient denies any pain at the moment. Patient resting in bed. Seizure precaution in place.  
Pt resting in bed, Head to toe assessment done, vitals are stable, seizure precautions in place, no c/o pain during this assessment. Call light within reach.   
Outcomes  AM-PAC Inpatient Mobility Raw Score : 24              Cognition  WFL  Orientation:    A&O x 4    Education  Barriers To Learning: cognition - pt has history of developmental delay, but otherwise high functioning.  Patient Education: patient educated on PT role and benefits, plan of care, discharge recommendations  Learning Assessment:  Pt verbalized and demonstrates understanding    Assessment  Activity Tolerance: Tolerated well  Impairments Requiring Therapeutic Intervention: none - eval with same day discharge  Prognosis: good without need for therapy intervention  Clinical Assessment: Patient presenting at independent level for completion of required mobility tasks for return to home.  Eval with d/c at this time.  No therapy services indicated.    Safety Interventions: patient left in bed, bed alarm in place, call light within reach, gait belt, nurse notified, and seizure mats in use    Plan  Frequency: Eval with same day discharge.  No follow up required.  Current Treatment Recommendations: Not applicable, evaluation completed with same day discharge.    Goals  Patient eval with same day discharge.  No goals set as patient is at baseline mobility status.      Therapy Session Time      Individual Group Co-treatment   Time In     0917   Time Out     0948   Minutes     31     Timed Code Treatment Minutes:  16 Minutes  Total Treatment Minutes:  31       Electronically Signed By: DAMIAN Motta    Therapist observed and directed the above evaluation. Thanks, Kasey Holliday, PT, DPT [id number]         
650 mg, Q6H PRN      Labs and Imaging   Results this Admission:  MRI BRAIN W CONTRAST   Preliminary Result   There is no abnormal enhancement associated with the lesions visualized in   the brain on the prior MRI.         MRI BRAIN WO CONTRAST   Preliminary Result   There is an abnormal gyral pattern and high signal changes in the frontal   lobes bilaterally which may be congenital.  There are several high signal   nodular appearing lesions throughout the brain that are nonspecific.  There   are multiple differential considerations.  If the patient has   neurofibromatosis 1, these could be related to cerebral gliomas.  Infection   cannot be entirely excluded, but is felt to be less likely as there is no   associated restricted diffusion.  Small masses cannot be entirely excluded.   Postcontrast images are suggested to further evaluate.         XR CHEST PORTABLE   Final Result   1. No confluent airspace consolidation.   2. Stable borderline cardiomegaly.         CT CERVICAL SPINE WO CONTRAST   Final Result   1. No acute intracranial abnormality.   2. No acute osseous abnormality of the cervical spine.   3. No acute osseous abnormality of the facial bones.         CT FACIAL BONES WO CONTRAST   Final Result   1. No acute intracranial abnormality.   2. No acute osseous abnormality of the cervical spine.   3. No acute osseous abnormality of the facial bones.         CT HEAD WO CONTRAST   Final Result   1. No acute intracranial abnormality.   2. No acute osseous abnormality of the cervical spine.   3. No acute osseous abnormality of the facial bones.           EEG: Pending    CBC:   Recent Labs     11/08/24 0459 11/09/24 0529   WBC 7.0 6.0   HGB 14.7 16.1    283     BMP:    Recent Labs     11/08/24  0459 11/09/24  0529 11/10/24  0925    138 137   K 3.6 3.6 4.0    102 102   CO2 20* 26 22   BUN 9 9 12   CREATININE 1.1 1.1 1.2   GLUCOSE 96 77 92     Hepatic:   Recent Labs     11/08/24 0459 
Tolerance: Tolerated well.  Impairments Requiring Therapeutic Intervention: none - eval with same day discharge  Prognosis: good; eval & d/c; at baseline  Clinical Assessment: Nahid Hardwick scored a 24/24 on the -[facility] ADL Inpatient form.  At this time, no further OT is recommended upon discharge due to pt is at his baseline level of independent occupational function. .Recommend patient returns to prior setting.  Safety Interventions: patient left in bed, gait belt, nurse notified, and seizure mats in use    Plan  Frequency: Eval with same day discharge.  No follow up required.  Current Treatment Recommendations: not applicable, evaluation completed with same day discharge.    Goals  Patient Goals: N/A; eval & d/c   Short Term Goals:  Time Frame: N/a  Patient eval with same day discharge.  No goals set as patient is at baseline status.      Therapy Session Time     Individual Group Co-treatment   Time In    0917   Time Out    0948   Minutes    31        Timed Code Treatment Minutes:   16 min  Total Treatment Minutes:  31 min       Electronically Signed By: KIEL ENNIS, OT  ANITRA Rico Ed., OTR/L, [id number]

## 2024-11-11 NOTE — PLAN OF CARE
Problem: Discharge Planning  Goal: Discharge to home or other facility with appropriate resources  Outcome: Progressing     Problem: Safety - Adult  Goal: Free from fall injury  11/11/2024 1047 by Geno Vega, RN  Outcome: Progressing     Problem: Neurosensory - Adult  Goal: Achieves stable or improved neurological status  11/11/2024 1047 by Geno Vega, RN  Outcome: Progressing     Problem: Neurosensory - Adult  Goal: Absence of seizures  Outcome: Progressing     Problem: Neurosensory - Adult  Goal: Remains free of injury related to seizures activity  Outcome: Progressing     Problem: Metabolic/Fluid and Electrolytes - Adult  Goal: Electrolytes maintained within normal limits  Outcome: Progressing     Problem: Metabolic/Fluid and Electrolytes - Adult  Goal: Glucose maintained within prescribed range  Outcome: Progressing     Problem: Skin/Tissue Integrity  Goal: Absence of new skin breakdown  Description: 1.  Monitor for areas of redness and/or skin breakdown  2.  Assess vascular access sites hourly  3.  Every 4-6 hours minimum:  Change oxygen saturation probe site  4.  Every 4-6 hours:  If on nasal continuous positive airway pressure, respiratory therapy assess nares and determine need for appliance change or resting period.  Outcome: Progressing

## 2024-11-11 NOTE — PLAN OF CARE
Problem: Discharge Planning  Goal: Discharge to home or other facility with appropriate resources  11/11/2024 1118 by Geno Vega RN  Outcome: Completed     Problem: Safety - Adult  Goal: Free from fall injury  11/11/2024 1118 by Geno Vega RN  Outcome: Completed     Problem: Neurosensory - Adult  Goal: Achieves stable or improved neurological status  11/11/2024 1118 by Geno Vega RN  Outcome: Completed     Problem: Neurosensory - Adult  Goal: Absence of seizures  11/11/2024 1118 by Geno Vega RN  Outcome: Completed     Problem: Neurosensory - Adult  Goal: Remains free of injury related to seizures activity  11/11/2024 1118 by Geno Vega RN  Outcome: Completed     Problem: Metabolic/Fluid and Electrolytes - Adult  Goal: Electrolytes maintained within normal limits  11/11/2024 1118 by Geno Vega RN  Outcome: Completed     Problem: Metabolic/Fluid and Electrolytes - Adult  Goal: Glucose maintained within prescribed range  11/11/2024 1118 by Geno Vega RN  Outcome: Completed     Problem: Skin/Tissue Integrity  Goal: Absence of new skin breakdown  Description: 1.  Monitor for areas of redness and/or skin breakdown  2.  Assess vascular access sites hourly  3.  Every 4-6 hours minimum:  Change oxygen saturation probe site  4.  Every 4-6 hours:  If on nasal continuous positive airway pressure, respiratory therapy assess nares and determine need for appliance change or resting period.  11/11/2024 1118 by Geno Vega, RN  Outcome: Completed

## 2024-11-11 NOTE — DISCHARGE SUMMARY
[facility]    Patient Demographics    Patient. Nahid Hardwick  Date of Birth. 1995  MRN. [id number]     Primary care provider. Kenya De Anda APRN - CNP  (Tel: [phone])    Admit date: 11/6/2024    Discharge date (blank if same as Note Date):   Note Date: 11/11/2024     Reason for Hospitalization.   Chief Complaint   Patient presents with    Seizures     [facility] from home due to full-body seizure that lasted 2 mins and was witnessed by grandma. Last seizure was 2 months ago. Take lamictal. Pt post-ictal at this time. Pt also hit right side of head, hematoma noted.        Significant Findings.   Principal Problem:    Seizure (HCC)  Active Problems:    Partial idiopathic epilepsy with seizures of localized onset, intractable, without status epilepticus (HCC)    Hypokalemia    Hypomagnesemia  Resolved Problems:    * No resolved hospital problems. *     Problem-based Hospital Course.  Nahid Hardwick is a 29 y.o. 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   Per grandmother seizure lasted 2 minutes and his last seizure was 2 months prior.  Patient was postictal and given 1 g of Keppra and Lamictal .  Admitted for possible repeat EEG and neurology consultation. Keppra level 2.1 and Lamictal level pending.   He was noted to have BG 68 and he was started on D5 LR. MRI brain is abnormal - abnormal gyral pattern and high signal changes in the frontal lobes bilaterally which may be congenital.  There are several high signal nodular appearing lesions throughout the brain that are nonspecific. MRI brain W WO contrast reviewed by neurology and showed no enhancement.  Neurology consulted, reviewed imaging, Keppra increased to 750 mg bid, signed off and will follow up in 1 month in the office.     Assessment and Plan:  Epilepsy with breakthrough seizure              -  Mentation is

## 2024-11-12 ENCOUNTER — CARE COORDINATION (OUTPATIENT)
Dept: CASE MANAGEMENT | Age: 29
End: 2024-11-12

## 2024-11-12 ENCOUNTER — OFFICE VISIT (OUTPATIENT)
Dept: FAMILY MEDICINE CLINIC | Age: 29
End: 2024-11-12
Payer: COMMERCIAL

## 2024-11-12 VITALS
DIASTOLIC BLOOD PRESSURE: 88 MMHG | BODY MASS INDEX: 29.1 KG/M2 | SYSTOLIC BLOOD PRESSURE: 112 MMHG | WEIGHT: 185.4 LBS | HEIGHT: 67 IN | OXYGEN SATURATION: 99 % | HEART RATE: 80 BPM

## 2024-11-12 DIAGNOSIS — G40.919 BREAKTHROUGH SEIZURE (HCC): ICD-10-CM

## 2024-11-12 DIAGNOSIS — Z09 HOSPITAL DISCHARGE FOLLOW-UP: Primary | ICD-10-CM

## 2024-11-12 PROCEDURE — 99214 OFFICE O/P EST MOD 30 MIN: CPT

## 2024-11-12 PROCEDURE — 1111F DSCHRG MED/CURRENT MED MERGE: CPT

## 2024-11-12 ASSESSMENT — ENCOUNTER SYMPTOMS
TROUBLE SWALLOWING: 0
WHEEZING: 0
RHINORRHEA: 0
ABDOMINAL DISTENTION: 0
NAUSEA: 0
COLOR CHANGE: 0
CONSTIPATION: 0
SORE THROAT: 0
BACK PAIN: 0
SHORTNESS OF BREATH: 0
CHEST TIGHTNESS: 0
COUGH: 0
DIARRHEA: 0
VOMITING: 0
ABDOMINAL PAIN: 0
PHOTOPHOBIA: 0

## 2024-11-12 NOTE — CARE COORDINATION
and resources available to patient including: PCP  Urgent care clinics  When to call 911. The patient agrees to contact the primary care provider and/or specialist office for questions related to their healthcare.      Advance Care Planning:   Does patient have an Advance Directive: not on file; education provided - . .    Medication Reconciliation:  Medication reconciliation was performed with patient,1111F entered: N/A. Was completed at PCP visit today.    Remote Patient Monitoring:  Offered patient enrollment in the Remote Patient Monitoring (RPM) program for in-home monitoring: Patient is not eligible for RPM program because: patient does not have qualifying diagnosis.    Assessments:  Care Transitions 24 Hour Call    Do you have all of your prescriptions and are they filled?: Yes  Do you have support at home?: Parent(s)  Are you an active caregiver in your home?: No  Care Transitions Interventions          Follow Up Appointment:   Discussed follow up appointments. Patient has hospital follow up appointment scheduled within 7 days of discharge.   Future Appointments         Provider Specialty Dept Phone    12/12/2024 12:15 PM Ramu Escamilla MD Neurology [phone]    3/25/2025 11:00 AM Ramu Escamilla MD Neurology [phone]            Care Transition Nurse provided contact information.  Plan for follow-up call in 6-10 days based on severity of symptoms and risk factors.  Plan for next call: symptom management-.  self management-.  follow-up appointment-.      TERRENCE HARRIS RN

## 2024-11-12 NOTE — PATIENT INSTRUCTIONS
To avoid our call center, call our office number [phone], and listen to the options.  After listening to all English options, select the behavioral health option (option 1).  This should get you to our front office.    You may receive a survey regarding the care you received during your visit.  Your input is valuable to us.  We encourage you to complete and return your survey. We hope you will choose us in the future for your healthcare needs.

## 2024-11-12 NOTE — PROGRESS NOTES
Post-Discharge Transitional Care Follow Up      Nahid Hardwick   YOB: 1995    Date of Office Visit:  11/12/2024  Date of Hospital Admission: 11/6/24  Date of Hospital Discharge: 11/11/24  Readmission Risk Score (high >=14%. Medium >=10%):Readmission Risk Score: 3.9      Care management risk score Rising risk (score 2-5) and Complex Care (Scores >=6): No Risk Score On File     Non face to face  following discharge, date last encounter closed (first attempt may have been earlier): *No documented post hospital discharge outreach found in the last 14 days     Call initiated 2 business days of discharge: *No response recorded in the last 14 days     Hospital discharge follow-up  -Inpatient stay reviewed including provider notes, imaging, procedures, blood work, medications, vitals, and discharge note.  -Breakthrough seizure, neurology questions compliance to medication  -Encourage compliance to twice daily Keppra Lamictal  -Okay to return to work, no driving  -Continue plan to follow-up with neurology  -     [place] DISCHARGE MEDS RECONCILED W/ CURRENT OUTPATIENT MED LIST  Breakthrough seizure (HCC)  -As above    Medical Decision Making: moderate complexity  Return if symptoms worsen or fail to improve.           Subjective:   HPI  -Breakthrough seizure    Inpatient course: Discharge summary reviewed- see chart.    Interval history/Current status: breakthrough seizure at home, witnessed by grandma  -was fine in AM, then woke up in the hospital  -needs a letter to go back to work, Jodie's boss said he needs one  -since out of the hospital, doing well  -was agitated and aggressive w/ seizure post ictically   -hit his R head w/ seizure  -arrived to ER post ictal  -lamictal and keppra hx, keppra increased to 750 mg BID, lamictal 125 mg BID  -keppra bolus in ER  -head/neck/facial CT WNL  -elevated CK, hypokalemia, hypomagnesemia  -MRI w/o contrast shows abnormal gyri pattern bifrontal lobes and nonspecific nodular

## 2024-11-19 ENCOUNTER — CARE COORDINATION (OUTPATIENT)
Dept: CASE MANAGEMENT | Age: 29
End: 2024-11-19

## 2024-11-19 NOTE — CARE COORDINATION
Care Transitions Note    Follow Up Call     Patient: Nahid Hardwick                                Patient : 1995   MRN: [id number]                             Reason for Admission: seizure, epilepsy  Discharge Date: 24     RURS: Readmission Risk Score: 3.9    Patient Current Location:  Home: [address] [place] [address]    Care Transition Nurse contacted the patient by telephone. Verified name and  as identifiers.    Additional needs identified to be addressed with provider   No needs identified                 Method of communication with provider: none.    Care Summary Note: Patient reports that he is doing well.  He denies any seizure activity and reports that he is taking all medications as directed.  He denies any questions or concerns at this time and will follow up with neurologist 24.  CTN will continue with outreach follow up calls.    Plan of care updates since last contact:  Education: .  Review of patient management of conditions/medications: .       Advance Care Planning:   Does patient have an Advance Directive: reviewed during previous call, see note. .    Medication Review:  No changes since last call.     Remote Patient Monitoring:  Offered patient enrollment in the Remote Patient Monitoring (RPM) program for in-home monitoring: Patient is not eligible for RPM program because: patient does not have qualifying diagnosis.    Assessments:  Care Transitions Subsequent and Final Call    Subsequent and Final Calls  Do you have any ongoing symptoms?: No  Have your medications changed?: No  Do you have any questions related to your medications?: No  Do you currently have any active services?: No  Do you have any needs or concerns that I can assist you with?: No  Identified Barriers: None  Care Transitions Interventions  Other Interventions:              Follow Up Appointment:   Reviewed upcoming appointment(s).  Future Appointments         Provider Specialty Dept Phone

## 2024-11-25 DIAGNOSIS — G40.919 BREAKTHROUGH SEIZURE (HCC): Primary | ICD-10-CM

## 2024-11-26 ENCOUNTER — CARE COORDINATION (OUTPATIENT)
Dept: CASE MANAGEMENT | Age: 29
End: 2024-11-26

## 2024-11-26 NOTE — CARE COORDINATION
Care Transitions Note    Follow Up Call     Reason for Admission: seizure, epilepsy     Patient Current Location:  Home: [address] [place], [place] [id number]    [facility] Care Coordinator contacted the patient by telephone. Verified name and  as identifiers.    Additional needs identified to be addressed with provider   No needs identified                 Method of communication with provider: none.    Care Summary Note: Spoke with Nahid Hardwick who reported that he is doing good. Patient reported no more episodes of seizure since being in the hospital. Patient did report that last night he seen a bright light flash in his eye but nothing after. Patient denied cp, sob, cough, dizziness, headache, n/v, diarrhea, abdominal pains, fever, or chills. Patient report that appetite and fluid intake is good and denied any problems with bowel or bladder. Patient reported that he is taking all medications as ordered. Patient denied any other needs at this time. Patient instructed to continue to monitor s/s, reporting any that may present to MD immediately for early intervention.  Patient is agreeable to f/u calls.    Plan of care updates since last contact:          Advance Care Planning   The patient has the following advanced directives on file:  Advance Directives       Power of  Living Will ACP-Advance Directive ACP-Power of     Not on File Not on File Not on File Not on File            The patient has appointed the following active healthcare agents:    Primary Decision Maker: Crys Hardwick - [facility] - [phone]      Medication Review:  No changes since last call.     Remote Patient Monitoring:  Offered patient enrollment in the Remote Patient Monitoring (RPM) program for in-home monitoring: Patient is not eligible for RPM program because: patient does not have qualifying diagnosis.    Assessments:  Care Transitions Subsequent and Final Call    Subsequent and Final Calls  Do you have any ongoing

## 2024-11-27 RX ORDER — LAMOTRIGINE 25 MG/1
25 TABLET ORAL 2 TIMES DAILY
Qty: 60 TABLET | Refills: 0 | Status: SHIPPED | OUTPATIENT
Start: 2024-11-27

## 2024-11-27 RX ORDER — LEVETIRACETAM 750 MG/1
750 TABLET ORAL 2 TIMES DAILY
Qty: 60 TABLET | Refills: 0 | Status: SHIPPED | OUTPATIENT
Start: 2024-11-27

## 2024-11-27 RX ORDER — LAMOTRIGINE 100 MG/1
100 TABLET ORAL 2 TIMES DAILY
Qty: 60 TABLET | Refills: 0 | Status: SHIPPED | OUTPATIENT
Start: 2024-11-27

## 2024-12-03 ENCOUNTER — CARE COORDINATION (OUTPATIENT)
Dept: CASE MANAGEMENT | Age: 29
End: 2024-12-03

## 2024-12-03 NOTE — CARE COORDINATION
Care Transitions Note    Follow Up Call     Attempted to reach patient for transitions of care follow up.  Unable to reach patient.      Outreach Attempts:   HIPAA compliant voicemail left for patient.     Care Summary Note: Follow up outreach call attempt, no answer.  CTN left VM with contact information and request for return call.  CTN will continue with outreach call attempts.      Follow Up Appointment:   Future Appointments         Provider Specialty Dept Phone    12/12/2024 12:15 PM Ramu Escamilla MD Neurology [phone]    3/25/2025 11:00 AM Ramu Escamilla MD Neurology [phone]            Plan for follow-up call in 6-10 days based on severity of symptoms and risk factors. Plan for next call: symptom management-.  self management-.  follow-up appointment-.    TERRENCE HARRIS RN

## 2024-12-10 ENCOUNTER — CARE COORDINATION (OUTPATIENT)
Dept: CASE MANAGEMENT | Age: 29
End: 2024-12-10

## 2024-12-10 NOTE — CARE COORDINATION
Care Transitions Note    Final Call     Patient Current Location:  Home: [address] [place] [id number]    Care Transition Nurse contacted the patient by telephone. Verified name and  as identifiers.    Patient graduated from the Care Transitions program on 12/10/24.  Patient/family verbalizes confidence in the ability to self-manage at this time..      Advance Care Planning:   Does patient have an Advance Directive: reviewed during previous call, see note. .    Handoff:   Patient was not referred to the ACM team due to no additional needs identified.       Care Summary Note: Final call: Patient reports that he is doing well.  He did have some episodes where he saw light flash in front of his eyes, but denies any seizure activity.  He is scheduled to see neurologist this Thursday, CTN reminded him of this appointment.  He denies any questions or concerns at this time.  CTN will continue with outreach follow up calls.    Assessments:  Care Transitions Subsequent and Final Call    Subsequent and Final Calls  Do you have any ongoing symptoms?: No  Have your medications changed?: No  Do you have any questions related to your medications?: No  Do you currently have any active services?: No  Do you have any needs or concerns that I can assist you with?: No  Identified Barriers: None  Care Transitions Interventions  Other Interventions:              Upcoming Appointments:    Future Appointments         Provider Specialty Dept Phone    2024 12:15 PM Ramu Escamilla MD Neurology [phone]    3/25/2025 11:00 AM Ramu Escamilla MD Neurology [phone]            Patient has agreed to contact primary care provider and/or specialist for any further questions, concerns, or needs.    TERRENCE HARRIS RN

## 2024-12-12 ENCOUNTER — OFFICE VISIT (OUTPATIENT)
Dept: NEUROLOGY | Age: 29
End: 2024-12-12
Payer: COMMERCIAL

## 2024-12-12 VITALS
HEART RATE: 68 BPM | WEIGHT: 185 LBS | SYSTOLIC BLOOD PRESSURE: 124 MMHG | DIASTOLIC BLOOD PRESSURE: 80 MMHG | BODY MASS INDEX: 29.03 KG/M2 | HEIGHT: 67 IN

## 2024-12-12 DIAGNOSIS — G40.919 BREAKTHROUGH SEIZURE (HCC): ICD-10-CM

## 2024-12-12 DIAGNOSIS — R62.50 DEVELOPMENTAL DELAY: ICD-10-CM

## 2024-12-12 DIAGNOSIS — G40.209 COMPLEX PARTIAL EPILEPSY WITH GENERALIZATION AND WITH NONINTRACTABLE EPILEPSY (HCC): Primary | ICD-10-CM

## 2024-12-12 PROCEDURE — 99214 OFFICE O/P EST MOD 30 MIN: CPT | Performed by: PSYCHIATRY & NEUROLOGY

## 2024-12-12 PROCEDURE — G8484 FLU IMMUNIZE NO ADMIN: HCPCS | Performed by: PSYCHIATRY & NEUROLOGY

## 2024-12-12 PROCEDURE — 1036F TOBACCO NON-USER: CPT | Performed by: PSYCHIATRY & NEUROLOGY

## 2024-12-12 PROCEDURE — G8417 CALC BMI ABV UP PARAM F/U: HCPCS | Performed by: PSYCHIATRY & NEUROLOGY

## 2024-12-12 PROCEDURE — G8427 DOCREV CUR MEDS BY ELIG CLIN: HCPCS | Performed by: PSYCHIATRY & NEUROLOGY

## 2024-12-12 RX ORDER — LEVETIRACETAM 750 MG/1
750 TABLET ORAL 2 TIMES DAILY
Qty: 60 TABLET | Refills: 5 | Status: SHIPPED | OUTPATIENT
Start: 2024-12-12

## 2024-12-12 RX ORDER — LAMOTRIGINE 25 MG/1
25 TABLET ORAL 2 TIMES DAILY
Qty: 60 TABLET | Refills: 5 | Status: SHIPPED | OUTPATIENT
Start: 2024-12-12

## 2024-12-12 RX ORDER — LAMOTRIGINE 100 MG/1
100 TABLET ORAL 2 TIMES DAILY
Qty: 60 TABLET | Refills: 5 | Status: SHIPPED | OUTPATIENT
Start: 2024-12-12

## 2024-12-12 NOTE — PROGRESS NOTES
The patient came today for follow up regarding: epilepsy and recent breakthrough seizure    The patient had a breakthrough seizure last month.  Description generalized motor seizure for few minutes.  He was seen in the ED and his Keppra was increased to 750 mg x 2.  He came today for follow-up    No seizures since last month.  No side effect from Keppra.  No suicidal ideation or thoughts.  He is on Lamictal 250 Mg twice daily.  He denies any new symptoms today.  No headache, double vision or blurred vision.  Other review of system was unremarkable.            Exam:   Constitutional:   Vitals:    12/12/24 1214   BP: 124/80   Pulse: 68   Weight: 83.9 kg (185 lb)   Height: 1.702 m (5' 7.01\")       General appearance:  Normal development and appear in no acute distress.   Mental Status: no changes today compared to last visit  Awake and alert  Poor fund of knowledge and immediate recall  Fluent speech  Poor vocabulary  Cranial Nerves:   II: Pupils: equal, round, reactive to light  III,IV,VI: Extra Ocular Movements are intact. No nystagmus  V: Facial sensation is intact   VII: Facial strength and movements: intact and symmetric  XII: Tongue movements are normal  Musculoskeletal: 5/5 in all 4 extremities.   Tone: Normal tone.   Coordination: no tremors or drift  DTR: symmetric 2 in UL and LL  Sensation: normal   Gait/Posture: steady gait     ROS : A 10-14 system review of constitutional, cardiovascular, respiratory, GI, eyes, , ENT, musculoskeletal, endocrine, hematological, skin, SHEENT, genitourinary, psychiatric and neurologic systems was obtained and updated today which is unremarkable except as mentioned in my HPI      Medical decision making:    A. Problems (any 1)    High:    [] Acute/Chronic Illness/injury posing threat to life or bodily function:    [] Severe exacerbation of chronic illness:      Moderate:    [x]     1 or more chronic illness with exacerbation, progression or side effect of treatment or  []

## 2025-03-25 ENCOUNTER — TELEPHONE (OUTPATIENT)
Dept: FAMILY MEDICINE CLINIC | Age: 30
End: 2025-03-25

## 2025-03-25 DIAGNOSIS — R47.89 SLOW RATE OF SPEECH: Primary | ICD-10-CM

## 2025-03-25 DIAGNOSIS — F80.81 STUTTERING: ICD-10-CM

## 2025-03-25 NOTE — TELEPHONE ENCOUNTER
Cristina Demarco from [facility] was calling with the patient and they were wanting to get a referral for speech therapy. She said it has helped him in the past.     They were thinking [facility] on [address]     Cristina was also asking if there was anywhere else that works on Mental Health and Therapy in [place]. Transportation is a major issue but the patient is willing to walk anywhere in [place]. She says with him working and struggling with speech he is getting frustrated.     We can call her or the Patient

## 2025-03-25 NOTE — TELEPHONE ENCOUNTER
Referred To: [facility]  [address] [address]         Loc/POS:                Phone: [phone]   Phone:     Fax:        What is the mental health concern- he may be able to see Ami  He - she will need to call his insurance for the other concerns if this is not sifficient-

## 2025-03-25 NOTE — TELEPHONE ENCOUNTER
ANETTE CALLED BACK SUSANA IS HAVING OUTBURST OF ANGER AND IT MAKES KEEPING A JOB HARD, SHE WANTS SOMONE WHO COULD POSS HELP WITH WAYS TO MANAGE THIS NOT SURE MEDICATION IS WHAT HE IS NEEDING BUT MORE STRATEGIES. SHE IS FAMILIAR WITH [employer] AND WILL LOOK INTO THIS NOT SURE IF SUSANA WILL BE ABLE TO DO THE ONLINE PART OF IT. IS THERE SOMEONE OR SOMETHING ELSE YOU MAY RECOMMEND OR DO YOU THINK GINGER WOULD BE ABLE TO HELP?

## 2025-05-05 DIAGNOSIS — L21.9 SEBORRHEIC DERMATITIS: ICD-10-CM

## 2025-05-06 DIAGNOSIS — G40.209 COMPLEX PARTIAL EPILEPSY WITH GENERALIZATION AND WITH NONINTRACTABLE EPILEPSY (HCC): ICD-10-CM

## 2025-05-06 RX ORDER — KETOCONAZOLE 20 MG/ML
SHAMPOO, SUSPENSION TOPICAL
Qty: 120 ML | Refills: 0 | Status: SHIPPED | OUTPATIENT
Start: 2025-05-06 | End: 2025-06-19

## 2025-05-06 RX ORDER — LAMOTRIGINE 25 MG/1
25 TABLET ORAL 2 TIMES DAILY
Qty: 180 TABLET | Refills: 1 | OUTPATIENT
Start: 2025-05-06

## 2025-05-08 ENCOUNTER — OFFICE VISIT (OUTPATIENT)
Dept: PSYCHIATRY | Age: 30
End: 2025-05-08
Payer: COMMERCIAL

## 2025-05-08 VITALS
HEIGHT: 67 IN | SYSTOLIC BLOOD PRESSURE: 116 MMHG | WEIGHT: 191 LBS | DIASTOLIC BLOOD PRESSURE: 70 MMHG | BODY MASS INDEX: 29.98 KG/M2

## 2025-05-08 DIAGNOSIS — R62.50 DEVELOPMENTAL DELAY: Primary | Chronic | ICD-10-CM

## 2025-05-08 PROCEDURE — G8417 CALC BMI ABV UP PARAM F/U: HCPCS | Performed by: NURSE PRACTITIONER

## 2025-05-08 PROCEDURE — 99203 OFFICE O/P NEW LOW 30 MIN: CPT | Performed by: NURSE PRACTITIONER

## 2025-05-08 PROCEDURE — G8427 DOCREV CUR MEDS BY ELIG CLIN: HCPCS | Performed by: NURSE PRACTITIONER

## 2025-05-08 PROCEDURE — 1036F TOBACCO NON-USER: CPT | Performed by: NURSE PRACTITIONER

## 2025-05-08 ASSESSMENT — ANXIETY QUESTIONNAIRES
4. TROUBLE RELAXING: NOT AT ALL
7. FEELING AFRAID AS IF SOMETHING AWFUL MIGHT HAPPEN: NOT AT ALL
1. FEELING NERVOUS, ANXIOUS, OR ON EDGE: NOT AT ALL
IF YOU CHECKED OFF ANY PROBLEMS ON THIS QUESTIONNAIRE, HOW DIFFICULT HAVE THESE PROBLEMS MADE IT FOR YOU TO DO YOUR WORK, TAKE CARE OF THINGS AT HOME, OR GET ALONG WITH OTHER PEOPLE: NOT DIFFICULT AT ALL
3. WORRYING TOO MUCH ABOUT DIFFERENT THINGS: NOT AT ALL
2. NOT BEING ABLE TO STOP OR CONTROL WORRYING: NOT AT ALL
6. BECOMING EASILY ANNOYED OR IRRITABLE: NOT AT ALL
GAD7 TOTAL SCORE: 0
5. BEING SO RESTLESS THAT IT IS HARD TO SIT STILL: NOT AT ALL

## 2025-05-08 ASSESSMENT — PATIENT HEALTH QUESTIONNAIRE - PHQ9
7. TROUBLE CONCENTRATING ON THINGS, SUCH AS READING THE NEWSPAPER OR WATCHING TELEVISION: NOT AT ALL
SUM OF ALL RESPONSES TO PHQ QUESTIONS 1-9: 0
SUM OF ALL RESPONSES TO PHQ QUESTIONS 1-9: 0
9. THOUGHTS THAT YOU WOULD BE BETTER OFF DEAD, OR OF HURTING YOURSELF: NOT AT ALL
2. FEELING DOWN, DEPRESSED OR HOPELESS: NOT AT ALL
SUM OF ALL RESPONSES TO PHQ QUESTIONS 1-9: 0
6. FEELING BAD ABOUT YOURSELF - OR THAT YOU ARE A FAILURE OR HAVE LET YOURSELF OR YOUR FAMILY DOWN: NOT AT ALL
3. TROUBLE FALLING OR STAYING ASLEEP: NOT AT ALL
1. LITTLE INTEREST OR PLEASURE IN DOING THINGS: NOT AT ALL
8. MOVING OR SPEAKING SO SLOWLY THAT OTHER PEOPLE COULD HAVE NOTICED. OR THE OPPOSITE, BEING SO FIGETY OR RESTLESS THAT YOU HAVE BEEN MOVING AROUND A LOT MORE THAN USUAL: NOT AT ALL
4. FEELING TIRED OR HAVING LITTLE ENERGY: NOT AT ALL
10. IF YOU CHECKED OFF ANY PROBLEMS, HOW DIFFICULT HAVE THESE PROBLEMS MADE IT FOR YOU TO DO YOUR WORK, TAKE CARE OF THINGS AT HOME, OR GET ALONG WITH OTHER PEOPLE: NOT DIFFICULT AT ALL
5. POOR APPETITE OR OVEREATING: NOT AT ALL
SUM OF ALL RESPONSES TO PHQ QUESTIONS 1-9: 0

## 2025-05-08 NOTE — PROGRESS NOTES
PSYCHIATRY INITIAL EVALUATION/DIAGNOSTIC ASSESSMENT    Nahid Hardwick  1995  05/08/25    CC:   Chief Complaint   Patient presents with    New Patient     Referred by Kenya De Anda APRN - CNP.      Diagnosis Orders   1. Developmental delay              Assessment & Plan:     Psychiatric Assessment  Patient presentation today indicative of Developmental Delay, Anger. Patient has been displaying symptoms of anger that have concerned those around him when it comes to work. However, patient declining medication today. Has no support here with him today.    2. Pharmacology    - None  - Taking Lamictal for seizures    3.   Psychotherapy  - Practice complementary health approaches such as: self-management strategies, relaxation techniques, yoga, and physical exercise as tolerated.    4.   Substance Abuse  - None    5.   Medical   - Neurology   - PCP    6.   RTC:  PRN  or earlier if your symptoms fail to improve or go to nearest ER if having active SI/HI.   Evaluated medications and assessed for side effects and effectiveness. Assessed patient's educational needs including reviewing plan of care, medications,diagnosis, treatment options, and prognosis. I reviewed the plan of care with the patient and the patient consented to the treatment interventions. Patient acknowledged, verbalized understanding, and agreed with plan of care.  _________________________________________________________________________________________________________________________________  HPI: Patient is a 29 y.o. male with h/o Developmental Delay who p/t office to establish care with this provider.    Context: Patient unsure why he is here today. Reports that he does have some anger issues. However, reports that he is not interested in medication to help with this currently nor therapy.     Associated Psych Symptoms: Reports some anger    Location: Mind  Duration: Years  Timing: Chronic  Severity: Mod  Modifying Factors: DD, Uncontrolled

## 2025-06-18 DIAGNOSIS — G40.209 COMPLEX PARTIAL EPILEPSY WITH GENERALIZATION AND WITH NONINTRACTABLE EPILEPSY (HCC): ICD-10-CM

## 2025-06-18 DIAGNOSIS — L21.9 SEBORRHEIC DERMATITIS: ICD-10-CM

## 2025-06-18 RX ORDER — LEVETIRACETAM 500 MG/1
500 TABLET ORAL 2 TIMES DAILY
Qty: 180 TABLET | Refills: 0 | Status: SHIPPED | OUTPATIENT
Start: 2025-06-18

## 2025-06-18 RX ORDER — LEVETIRACETAM 750 MG/1
750 TABLET ORAL 2 TIMES DAILY
Qty: 180 TABLET | Refills: 0 | Status: SHIPPED | OUTPATIENT
Start: 2025-06-18

## 2025-06-18 RX ORDER — LAMOTRIGINE 100 MG/1
100 TABLET ORAL 2 TIMES DAILY
Qty: 180 TABLET | Refills: 0 | Status: SHIPPED | OUTPATIENT
Start: 2025-06-18

## 2025-06-18 RX ORDER — LAMOTRIGINE 25 MG/1
25 TABLET ORAL 2 TIMES DAILY
Qty: 180 TABLET | Refills: 0 | Status: SHIPPED | OUTPATIENT
Start: 2025-06-18

## 2025-06-18 NOTE — TELEPHONE ENCOUNTER
LamoTrIgine 100MG and 25MG- LF on 12/12/2024. 1 month supply, 5 refills.   LevETIRAcetam 750mg- LF on 12/12/2024 1 month supply, 5 refills.     Spoke with pt, they do need refills.

## 2025-06-19 RX ORDER — KETOCONAZOLE 20 MG/ML
SHAMPOO, SUSPENSION TOPICAL
Qty: 120 ML | Refills: 0 | Status: SHIPPED | OUTPATIENT
Start: 2025-06-19

## 2025-06-24 ENCOUNTER — OFFICE VISIT (OUTPATIENT)
Dept: NEUROLOGY | Age: 30
End: 2025-06-24
Payer: COMMERCIAL

## 2025-06-24 VITALS
BODY MASS INDEX: 30.45 KG/M2 | SYSTOLIC BLOOD PRESSURE: 124 MMHG | WEIGHT: 194 LBS | DIASTOLIC BLOOD PRESSURE: 78 MMHG | HEART RATE: 73 BPM | HEIGHT: 67 IN

## 2025-06-24 DIAGNOSIS — G40.209 COMPLEX PARTIAL EPILEPSY WITH GENERALIZATION AND WITH NONINTRACTABLE EPILEPSY (HCC): ICD-10-CM

## 2025-06-24 DIAGNOSIS — G40.209 COMPLEX PARTIAL EPILEPSY WITH GENERALIZATION AND WITH NONINTRACTABLE EPILEPSY (HCC): Primary | ICD-10-CM

## 2025-06-24 PROCEDURE — 1036F TOBACCO NON-USER: CPT | Performed by: PSYCHIATRY & NEUROLOGY

## 2025-06-24 PROCEDURE — G8427 DOCREV CUR MEDS BY ELIG CLIN: HCPCS | Performed by: PSYCHIATRY & NEUROLOGY

## 2025-06-24 PROCEDURE — G8417 CALC BMI ABV UP PARAM F/U: HCPCS | Performed by: PSYCHIATRY & NEUROLOGY

## 2025-06-24 PROCEDURE — 99214 OFFICE O/P EST MOD 30 MIN: CPT | Performed by: PSYCHIATRY & NEUROLOGY

## 2025-06-24 RX ORDER — LAMOTRIGINE 100 MG/1
100 TABLET ORAL 2 TIMES DAILY
Qty: 180 TABLET | Refills: 1 | Status: SHIPPED | OUTPATIENT
Start: 2025-06-24

## 2025-06-24 RX ORDER — LEVETIRACETAM 750 MG/1
750 TABLET ORAL 2 TIMES DAILY
Qty: 180 TABLET | Refills: 1 | Status: SHIPPED | OUTPATIENT
Start: 2025-06-24

## 2025-06-24 RX ORDER — LAMOTRIGINE 25 MG/1
25 TABLET ORAL 2 TIMES DAILY
Qty: 180 TABLET | Refills: 1 | Status: SHIPPED | OUTPATIENT
Start: 2025-06-24

## 2025-06-24 RX ORDER — LEVETIRACETAM 500 MG/1
500 TABLET ORAL 2 TIMES DAILY
Qty: 180 TABLET | Refills: 1 | Status: SHIPPED | OUTPATIENT
Start: 2025-06-24 | End: 2025-06-24 | Stop reason: ALTCHOICE

## 2025-06-24 NOTE — PROGRESS NOTES
The patient came today for follow up regarding: epilepsy       The patient denies any sedation since last visit.  Last seizure was in November of last year.  No recent falling or injury or passing out.  He is on Keppra 750 Mg x 2 and Lamictal 250 Mg daily.  No side effects.  Requesting refill.  No suicidal ideation or thoughts.  Other review of system was unremarkable.        Exam:   Constitutional:   Vitals:    06/24/25 1356   BP: 124/78   BP Site: Left Upper Arm   Patient Position: Sitting   BP Cuff Size: Medium Adult   Pulse: 73   Weight: 88 kg (194 lb)   Height: 1.702 m (5' 7.01\")       General appearance:  Normal development and appear in no acute distress.   Mental Status: no changes today compared to last visit  Awake and alert  Poor fund of knowledge and immediate recall  Fluent speech  Poor vocabulary  Cranial Nerves:   II: Pupils: equal, round, reactive to light  III,IV,VI: Extra Ocular Movements are intact. No nystagmus  V: Facial sensation is intact   VII: Facial strength and movements: intact and symmetric  XII: Tongue movements are normal  Musculoskeletal: 5/5 in all 4 extremities.   Tone: Normal tone.   Coordination: no tremors or drift  DTR: symmetric 2 in UL and LL  Sensation: normal   Gait/Posture: steady gait     The same    ROS : A 10-14 system review of constitutional, cardiovascular, respiratory, GI, eyes, , ENT, musculoskeletal, endocrine, hematological, skin, SHEENT, genitourinary, psychiatric and neurologic systems was obtained and updated today which is unremarkable except as mentioned in my HPI      Medical decision making:    A. Problems (any 1)    High:    [] Acute/Chronic Illness/injury posing threat to life or bodily function:    [] Severe exacerbation of chronic illness:      Moderate:    [x]     1 or more chronic illness with exacerbation, progression or side effect of treatment or  []     2 or more stable chronic illnesses or  []     1 acute illness with systemic symptoms

## 2025-06-24 NOTE — PATIENT INSTRUCTIONS

## 2025-06-25 ENCOUNTER — RESULTS FOLLOW-UP (OUTPATIENT)
Dept: NEUROLOGY | Age: 30
End: 2025-06-25

## 2025-06-25 LAB
ALBUMIN SERPL-MCNC: 4.5 G/DL (ref 3.4–5)
ALBUMIN/GLOB SERPL: 1.7 {RATIO} (ref 1.1–2.2)
ALP SERPL-CCNC: 76 U/L (ref 40–129)
ALT SERPL-CCNC: 25 U/L (ref 10–40)
ANION GAP SERPL CALCULATED.3IONS-SCNC: 11 MMOL/L (ref 3–16)
AST SERPL-CCNC: 28 U/L (ref 15–37)
BASOPHILS # BLD: 0 K/UL (ref 0–0.2)
BASOPHILS NFR BLD: 0.3 %
BILIRUB DIRECT SERPL-MCNC: <0.1 MG/DL (ref 0–0.3)
BILIRUB INDIRECT SERPL-MCNC: 0.2 MG/DL (ref 0–1)
BILIRUB SERPL-MCNC: 0.3 MG/DL (ref 0–1)
BUN SERPL-MCNC: 12 MG/DL (ref 7–20)
CALCIUM SERPL-MCNC: 9.5 MG/DL (ref 8.3–10.6)
CHLORIDE SERPL-SCNC: 106 MMOL/L (ref 99–110)
CO2 SERPL-SCNC: 25 MMOL/L (ref 21–32)
CREAT SERPL-MCNC: 1.1 MG/DL (ref 0.9–1.3)
DEPRECATED RDW RBC AUTO: 12.5 % (ref 12.4–15.4)
EOSINOPHIL # BLD: 0 K/UL (ref 0–0.6)
EOSINOPHIL NFR BLD: 0.4 %
GFR SERPLBLD CREATININE-BSD FMLA CKD-EPI: >90 ML/MIN/{1.73_M2}
GLUCOSE SERPL-MCNC: 121 MG/DL (ref 70–99)
HCT VFR BLD AUTO: 46.7 % (ref 40.5–52.5)
HGB BLD-MCNC: 15.8 G/DL (ref 13.5–17.5)
LYMPHOCYTES # BLD: 2.1 K/UL (ref 1–5.1)
LYMPHOCYTES NFR BLD: 42.5 %
MCH RBC QN AUTO: 31.1 PG (ref 26–34)
MCHC RBC AUTO-ENTMCNC: 33.9 G/DL (ref 31–36)
MCV RBC AUTO: 91.7 FL (ref 80–100)
MONOCYTES # BLD: 0.5 K/UL (ref 0–1.3)
MONOCYTES NFR BLD: 9.5 %
NEUTROPHILS # BLD: 2.3 K/UL (ref 1.7–7.7)
NEUTROPHILS NFR BLD: 47.3 %
PLATELET # BLD AUTO: 274 K/UL (ref 135–450)
PMV BLD AUTO: 9.4 FL (ref 5–10.5)
POTASSIUM SERPL-SCNC: 3.7 MMOL/L (ref 3.5–5.1)
PROT SERPL-MCNC: 7.1 G/DL (ref 6.4–8.2)
RBC # BLD AUTO: 5.09 M/UL (ref 4.2–5.9)
SODIUM SERPL-SCNC: 142 MMOL/L (ref 136–145)
WBC # BLD AUTO: 4.9 K/UL (ref 4–11)

## 2025-06-27 LAB — LAMOTRIGINE SERPL-MCNC: 6.3 UG/ML (ref 3–15)

## 2025-07-01 ENCOUNTER — OFFICE VISIT (OUTPATIENT)
Dept: PSYCHIATRY | Age: 30
End: 2025-07-01
Payer: COMMERCIAL

## 2025-07-01 DIAGNOSIS — R62.50 DEVELOPMENTAL DELAY: Primary | ICD-10-CM

## 2025-07-01 PROCEDURE — G8427 DOCREV CUR MEDS BY ELIG CLIN: HCPCS | Performed by: NURSE PRACTITIONER

## 2025-07-01 PROCEDURE — 1036F TOBACCO NON-USER: CPT | Performed by: NURSE PRACTITIONER

## 2025-07-01 PROCEDURE — G8417 CALC BMI ABV UP PARAM F/U: HCPCS | Performed by: NURSE PRACTITIONER

## 2025-07-01 PROCEDURE — 99213 OFFICE O/P EST LOW 20 MIN: CPT | Performed by: NURSE PRACTITIONER

## 2025-07-01 NOTE — PROGRESS NOTES
PSYCHIATRY PROGRESS NOTE    Nahid Hardwick  1995  07/02/25    CC:   Chief Complaint   Patient presents with    Follow-up     Referred by Kenya De Anda, APRN - CNP.      Diagnosis Orders   1. Developmental delay            Assessment & Plan:      Psychiatric Assessment  Patient presentation today indicative of Developmental Delay, Anger. Patient has been displaying symptoms of anger that have concerned those around him when it comes to work. However, patient declining medication today. Has no support here with him today.     2. Pharmacology     - None  - Taking Lamictal for seizures     3.   Psychotherapy  - Practice complementary health approaches such as: self-management strategies, relaxation techniques, yoga, and physical exercise as tolerated.     4.   Substance Abuse  - None     5.   Medical   - Neurology   - PCP     6.   RTC:  PRN  or earlier if your symptoms fail to improve or go to nearest ER if having active SI/HI.   Evaluated medications and assessed for side effects and effectiveness. Assessed patient's educational needs including reviewing plan of care, medications,diagnosis, treatment options, and prognosis. I reviewed the plan of care with the patient and the patient consented to the treatment interventions. Patient acknowledged, verbalized understanding, and agreed with plan of care.  ________________________________________________________________________________________________________________  HPI: Patient is a 29 y.o. male with h/o Developmental Delay who p/t office to establish care with this provider.     Context: Patient unsure why he is here today. Reports that he does have some anger issues. However, reports that he is not interested in medication to help with this currently nor therapy.      Associated Psych Symptoms: Reports some anger     Location: Mind  Duration: Years  Timing: Chronic  Severity: Mod  Modifying Factors: DD, Uncontrolled anger     Review of Systems -

## 2025-07-02 ASSESSMENT — PATIENT HEALTH QUESTIONNAIRE - PHQ9
SUM OF ALL RESPONSES TO PHQ QUESTIONS 1-9: 0
7. TROUBLE CONCENTRATING ON THINGS, SUCH AS READING THE NEWSPAPER OR WATCHING TELEVISION: NOT AT ALL
8. MOVING OR SPEAKING SO SLOWLY THAT OTHER PEOPLE COULD HAVE NOTICED. OR THE OPPOSITE, BEING SO FIGETY OR RESTLESS THAT YOU HAVE BEEN MOVING AROUND A LOT MORE THAN USUAL: NOT AT ALL
3. TROUBLE FALLING OR STAYING ASLEEP: NOT AT ALL
9. THOUGHTS THAT YOU WOULD BE BETTER OFF DEAD, OR OF HURTING YOURSELF: NOT AT ALL
1. LITTLE INTEREST OR PLEASURE IN DOING THINGS: NOT AT ALL
5. POOR APPETITE OR OVEREATING: NOT AT ALL
SUM OF ALL RESPONSES TO PHQ QUESTIONS 1-9: 0
SUM OF ALL RESPONSES TO PHQ QUESTIONS 1-9: 0
2. FEELING DOWN, DEPRESSED OR HOPELESS: NOT AT ALL
4. FEELING TIRED OR HAVING LITTLE ENERGY: NOT AT ALL
6. FEELING BAD ABOUT YOURSELF - OR THAT YOU ARE A FAILURE OR HAVE LET YOURSELF OR YOUR FAMILY DOWN: NOT AT ALL
10. IF YOU CHECKED OFF ANY PROBLEMS, HOW DIFFICULT HAVE THESE PROBLEMS MADE IT FOR YOU TO DO YOUR WORK, TAKE CARE OF THINGS AT HOME, OR GET ALONG WITH OTHER PEOPLE: NOT DIFFICULT AT ALL
SUM OF ALL RESPONSES TO PHQ QUESTIONS 1-9: 0

## 2025-08-29 ENCOUNTER — HOSPITAL ENCOUNTER (EMERGENCY)
Age: 30
Discharge: HOME | End: 2025-08-29
Attending: EMERGENCY MEDICINE
Payer: COMMERCIAL

## 2025-08-29 ENCOUNTER — APPOINTMENT (OUTPATIENT)
Dept: CT IMAGING | Age: 30
End: 2025-08-29
Payer: COMMERCIAL

## 2025-08-29 VITALS
SYSTOLIC BLOOD PRESSURE: 113 MMHG | TEMPERATURE: 98 F | BODY MASS INDEX: 30.45 KG/M2 | WEIGHT: 194 LBS | RESPIRATION RATE: 15 BRPM | HEIGHT: 67 IN | OXYGEN SATURATION: 97 % | DIASTOLIC BLOOD PRESSURE: 71 MMHG | HEART RATE: 62 BPM

## 2025-08-29 DIAGNOSIS — G40.919 BREAKTHROUGH SEIZURE (HCC): Primary | ICD-10-CM

## 2025-08-29 LAB
ALBUMIN SERPL-MCNC: 4.5 G/DL (ref 3.4–5)
ALBUMIN/GLOB SERPL: 1.3 {RATIO} (ref 1.1–2.2)
ALP SERPL-CCNC: 72 U/L (ref 40–129)
ALT SERPL-CCNC: 19 U/L (ref 10–40)
ANION GAP SERPL CALCULATED.3IONS-SCNC: 12 MMOL/L (ref 3–16)
AST SERPL-CCNC: 36 U/L (ref 15–37)
BASOPHILS # BLD: 0 K/UL (ref 0–0.2)
BASOPHILS NFR BLD: 0.3 %
BILIRUB SERPL-MCNC: 0.4 MG/DL (ref 0–1)
BUN SERPL-MCNC: 11 MG/DL (ref 7–20)
CALCIUM SERPL-MCNC: 9.8 MG/DL (ref 8.3–10.6)
CHLORIDE SERPL-SCNC: 104 MMOL/L (ref 99–110)
CO2 SERPL-SCNC: 24 MMOL/L (ref 21–32)
CREAT SERPL-MCNC: 1.2 MG/DL (ref 0.9–1.3)
DEPRECATED RDW RBC AUTO: 12.6 % (ref 12.4–15.4)
EOSINOPHIL # BLD: 0 K/UL (ref 0–0.6)
EOSINOPHIL NFR BLD: 0.1 %
GFR SERPLBLD CREATININE-BSD FMLA CKD-EPI: 84 ML/MIN/{1.73_M2}
GLUCOSE SERPL-MCNC: 126 MG/DL (ref 70–99)
HCT VFR BLD AUTO: 45.6 % (ref 40.5–52.5)
HGB BLD-MCNC: 15.5 G/DL (ref 13.5–17.5)
LYMPHOCYTES # BLD: 1.5 K/UL (ref 1–5.1)
LYMPHOCYTES NFR BLD: 21.4 %
MCH RBC QN AUTO: 30.7 PG (ref 26–34)
MCHC RBC AUTO-ENTMCNC: 34 G/DL (ref 31–36)
MCV RBC AUTO: 90.3 FL (ref 80–100)
MONOCYTES # BLD: 0.6 K/UL (ref 0–1.3)
MONOCYTES NFR BLD: 8.1 %
NEUTROPHILS # BLD: 4.9 K/UL (ref 1.7–7.7)
NEUTROPHILS NFR BLD: 70.1 %
PLATELET # BLD AUTO: 288 K/UL (ref 135–450)
PMV BLD AUTO: 7.4 FL (ref 5–10.5)
POTASSIUM SERPL-SCNC: 3.7 MMOL/L (ref 3.5–5.1)
PROT SERPL-MCNC: 8.1 G/DL (ref 6.4–8.2)
RBC # BLD AUTO: 5.05 M/UL (ref 4.2–5.9)
SODIUM SERPL-SCNC: 140 MMOL/L (ref 136–145)
WBC # BLD AUTO: 7 K/UL (ref 4–11)

## 2025-08-29 PROCEDURE — 99284 EMERGENCY DEPT VISIT MOD MDM: CPT

## 2025-08-29 PROCEDURE — 85025 COMPLETE CBC W/AUTO DIFF WBC: CPT

## 2025-08-29 PROCEDURE — 80053 COMPREHEN METABOLIC PANEL: CPT

## 2025-08-29 PROCEDURE — 6360000002 HC RX W HCPCS: Performed by: EMERGENCY MEDICINE

## 2025-08-29 PROCEDURE — 70450 CT HEAD/BRAIN W/O DYE: CPT

## 2025-08-29 PROCEDURE — 80175 DRUG SCREEN QUAN LAMOTRIGINE: CPT

## 2025-08-29 PROCEDURE — 96374 THER/PROPH/DIAG INJ IV PUSH: CPT

## 2025-08-29 PROCEDURE — 93005 ELECTROCARDIOGRAM TRACING: CPT | Performed by: EMERGENCY MEDICINE

## 2025-08-29 PROCEDURE — 80177 DRUG SCRN QUAN LEVETIRACETAM: CPT

## 2025-08-29 RX ORDER — LEVETIRACETAM 500 MG/5ML
750 INJECTION, SOLUTION, CONCENTRATE INTRAVENOUS ONCE
Status: COMPLETED | OUTPATIENT
Start: 2025-08-29 | End: 2025-08-29

## 2025-08-29 RX ADMIN — LEVETIRACETAM 750 MG: 100 INJECTION INTRAVENOUS at 21:38

## 2025-08-29 ASSESSMENT — LIFESTYLE VARIABLES
HOW OFTEN DO YOU HAVE A DRINK CONTAINING ALCOHOL: MONTHLY OR LESS
HOW MANY STANDARD DRINKS CONTAINING ALCOHOL DO YOU HAVE ON A TYPICAL DAY: 1 OR 2

## 2025-08-29 ASSESSMENT — PAIN SCALES - GENERAL: PAINLEVEL_OUTOF10: 0

## 2025-08-29 ASSESSMENT — PAIN - FUNCTIONAL ASSESSMENT: PAIN_FUNCTIONAL_ASSESSMENT: 0-10

## 2025-08-30 LAB
EKG ATRIAL RATE: 78 BPM
EKG DIAGNOSIS: NORMAL
EKG P AXIS: 58 DEGREES
EKG P-R INTERVAL: 182 MS
EKG Q-T INTERVAL: 362 MS
EKG QRS DURATION: 92 MS
EKG QTC CALCULATION (BAZETT): 412 MS
EKG R AXIS: 63 DEGREES
EKG T AXIS: 11 DEGREES
EKG VENTRICULAR RATE: 78 BPM
LEVETIRACETAM SERPL-MCNC: 2.3 UG/ML (ref 6–46)
MEDICATION DOSE-MCNC: ABNORMAL

## 2025-08-30 PROCEDURE — 93010 ELECTROCARDIOGRAM REPORT: CPT | Performed by: INTERNAL MEDICINE

## 2025-08-30 ASSESSMENT — ENCOUNTER SYMPTOMS
SHORTNESS OF BREATH: 0
COUGH: 0
NAUSEA: 0
ABDOMINAL PAIN: 0
DIARRHEA: 0
VOMITING: 0
RHINORRHEA: 0

## 2025-08-31 LAB — LAMOTRIGINE SERPL-MCNC: 14.9 UG/ML (ref 3–15)
